# Patient Record
Sex: FEMALE | Race: WHITE | Employment: OTHER | URBAN - METROPOLITAN AREA
[De-identification: names, ages, dates, MRNs, and addresses within clinical notes are randomized per-mention and may not be internally consistent; named-entity substitution may affect disease eponyms.]

---

## 2017-01-25 ENCOUNTER — OP HISTORICAL/CONVERTED ENCOUNTER (OUTPATIENT)
Dept: OTHER | Age: 77
End: 2017-01-25

## 2017-05-27 ENCOUNTER — OP HISTORICAL/CONVERTED ENCOUNTER (OUTPATIENT)
Dept: OTHER | Age: 77
End: 2017-05-27

## 2017-06-18 ENCOUNTER — IP HISTORICAL/CONVERTED ENCOUNTER (OUTPATIENT)
Dept: OTHER | Age: 77
End: 2017-06-18

## 2017-08-09 ENCOUNTER — IP HISTORICAL/CONVERTED ENCOUNTER (OUTPATIENT)
Dept: OTHER | Age: 77
End: 2017-08-09

## 2017-10-04 ENCOUNTER — OP HISTORICAL/CONVERTED ENCOUNTER (OUTPATIENT)
Dept: OTHER | Age: 77
End: 2017-10-04

## 2017-11-28 ENCOUNTER — OP HISTORICAL/CONVERTED ENCOUNTER (OUTPATIENT)
Dept: OTHER | Age: 77
End: 2017-11-28

## 2018-03-31 ENCOUNTER — OP HISTORICAL/CONVERTED ENCOUNTER (OUTPATIENT)
Dept: OTHER | Age: 78
End: 2018-03-31

## 2018-08-15 ENCOUNTER — OP HISTORICAL/CONVERTED ENCOUNTER (OUTPATIENT)
Dept: OTHER | Age: 78
End: 2018-08-15

## 2018-09-15 ENCOUNTER — ED HISTORICAL/CONVERTED ENCOUNTER (OUTPATIENT)
Dept: OTHER | Age: 78
End: 2018-09-15

## 2018-12-04 ENCOUNTER — OP HISTORICAL/CONVERTED ENCOUNTER (OUTPATIENT)
Dept: OTHER | Age: 78
End: 2018-12-04

## 2019-01-22 ENCOUNTER — OP HISTORICAL/CONVERTED ENCOUNTER (OUTPATIENT)
Dept: OTHER | Age: 79
End: 2019-01-22

## 2019-02-27 ENCOUNTER — OP HISTORICAL/CONVERTED ENCOUNTER (OUTPATIENT)
Dept: OTHER | Age: 79
End: 2019-02-27

## 2019-07-13 ENCOUNTER — OP HISTORICAL/CONVERTED ENCOUNTER (OUTPATIENT)
Dept: OTHER | Age: 79
End: 2019-07-13

## 2019-12-04 ENCOUNTER — OP HISTORICAL/CONVERTED ENCOUNTER (OUTPATIENT)
Dept: OTHER | Age: 79
End: 2019-12-04

## 2020-10-25 ENCOUNTER — HOSPITAL ENCOUNTER (OUTPATIENT)
Dept: PREADMISSION TESTING | Age: 80
Discharge: HOME | End: 2020-10-25
Payer: MEDICARE

## 2020-10-25 PROCEDURE — 87635 SARS-COV-2 COVID-19 AMP PRB: CPT

## 2020-10-26 LAB — SARS-COV-2, COV2: NORMAL

## 2020-10-28 LAB — SARS-COV-2, COV2NT: NOT DETECTED

## 2020-10-29 ENCOUNTER — HOSPITAL ENCOUNTER (OUTPATIENT)
Age: 80
Discharge: HOME | End: 2020-10-29
Attending: SURGERY | Admitting: SURGERY
Payer: MEDICARE

## 2020-10-29 ENCOUNTER — ANESTHESIA (OUTPATIENT)
Dept: SURGERY | Age: 80
End: 2020-10-29
Payer: MEDICARE

## 2020-10-29 ENCOUNTER — ANESTHESIA EVENT (OUTPATIENT)
Dept: SURGERY | Age: 80
End: 2020-10-29
Payer: MEDICARE

## 2020-10-29 VITALS
TEMPERATURE: 97.1 F | DIASTOLIC BLOOD PRESSURE: 53 MMHG | RESPIRATION RATE: 16 BRPM | SYSTOLIC BLOOD PRESSURE: 111 MMHG | HEART RATE: 64 BPM | OXYGEN SATURATION: 98 %

## 2020-10-29 PROCEDURE — 77030041703 HC SLV COMPR DVT ARJO -B: Performed by: SURGERY

## 2020-10-29 PROCEDURE — 74011250636 HC RX REV CODE- 250/636: Performed by: NURSE ANESTHETIST, CERTIFIED REGISTERED

## 2020-10-29 PROCEDURE — 77030031139 HC SUT VCRL2 J&J -A: Performed by: SURGERY

## 2020-10-29 PROCEDURE — 74011000250 HC RX REV CODE- 250: Performed by: NURSE ANESTHETIST, CERTIFIED REGISTERED

## 2020-10-29 PROCEDURE — 76210000021 HC REC RM PH II 0.5 TO 1 HR: Performed by: SURGERY

## 2020-10-29 PROCEDURE — 76010000138 HC OR TIME 0.5 TO 1 HR: Performed by: SURGERY

## 2020-10-29 PROCEDURE — 74011250636 HC RX REV CODE- 250/636: Performed by: SURGERY

## 2020-10-29 PROCEDURE — 2709999900 HC NON-CHARGEABLE SUPPLY: Performed by: SURGERY

## 2020-10-29 PROCEDURE — 77030010509 HC AIRWY LMA MSK TELE -A: Performed by: NURSE ANESTHETIST, CERTIFIED REGISTERED

## 2020-10-29 PROCEDURE — 88300 SURGICAL PATH GROSS: CPT

## 2020-10-29 PROCEDURE — 74011000250 HC RX REV CODE- 250: Performed by: SURGERY

## 2020-10-29 PROCEDURE — 77030002933 HC SUT MCRYL J&J -A: Performed by: SURGERY

## 2020-10-29 PROCEDURE — 76060000032 HC ANESTHESIA 0.5 TO 1 HR: Performed by: SURGERY

## 2020-10-29 PROCEDURE — 76210000006 HC OR PH I REC 0.5 TO 1 HR: Performed by: SURGERY

## 2020-10-29 RX ORDER — OXYCODONE AND ACETAMINOPHEN 5; 325 MG/1; MG/1
2 TABLET ORAL AS NEEDED
Status: DISCONTINUED | OUTPATIENT
Start: 2020-10-29 | End: 2020-10-29 | Stop reason: HOSPADM

## 2020-10-29 RX ORDER — DEXAMETHASONE SODIUM PHOSPHATE 4 MG/ML
INJECTION, SOLUTION INTRA-ARTICULAR; INTRALESIONAL; INTRAMUSCULAR; INTRAVENOUS; SOFT TISSUE AS NEEDED
Status: DISCONTINUED | OUTPATIENT
Start: 2020-10-29 | End: 2020-10-29 | Stop reason: HOSPADM

## 2020-10-29 RX ORDER — ROSUVASTATIN CALCIUM 10 MG/1
10 TABLET, COATED ORAL
COMMUNITY

## 2020-10-29 RX ORDER — FENTANYL CITRATE 50 UG/ML
50 INJECTION, SOLUTION INTRAMUSCULAR; INTRAVENOUS
Status: DISCONTINUED | OUTPATIENT
Start: 2020-10-29 | End: 2020-10-29 | Stop reason: HOSPADM

## 2020-10-29 RX ORDER — DIPHENHYDRAMINE HYDROCHLORIDE 50 MG/ML
12.5 INJECTION, SOLUTION INTRAMUSCULAR; INTRAVENOUS AS NEEDED
Status: DISCONTINUED | OUTPATIENT
Start: 2020-10-29 | End: 2020-10-29 | Stop reason: HOSPADM

## 2020-10-29 RX ORDER — LIDOCAINE HYDROCHLORIDE 20 MG/ML
INJECTION, SOLUTION EPIDURAL; INFILTRATION; INTRACAUDAL; PERINEURAL AS NEEDED
Status: DISCONTINUED | OUTPATIENT
Start: 2020-10-29 | End: 2020-10-29 | Stop reason: HOSPADM

## 2020-10-29 RX ORDER — BUPIVACAINE HYDROCHLORIDE 2.5 MG/ML
INJECTION, SOLUTION EPIDURAL; INFILTRATION; INTRACAUDAL AS NEEDED
Status: DISCONTINUED | OUTPATIENT
Start: 2020-10-29 | End: 2020-10-29 | Stop reason: HOSPADM

## 2020-10-29 RX ORDER — SODIUM CHLORIDE 0.9 % (FLUSH) 0.9 %
5-40 SYRINGE (ML) INJECTION AS NEEDED
Status: DISCONTINUED | OUTPATIENT
Start: 2020-10-29 | End: 2020-10-29 | Stop reason: HOSPADM

## 2020-10-29 RX ORDER — CEFAZOLIN SODIUM 2 G/100ML
2 INJECTION, SOLUTION INTRAVENOUS ONCE
Status: COMPLETED | OUTPATIENT
Start: 2020-10-29 | End: 2020-10-29

## 2020-10-29 RX ORDER — SODIUM CHLORIDE, SODIUM LACTATE, POTASSIUM CHLORIDE, CALCIUM CHLORIDE 600; 310; 30; 20 MG/100ML; MG/100ML; MG/100ML; MG/100ML
20 INJECTION, SOLUTION INTRAVENOUS CONTINUOUS
Status: DISCONTINUED | OUTPATIENT
Start: 2020-10-29 | End: 2020-10-29 | Stop reason: HOSPADM

## 2020-10-29 RX ORDER — ONDANSETRON 2 MG/ML
4 INJECTION INTRAMUSCULAR; INTRAVENOUS AS NEEDED
Status: DISCONTINUED | OUTPATIENT
Start: 2020-10-29 | End: 2020-10-29 | Stop reason: HOSPADM

## 2020-10-29 RX ORDER — PROPOFOL 10 MG/ML
INJECTION, EMULSION INTRAVENOUS AS NEEDED
Status: DISCONTINUED | OUTPATIENT
Start: 2020-10-29 | End: 2020-10-29 | Stop reason: HOSPADM

## 2020-10-29 RX ORDER — ONDANSETRON 2 MG/ML
INJECTION INTRAMUSCULAR; INTRAVENOUS AS NEEDED
Status: DISCONTINUED | OUTPATIENT
Start: 2020-10-29 | End: 2020-10-29 | Stop reason: HOSPADM

## 2020-10-29 RX ORDER — HYDROMORPHONE HYDROCHLORIDE 1 MG/ML
0.5 INJECTION, SOLUTION INTRAMUSCULAR; INTRAVENOUS; SUBCUTANEOUS
Status: DISCONTINUED | OUTPATIENT
Start: 2020-10-29 | End: 2020-10-29 | Stop reason: HOSPADM

## 2020-10-29 RX ADMIN — CEFAZOLIN SODIUM 2 G: 2 INJECTION, SOLUTION INTRAVENOUS at 08:23

## 2020-10-29 RX ADMIN — LIDOCAINE HYDROCHLORIDE 80 MG: 20 INJECTION, SOLUTION EPIDURAL; INFILTRATION; INTRACAUDAL; PERINEURAL at 08:17

## 2020-10-29 RX ADMIN — SODIUM CHLORIDE, POTASSIUM CHLORIDE, SODIUM LACTATE AND CALCIUM CHLORIDE 20 ML/HR: 600; 310; 30; 20 INJECTION, SOLUTION INTRAVENOUS at 07:39

## 2020-10-29 RX ADMIN — DEXAMETHASONE SODIUM PHOSPHATE 4 MG: 4 INJECTION, SOLUTION INTRA-ARTICULAR; INTRALESIONAL; INTRAMUSCULAR; INTRAVENOUS; SOFT TISSUE at 08:17

## 2020-10-29 RX ADMIN — ONDANSETRON 4 MG: 2 INJECTION INTRAMUSCULAR; INTRAVENOUS at 08:17

## 2020-10-29 RX ADMIN — PROPOFOL 150 MG: 10 INJECTION, EMULSION INTRAVENOUS at 08:17

## 2020-10-29 NOTE — BRIEF OP NOTE
Brief Postoperative Note    Patient: Fred Payton  YOB: 1940  MRN: [id number]    Date of Procedure: 10/29/2020     Pre-Op Diagnosis: Malignant neoplasm of bronchus and lung ([address] [C34.90]    Post-Op Diagnosis: Same as preoperative diagnosis.       Procedure(s):  Port A Cath Removal    Surgeon(s):  Mario Montero MD    Surgical Assistant: Surg Asst-1: Muriel Carrillo    Anesthesia: General     Estimated Blood Loss (mL): Minimal    Complications: None    Specimens:   ID Type Source Tests Collected by Time Destination   1 : Foreign Body Preservative Port insertion site  Mario Montero MD 10/29/2020 9906 Pathology        Implants: * No implants in log *    Drains: * No LDAs found *    Findings: as above    Electronically Signed by David Mims MD on 10/29/2020 at 9:21 AM

## 2020-10-29 NOTE — ANESTHESIA PREPROCEDURE EVALUATION
Relevant Problems   No relevant active problems       Anesthetic History               Review of Systems / Medical History  Patient summary reviewed, nursing notes reviewed and pertinent labs reviewed    Pulmonary    COPD        Asthma        Neuro/Psych   Within defined limits           Cardiovascular  Within defined limits          Dysrhythmias : SVT and sinus tachycardia  Hyperlipidemia         GI/Hepatic/Renal     GERD           Endo/Other        Arthritis and cancer (H/O Breast and Lung CA)     Other Findings   Comments: Allergies  Contrast Agent (Iodine)  Adhesive Tape-silicones  Ht: -  Weight: -  BMI: -    Procedure  Port A Cath Removal  In Pre-Op, [facility] MAIN OR 01  Anes:   No responsible anesthesiologist  Provider:   Apurva Armendariz MD      Medical History  COPD  Asthma  Hyperlipidemia  GERD (gastroesophageal reflux disease)  Lung cancer, lower lobe (HCC)  Irregular heart beat  Arthritis  Lung cancer ([address]  Dyslipidemia  Arrhythmia  Other ill-defined conditions(799.89)  Cancer ([address]  Cancer ([address]  Cancer ([address]  Unspecified adverse effect of anesthesia           Physical Exam    Airway  Mallampati: II  TM Distance: 4 - 6 cm  Neck ROM: normal range of motion   Mouth opening: Normal     Cardiovascular    Rhythm: regular  Rate: normal         Dental  No notable dental hx       Pulmonary  Breath sounds clear to auscultation               Abdominal  GI exam deferred       Other Findings            Anesthetic Plan    ASA: 3  Anesthesia type: general, total IV anesthesia and MAC          Induction: Intravenous  Anesthetic plan and risks discussed with: Patient

## 2020-10-29 NOTE — ANESTHESIA POSTPROCEDURE EVALUATION
Procedure(s):  Port A Cath Removal.    general    Anesthesia Post Evaluation      Multimodal analgesia: multimodal analgesia used between 6 hours prior to anesthesia start to PACU discharge  Patient location during evaluation: PACU  Patient participation: complete - patient participated  Level of consciousness: awake  Pain score: 0  Pain management: adequate  Airway patency: patent  Anesthetic complications: no  Cardiovascular status: acceptable  Respiratory status: acceptable  Hydration status: acceptable  Post anesthesia nausea and vomiting:  controlled  Final Post Anesthesia Temperature Assessment:  Normothermia (36.0-37.5 degrees C)      INITIAL Post-op Vital signs:   Vitals Value Taken Time   /70 10/29/2020  9:15 AM   Temp 36.2 °C (97.1 °F) 10/29/2020  9:05 AM   Pulse 84 10/29/2020  9:15 AM   Resp 18 10/29/2020  9:15 AM   SpO2 98 % 10/29/2020  9:15 AM

## 2020-10-29 NOTE — DISCHARGE INSTRUCTIONS
Patient Education   Patient Education        Infection After Surgery: Care Instructions  Your Care Instructions  After surgery, an infection is always possible. It doesn't mean that the surgery didn't go well. Because an infection can be serious, your doctor has taken steps to manage it. Your doctor checked the infection and cleaned it if necessary. He or she may have made an opening in the area so that the pus can drain out. You may have gauze in the cut so that the area will stay open and keep draining. You may need antibiotics. You will need to follow up with your doctor to make sure the infection has gone away. Follow-up care is a key part of your treatment and safety. Be sure to make and go to all appointments, and call your doctor if you are having problems. It's also a good idea to know your test results and keep a list of the medicines you take. How can you care for yourself at home? · Make sure your surgeon knows that you saw a doctor about the infection. · If your doctor prescribed antibiotics, take them as directed. Do not stop taking them just because you feel better. You need to take the full course of antibiotics. · Ask your doctor if you can take an over-the-counter pain medicine, such as acetaminophen (Tylenol), ibuprofen (Advil, Motrin), or naproxen (Aleve). Be safe with medicines. Read and follow all instructions on the label. · Do not take two or more pain medicines at the same time unless the doctor told you to. Many pain medicines have acetaminophen, which is Tylenol. Too much acetaminophen (Tylenol) can be harmful. · Prop up the area on a pillow anytime you sit or lie down during the next 3 days. Try to keep it above the level of your heart. This will help reduce swelling. · Keep the skin clean and dry. · If you have a bandage, keep it clean and dry. · You may have a dressing over the cut (incision). A dressing helps the incision heal and protects it.  Your doctor will tell you how to take care of this. You can expect drainage from the wound. · If your doctor told you how to care for your incision, follow your doctor's instructions. If you did not get instructions, follow this general advice:  ? Wash around the incision with clean water 2 times a day. Don't use hydrogen peroxide or alcohol, which can slow healing. When should you call for help? Call your doctor now or seek immediate medical care if:    · You have signs that your infection is getting worse, such as:  ? Increased pain, swelling, warmth, or redness in the area. ? Red streaks leading from the area. ? Pus draining from the wound. ? A new or higher fever. Watch closely for changes in your health, and be sure to contact your doctor if you have any problems. Where can you learn more? Go to http://www.gray.com/  Enter C340 in the search box to learn more about \"Infection After Surgery: Care Instructions. \"  Current as of: June 26, 2019               Content Version: 12.6  © 3299-5926 [employer]. Care instructions adapted under license by [employer] (which disclaims liability or warranty for this information). If you have questions about a medical condition or this instruction, always ask your healthcare professional. [address] any warranty or liability for your use of this information. Learning About Anesthesia  What is anesthesia? Anesthesia controls pain. And it keeps all your organs working normally during surgery or another kind of procedure. Anesthesia can relax you. It can also make you sleepy or forgetful. Or it may make you unconscious. It depends on what kind you get. Your anesthesia provider (anesthesiologist or nurse anesthetist) will make sure you are comfortable and safe during the procedure or surgery. There are different types of anesthesia. · Local anesthesia. This type numbs a small part of the body.  Doctors use it for simple procedures. ? You get a shot in the area the doctor will work on.  ? You will feel some pressure during the procedure. ? You may stay awake. Or you may get medicine to help you relax or sleep. · Regional anesthesia. This type blocks pain to a larger area of the body. It can also help relieve pain right after surgery. And it may reduce your need for other pain medicine after surgery. There are different types. They include:  ? Peripheral nerve block. This is a shot near a specific nerve or group of nerves. It blocks pain in the part of the body supplied by the nerve. This is often used for procedures on the hands, arms, feet, legs, or face. ? Epidural and spinal anesthesia. This is a shot near the spinal cord and the nerves around it. It blocks pain from an entire area of the body, such as the belly, hips, or legs. · General anesthesia. This type affects the brain and the whole body. You may get it through a small tube placed in a vein (IV). Or you may breathe it in. You are unconscious and will not feel pain. During the surgery, you will be comfortable. Later, you will not remember much about the surgery. What type will you have? The type of anesthesia you have depends on many things, such as:  · The type of surgery or procedure and the reason you are having it. · Test results, such as blood tests. · How worried you feel about the surgery. · Your health. Your doctor and nurses will ask you about any past surgeries. They will ask about any health problems you may have, such as diabetes, lung or heart disease, or a history of stroke. They will want to know if you take medicine, such as blood thinners. Your doctor may also ask if any family members have had any problems with anesthesia. You will talk with your anesthesia provider about your options. In many cases, you may be able to choose the type of anesthesia you have. What are the risks of anesthesia? Major side effects are not common. But all types of anesthesia have some risk. Your risk depends on your overall health. It also depends on the type of anesthesia you have and how you respond to it. Serious but rare risks include breathing problems, heart attack, stroke, and reaction to the medicine. Some health conditions increase the risk of problems. Your anesthesia provider will find out about any health problems you have that may affect your care. Your anesthesia provider will closely watch your vital signs during anesthesia and surgery. This includes checking your blood pressure and heart rate. This may help you avoid problems from anesthesia. What can you do to prepare? You will get a list of instructions to help you prepare. Your doctor will let you know what to expect when you get to the hospital, during the surgery, and after. You will get instructions about when to stop eating and drinking. If you take medicine, you will get instructions about what you can and can't take before surgery. You will be asked to sign a consent form that says you understand the risks of anesthesia. Before you do, your anesthesia provider will talk with you about the best type for you and the risks and benefits of that type. Many people are nervous before they have anesthesia and surgery. Ask your doctor about ways to relax before surgery. These may include relaxation exercises or medicine. What can you expect after having anesthesia? Right after the surgery, you will be in the recovery room. Nurses will make sure you are comfortable. As the anesthesia wears off, you may feel some pain and discomfort from your surgery. Tell someone if you have pain. Pain medicine works better if you take it before the pain gets bad. You may feel some of the effects of anesthesia for a while. It takes time for the effects of the medicine to completely wear off. · If you had local or regional anesthesia you may feel numb and have less feeling in part of your body.  It may also take a few hours for you to be able to move and control your muscles as usual.  · When you first wake up from general anesthesia, you may be confused. Or it may be hard to think clearly. This is normal.  · Don't do anything for 24 hours that requires attention to detail. This includes going to work, making important decisions, or signing any legal documents. Other common side effects of anesthesia include:  · Nausea and vomiting. This does not usually last long. It can be treated with medicine. · A slight drop in body temperature. You may feel cold and shiver when you first wake up. · A sore throat, if you had general anesthesia. · Muscle aches or weakness. · Feeling tired. For minor surgeries, you may go home the same day. For other surgeries you may stay in the hospital. Your doctor will check on your recovery from the anesthesia. He or she will answer any questions you may have. Follow-up care is a key part of your treatment and safety. Be sure to make and go to all appointments, and call your doctor if you are having problems. It's also a good idea to know your test results and keep a list of the medicines you take. Where can you learn more? Go to http://www.gray.com/  Enter V817 in the search box to learn more about \"Learning About Anesthesia. \"  Current as of: August 22, 2019               Content Version: 12.6  © 3130-6326 [employer]. Care instructions adapted under license by [employer] (which disclaims liability or warranty for this information). If you have questions about a medical condition or this instruction, always ask your healthcare professional. [place] [address] any warranty or liability for your use of this information.

## 2020-10-29 NOTE — PROGRESS NOTES
DC instructions reviewed with pt and her daughter. Both verb understanding. Pt escorted out via wheelchair for dc home with daughter.

## 2020-10-30 NOTE — OP NOTES
[facility]  OPERATIVE REPORT    Name:  Chu Thompson  MR#:  [id number]  :  1940  ACCOUNT #:  [de-identified]  DATE OF SERVICE:  10/29/2020    PREOPERATIVE DIAGNOSIS:  Right lung cancer, status post chemoradiation treatment. POSTOPERATIVE DIAGNOSIS:  Right lung cancer, status post chemoradiation treatment. PROCEDURE PERFORMED:  Removal of right Port-A-Cath. SURGEON:  Raegan Osborne MD    ASSISTANT:  Ms. Sandoval Hemanth:  LMA general anesthesia/local anesthesia. ANESTHESIOLOGIST:  Dr. Marisel Carter. NURSE ANESTHETIST:  Ms. Deidra Pepe. COMPLICATIONS:  None. SPECIMENS REMOVED:  none    IMPLANTS:  As above    ESTIMATED BLOOD LOSS:  Minimal.    INDICATION:  The patient is an 18-year-old female who has been diagnosed with right lung cancer, underwent chemoradiation treatment. She has completed the treatment. She has been disease free for the past few years, wanted the Port-A-Cath to be removed. Risks, benefits, and complications were discussed and consent obtained. DESCRIPTION OF PROCEDURE:  The patient was taken to the operating room. The patient was laid supine. The patient received prophylactic dose of antibiotic. The patient had TEDs and SCDs applied on both lower extremities. After LMA intubation, the right chest wall site was prepped and draped in the usual sterile fashion. Time-out was completed. Local anesthesia was infiltrated. A small transverse incision was placed right on top of the site of the port. The incision was deepened to subcutaneous tissue. The catheter was opened up. The port was delivered and the catheter was also removed along with the port. The tract was suture ligated with 3-0 Vicryl stitch. The subcutaneous tissue and the capsule were closed using 3-0 Vicryl simple interrupted stitches. The skin was closed with 4-0 Monocryl continuous subcuticular stitch. Dermabond was applied. Additional local anesthesia was infiltrated.     The patient tolerated the procedure very well. There were no complications. Estimated blood loss was minimal.  The patient was extubated and transferred to the recovery room in stable condition. All counts were correct. MD SCARLETT [id number]  D:  10/29/2020 9:29  T:  10/29/2020 20:37  JOB #:  [id number]  CC:   Lin Pereira MD

## 2020-12-10 ENCOUNTER — TRANSCRIBE ORDER (OUTPATIENT)
Dept: REGISTRATION | Age: 80
End: 2020-12-10

## 2020-12-10 ENCOUNTER — HOSPITAL ENCOUNTER (OUTPATIENT)
Dept: GENERAL RADIOLOGY | Age: 80
Discharge: HOME | End: 2020-12-10
Payer: MEDICARE

## 2020-12-10 DIAGNOSIS — M47.16 LUMBAR SPONDYLOSIS WITH MYELOPATHY: Primary | ICD-10-CM

## 2020-12-10 DIAGNOSIS — M47.16 LUMBAR SPONDYLOSIS WITH MYELOPATHY: ICD-10-CM

## 2020-12-10 DIAGNOSIS — M65.20 CALCIFIC TENDINITIS: ICD-10-CM

## 2020-12-10 PROCEDURE — 72070 X-RAY EXAM THORAC SPINE 2VWS: CPT

## 2021-03-05 ENCOUNTER — TRANSCRIBE ORDER (OUTPATIENT)
Dept: SCHEDULING | Age: 81
End: 2021-03-05

## 2021-03-05 DIAGNOSIS — C34.32 PRIMARY MALIGNANT NEOPLASM OF BRONCHUS OF LEFT LOWER LOBE (HCC): Primary | ICD-10-CM

## 2021-03-13 ENCOUNTER — HOSPITAL ENCOUNTER (OUTPATIENT)
Dept: PET IMAGING | Age: 81
Discharge: HOME | End: 2021-03-13
Attending: INTERNAL MEDICINE
Payer: MEDICARE

## 2021-03-13 DIAGNOSIS — C34.32 PRIMARY MALIGNANT NEOPLASM OF BRONCHUS OF LEFT LOWER LOBE (HCC): ICD-10-CM

## 2021-03-13 PROCEDURE — A9552 F18 FDG: HCPCS

## 2021-11-13 ENCOUNTER — APPOINTMENT (OUTPATIENT)
Dept: GENERAL RADIOLOGY | Age: 81
DRG: 871 | End: 2021-11-13
Attending: EMERGENCY MEDICINE
Payer: MEDICARE

## 2021-11-13 ENCOUNTER — HOSPITAL ENCOUNTER (INPATIENT)
Age: 81
LOS: 3 days | Discharge: HOME | DRG: 871 | End: 2021-11-16
Attending: EMERGENCY MEDICINE | Admitting: INTERNAL MEDICINE
Payer: MEDICARE

## 2021-11-13 DIAGNOSIS — R06.02 SOB (SHORTNESS OF BREATH): Primary | ICD-10-CM

## 2021-11-13 PROBLEM — J96.21 ACUTE AND CHRONIC RESPIRATORY FAILURE WITH HYPOXIA (HCC): Status: ACTIVE | Noted: 2021-11-13

## 2021-11-13 PROBLEM — A41.9 SEPSIS (HCC): Status: ACTIVE | Noted: 2021-11-13

## 2021-11-13 LAB
ALBUMIN SERPL-MCNC: 3.3 G/DL (ref 3.5–5)
ALBUMIN/GLOB SERPL: 0.8 {RATIO} (ref 1.1–2.2)
ALP SERPL-CCNC: 88 U/L (ref 45–117)
ALT SERPL-CCNC: 16 U/L (ref 12–78)
ANION GAP SERPL CALC-SCNC: 6 MMOL/L (ref 5–15)
APPEARANCE UR: CLEAR
APTT PPP: 32.3 SEC (ref 21.2–34.1)
AST SERPL W P-5'-P-CCNC: 10 U/L (ref 15–37)
ATRIAL RATE: 114 BPM
BACTERIA URNS QL MICRO: NEGATIVE /HPF
BASOPHILS # BLD: 0 K/UL (ref 0–0.1)
BASOPHILS NFR BLD: 0 % (ref 0–1)
BILIRUB SERPL-MCNC: 0.6 MG/DL (ref 0.2–1)
BILIRUB UR QL: NEGATIVE
BNP SERPL-MCNC: 1126 PG/ML
BUN SERPL-MCNC: 11 MG/DL (ref 6–20)
BUN/CREAT SERPL: 17 (ref 12–20)
CA-I BLD-MCNC: 9.2 MG/DL (ref 8.5–10.1)
CALCULATED P AXIS, ECG09: 80 DEGREES
CALCULATED R AXIS, ECG10: 71 DEGREES
CALCULATED T AXIS, ECG11: 64 DEGREES
CHLORIDE SERPL-SCNC: 101 MMOL/L (ref 97–108)
CO2 SERPL-SCNC: 31 MMOL/L (ref 21–32)
COLOR UR: ABNORMAL
COVID-19 RAPID TEST, COVR: NOT DETECTED
CREAT SERPL-MCNC: 0.64 MG/DL (ref 0.55–1.02)
DIAGNOSIS, 93000: NORMAL
DIFFERENTIAL METHOD BLD: ABNORMAL
EOSINOPHIL # BLD: 0 K/UL (ref 0–0.4)
EOSINOPHIL NFR BLD: 0 % (ref 0–7)
ERYTHROCYTE [DISTWIDTH] IN BLOOD BY AUTOMATED COUNT: 13.4 % (ref 11.5–14.5)
FLUAV AG NPH QL IA: NEGATIVE
FLUBV AG NOSE QL IA: NEGATIVE
GLOBULIN SER CALC-MCNC: 3.9 G/DL (ref 2–4)
GLUCOSE SERPL-MCNC: 124 MG/DL (ref 65–100)
GLUCOSE UR STRIP.AUTO-MCNC: NEGATIVE MG/DL
HCT VFR BLD AUTO: 38.9 % (ref 35–47)
HGB BLD-MCNC: 11.9 G/DL (ref 11.5–16)
HGB UR QL STRIP: ABNORMAL
HYALINE CASTS URNS QL MICRO: ABNORMAL /LPF (ref 0–5)
IMM GRANULOCYTES # BLD AUTO: 0.1 K/UL (ref 0–0.04)
IMM GRANULOCYTES NFR BLD AUTO: 1 % (ref 0–0.5)
INR PPP: 1 (ref 0.9–1.1)
KETONES UR QL STRIP.AUTO: 5 MG/DL
LACTATE SERPL-SCNC: 1.1 MMOL/L (ref 0.4–2)
LEUKOCYTE ESTERASE UR QL STRIP.AUTO: NEGATIVE
LYMPHOCYTES # BLD: 1.1 K/UL (ref 0.8–3.5)
LYMPHOCYTES NFR BLD: 6 % (ref 12–49)
MCH RBC QN AUTO: 29.4 PG (ref 26–34)
MCHC RBC AUTO-ENTMCNC: 30.6 G/DL (ref 30–36.5)
MCV RBC AUTO: 96 FL (ref 80–99)
MONOCYTES # BLD: 1.2 K/UL (ref 0–1)
MONOCYTES NFR BLD: 7 % (ref 5–13)
MUCOUS THREADS URNS QL MICRO: ABNORMAL /LPF
NEUTS SEG # BLD: 14.9 K/UL (ref 1.8–8)
NEUTS SEG NFR BLD: 86 % (ref 32–75)
NITRITE UR QL STRIP.AUTO: NEGATIVE
NRBC # BLD: 0 K/UL (ref 0–0.01)
NRBC BLD-RTO: 0 PER 100 WBC
P-R INTERVAL, ECG05: 150 MS
PH UR STRIP: 5 [PH] (ref 5–8)
PLATELET # BLD AUTO: 247 K/UL (ref 150–400)
PMV BLD AUTO: 9.5 FL (ref 8.9–12.9)
POTASSIUM SERPL-SCNC: 3.7 MMOL/L (ref 3.5–5.1)
PROCALCITONIN SERPL-MCNC: 0.65 NG/ML
PROT SERPL-MCNC: 7.2 G/DL (ref 6.4–8.2)
PROT UR STRIP-MCNC: NEGATIVE MG/DL
PROTHROMBIN TIME: 12.9 SEC (ref 11.9–14.7)
Q-T INTERVAL, ECG07: 300 MS
QRS DURATION, ECG06: 76 MS
QTC CALCULATION (BEZET), ECG08: 413 MS
RBC # BLD AUTO: 4.05 M/UL (ref 3.8–5.2)
RBC #/AREA URNS HPF: ABNORMAL /HPF (ref 0–5)
SODIUM SERPL-SCNC: 138 MMOL/L (ref 136–145)
SP GR UR REFRACTOMETRY: 1.01 (ref 1–1.03)
SPECIMEN SOURCE: NORMAL
THERAPEUTIC RANGE,PTTT: NORMAL SEC (ref 82–109)
TROPONIN-HIGH SENSITIVITY: 53 NG/L (ref 0–51)
TROPONIN-HIGH SENSITIVITY: 56 NG/L (ref 0–51)
UA: UC IF INDICATED,UAUC: ABNORMAL
UROBILINOGEN UR QL STRIP.AUTO: 0.1 EU/DL (ref 0.1–1)
VENTRICULAR RATE, ECG03: 114 BPM
WBC # BLD AUTO: 17.3 K/UL (ref 3.6–11)
WBC URNS QL MICRO: ABNORMAL /HPF (ref 0–4)

## 2021-11-13 PROCEDURE — 85610 PROTHROMBIN TIME: CPT

## 2021-11-13 PROCEDURE — 81001 URINALYSIS AUTO W/SCOPE: CPT

## 2021-11-13 PROCEDURE — 80053 COMPREHEN METABOLIC PANEL: CPT

## 2021-11-13 PROCEDURE — 83605 ASSAY OF LACTIC ACID: CPT

## 2021-11-13 PROCEDURE — 83880 ASSAY OF NATRIURETIC PEPTIDE: CPT

## 2021-11-13 PROCEDURE — 71045 X-RAY EXAM CHEST 1 VIEW: CPT

## 2021-11-13 PROCEDURE — 84145 PROCALCITONIN (PCT): CPT

## 2021-11-13 PROCEDURE — 74011250636 HC RX REV CODE- 250/636: Performed by: EMERGENCY MEDICINE

## 2021-11-13 PROCEDURE — 74011250636 HC RX REV CODE- 250/636: Performed by: NURSE PRACTITIONER

## 2021-11-13 PROCEDURE — 87804 INFLUENZA ASSAY W/OPTIC: CPT

## 2021-11-13 PROCEDURE — 85730 THROMBOPLASTIN TIME PARTIAL: CPT

## 2021-11-13 PROCEDURE — 99285 EMERGENCY DEPT VISIT HI MDM: CPT

## 2021-11-13 PROCEDURE — 74011250637 HC RX REV CODE- 250/637: Performed by: NURSE PRACTITIONER

## 2021-11-13 PROCEDURE — 74011250637 HC RX REV CODE- 250/637: Performed by: EMERGENCY MEDICINE

## 2021-11-13 PROCEDURE — 93005 ELECTROCARDIOGRAM TRACING: CPT

## 2021-11-13 PROCEDURE — 94640 AIRWAY INHALATION TREATMENT: CPT

## 2021-11-13 PROCEDURE — 85025 COMPLETE CBC W/AUTO DIFF WBC: CPT

## 2021-11-13 PROCEDURE — 36415 COLL VENOUS BLD VENIPUNCTURE: CPT

## 2021-11-13 PROCEDURE — 87635 SARS-COV-2 COVID-19 AMP PRB: CPT

## 2021-11-13 PROCEDURE — 84484 ASSAY OF TROPONIN QUANT: CPT

## 2021-11-13 PROCEDURE — 65270000029 HC RM PRIVATE

## 2021-11-13 PROCEDURE — 87040 BLOOD CULTURE FOR BACTERIA: CPT

## 2021-11-13 RX ORDER — PANTOPRAZOLE SODIUM 40 MG/1
40 TABLET, DELAYED RELEASE ORAL DAILY
Status: DISCONTINUED | OUTPATIENT
Start: 2021-11-14 | End: 2021-11-16 | Stop reason: HOSPADM

## 2021-11-13 RX ORDER — HYDROXYZINE PAMOATE 25 MG/1
25 CAPSULE ORAL
Status: DISCONTINUED | OUTPATIENT
Start: 2021-11-13 | End: 2021-11-16 | Stop reason: HOSPADM

## 2021-11-13 RX ORDER — METOPROLOL SUCCINATE 50 MG/1
50 TABLET, EXTENDED RELEASE ORAL DAILY
Status: DISCONTINUED | OUTPATIENT
Start: 2021-11-14 | End: 2021-11-16 | Stop reason: HOSPADM

## 2021-11-13 RX ORDER — ACETAMINOPHEN 500 MG
1000 TABLET ORAL
Status: COMPLETED | OUTPATIENT
Start: 2021-11-13 | End: 2021-11-13

## 2021-11-13 RX ORDER — DOXYCYCLINE 100 MG/1
100 CAPSULE ORAL EVERY 12 HOURS
Status: DISCONTINUED | OUTPATIENT
Start: 2021-11-13 | End: 2021-11-15

## 2021-11-13 RX ORDER — ALBUTEROL SULFATE 90 UG/1
2 AEROSOL, METERED RESPIRATORY (INHALATION)
Status: DISCONTINUED | OUTPATIENT
Start: 2021-11-13 | End: 2021-11-16 | Stop reason: HOSPADM

## 2021-11-13 RX ORDER — FOLIC ACID 1 MG/1
1 TABLET ORAL DAILY
Status: DISCONTINUED | OUTPATIENT
Start: 2021-11-14 | End: 2021-11-16 | Stop reason: HOSPADM

## 2021-11-13 RX ORDER — ACETAMINOPHEN 500 MG
500 TABLET ORAL
Status: DISCONTINUED | OUTPATIENT
Start: 2021-11-13 | End: 2021-11-16 | Stop reason: HOSPADM

## 2021-11-13 RX ORDER — ASPIRIN 325 MG
325 TABLET ORAL
Status: COMPLETED | OUTPATIENT
Start: 2021-11-13 | End: 2021-11-13

## 2021-11-13 RX ORDER — GUAIFENESIN DEXTROMETHORPHAN HYDROBROMIDE ORAL SOLUTION 10; 100 MG/5ML; MG/5ML
10 SOLUTION ORAL
Status: DISCONTINUED | OUTPATIENT
Start: 2021-11-13 | End: 2021-11-16 | Stop reason: HOSPADM

## 2021-11-13 RX ORDER — LANOLIN ALCOHOL/MO/W.PET/CERES
3 CREAM (GRAM) TOPICAL
Status: DISCONTINUED | OUTPATIENT
Start: 2021-11-13 | End: 2021-11-16 | Stop reason: HOSPADM

## 2021-11-13 RX ORDER — DIPHENHYDRAMINE HCL 25 MG
25 TABLET ORAL
Status: DISCONTINUED | OUTPATIENT
Start: 2021-11-13 | End: 2021-11-13

## 2021-11-13 RX ORDER — NYSTATIN 100000 [USP'U]/ML
500000 SUSPENSION ORAL 4 TIMES DAILY
Status: DISCONTINUED | OUTPATIENT
Start: 2021-11-13 | End: 2021-11-16 | Stop reason: HOSPADM

## 2021-11-13 RX ORDER — BUDESONIDE AND FORMOTEROL FUMARATE DIHYDRATE 160; 4.5 UG/1; UG/1
2 AEROSOL RESPIRATORY (INHALATION) 2 TIMES DAILY
Status: DISCONTINUED | OUTPATIENT
Start: 2021-11-13 | End: 2021-11-16 | Stop reason: HOSPADM

## 2021-11-13 RX ORDER — IPRATROPIUM BROMIDE AND ALBUTEROL SULFATE 2.5; .5 MG/3ML; MG/3ML
3 SOLUTION RESPIRATORY (INHALATION)
Status: DISCONTINUED | OUTPATIENT
Start: 2021-11-13 | End: 2021-11-14

## 2021-11-13 RX ORDER — ENOXAPARIN SODIUM 100 MG/ML
40 INJECTION SUBCUTANEOUS EVERY 24 HOURS
Status: DISCONTINUED | OUTPATIENT
Start: 2021-11-13 | End: 2021-11-16 | Stop reason: HOSPADM

## 2021-11-13 RX ORDER — ATORVASTATIN CALCIUM 20 MG/1
20 TABLET, FILM COATED ORAL
Status: DISCONTINUED | OUTPATIENT
Start: 2021-11-13 | End: 2021-11-16 | Stop reason: HOSPADM

## 2021-11-13 RX ORDER — GUAIFENESIN 600 MG/1
1200 TABLET, EXTENDED RELEASE ORAL EVERY 12 HOURS
Status: DISCONTINUED | OUTPATIENT
Start: 2021-11-13 | End: 2021-11-16 | Stop reason: HOSPADM

## 2021-11-13 RX ORDER — FAMOTIDINE 20 MG/1
20 TABLET, FILM COATED ORAL DAILY
Status: DISCONTINUED | OUTPATIENT
Start: 2021-11-14 | End: 2021-11-13

## 2021-11-13 RX ORDER — NYSTATIN 100000 [USP'U]/ML
500000 SUSPENSION ORAL 4 TIMES DAILY
Status: DISCONTINUED | OUTPATIENT
Start: 2021-11-13 | End: 2021-11-13

## 2021-11-13 RX ORDER — ONDANSETRON 2 MG/ML
4 INJECTION INTRAMUSCULAR; INTRAVENOUS
Status: DISCONTINUED | OUTPATIENT
Start: 2021-11-13 | End: 2021-11-16 | Stop reason: HOSPADM

## 2021-11-13 RX ADMIN — SODIUM CHLORIDE 1000 ML: 9 INJECTION, SOLUTION INTRAVENOUS at 10:05

## 2021-11-13 RX ADMIN — ASPIRIN 325 MG ORAL TABLET 325 MG: 325 PILL ORAL at 11:02

## 2021-11-13 RX ADMIN — ENOXAPARIN SODIUM 40 MG: 40 INJECTION SUBCUTANEOUS at 14:04

## 2021-11-13 RX ADMIN — ATORVASTATIN CALCIUM 20 MG: 20 TABLET, FILM COATED ORAL at 21:04

## 2021-11-13 RX ADMIN — ACETAMINOPHEN 1000 MG: 500 TABLET ORAL at 11:02

## 2021-11-13 RX ADMIN — ACETAMINOPHEN 500 MG: 500 TABLET ORAL at 23:32

## 2021-11-13 RX ADMIN — GUAIFENESIN DEXTROMETHORPHAN HYDROBROMIDE ORAL SOLUTION 10 ML: 10; 100 SOLUTION ORAL at 16:04

## 2021-11-13 RX ADMIN — DOXYCYCLINE HYCLATE 100 MG: 100 CAPSULE ORAL at 14:04

## 2021-11-13 RX ADMIN — ONDANSETRON 4 MG: 2 INJECTION INTRAMUSCULAR; INTRAVENOUS at 17:37

## 2021-11-13 RX ADMIN — DOXYCYCLINE HYCLATE 100 MG: 100 CAPSULE ORAL at 21:04

## 2021-11-13 RX ADMIN — BUDESONIDE AND FORMOTEROL FUMARATE DIHYDRATE 2 PUFF: 160; 4.5 AEROSOL RESPIRATORY (INHALATION) at 19:50

## 2021-11-13 RX ADMIN — GUAIFENESIN 1200 MG: 600 TABLET ORAL at 21:04

## 2021-11-13 NOTE — CONSULTS
Consult    Patient: Sonia Juárez MRN: [id number]  SSN: [id number]    YOB: 1940  Age: 78 y.o. Sex: female      Subjective:      Sonia Juárez is a 78 y.o. female who is being seen for abnormal troponin. Patient with history of tachycardia on metoprolol, right upper lung cancer, asthma/COPD on chronic home oxygen, hyperlipidemia, GERD. She quit smoking 20 years ago. She was recently seen by primary care physician when she was doing just fine then starting Thursday she started having worsening shortness of breath and wheezing and some back pain and chest tightness. Denied any nausea, vomiting or fever or chills. The cough is dry. She denied recent change in her weight or lower extremity swelling. She has varicose veins. She denied recent falls.     Past Medical History:   Diagnosis Date    Arrhythmia     \"fast heart rate\" at times, takes Toprol to control    Arthritis     Asthma     Cancer ([address] 1996    RUL lung    Cancer ([address] 2001    left breast    Cancer ([address] 2013, 2014    right lung    COPD     Dyslipidemia 4/29/2014    GERD (gastroesophageal reflux disease)     Hyperlipidemia     Irregular heart beat 1st-1983,early 2000's    SVT per patient-none since starting metoprolol early 2000's    Lung cancer ([address] 4/29/2014    Lung cancer, lower lobe ([address] 3/2013    T3Nx adenocarcinoma RLL    Other ill-defined conditions(799.89)     urinary urgency    Unspecified adverse effect of anesthesia     severe itching after lung surgery     Past Surgical History:   Procedure Laterality Date    HX APPENDECTOMY      HX BLADDER SUSPENSION  around 2004    HX BREAST LUMPECTOMY  2001    left w/nodes removed    HX CATARACT REMOVAL Bilateral 2013    HX HYSTERECTOMY  1985    ANTON    HX ORTHOPAEDIC Right 1980's    bunion removal    HX OTHER SURGICAL  3/5/2013    RLL superior segmentectomy    HX OTHER SURGICAL  3/11/2013    Resection of RLL margins    HX UROLOGICAL      Bladder tuck [facility], 2014    right lung (see \"other\")    NV THORACOSCOPY SURG LOBECTOMY      RUL lung cancer      Family History   Problem Relation Age of Onset    Cancer Mother         colon    Cancer Father         prostate    Cancer Sister         breast    Cancer Other         breast    Ovarian Cancer Other     No Known Problems Brother     No Known Problems Maternal Grandmother     No Known Problems Maternal Grandfather     No Known Problems Paternal Grandmother     No Known Problems Paternal Grandfather      Social History     Tobacco Use    Smoking status: Former Smoker     Packs/day: 1.00     Years: 60.00     Pack years: 60.00     Types: Cigarettes     Quit date: 3/4/2013     Years since quittin.7    Smokeless tobacco: Never Used   Substance Use Topics    Alcohol use: No      Current Facility-Administered Medications   Medication Dose Route Frequency Provider Last Rate Last Admin    acetaminophen (TYLENOL) tablet 500 mg  500 mg Oral Q6H PRN Chay Gonzalez NP        albuterol (PROVENTIL HFA, VENTOLIN HFA, PROAIR HFA) inhaler 2 Puff  2 Puff Inhalation Q6H PRN Chay Gonzalez NP        [START ON 2021] pantoprazole (PROTONIX) tablet 40 mg  40 mg Oral DAILY Chay Gonzalez NP        budesonide-formoteroL (SYMBICORT) 160-4.5 mcg/actuation HFA inhaler 2 Puff  2 Puff Inhalation BID Chay Gonzalez NP        [START ON ] folic acid (FOLVITE) tablet 1 mg  1 mg Oral DAILY Chay Gonzalez NP        [START ON 2021] metoprolol succinate (TOPROL-XL) XL tablet 50 mg  50 mg Oral DAILY Chay Gonzalez NP        atorvastatin (LIPITOR) tablet 20 mg  20 mg Oral QHS Chay Gonzalez NP        [facility]dansetron [facility]) injection 4 mg  4 mg IntraVENous Q6H PRN Chay Gonzalez NP        hydrOXYzine pamoate (VISTARIL) capsule 25 mg  25 mg Oral TID PRN Chay Gonzalez NP        melatonin tablet 3 mg  3 mg Oral QHS PRN Suman Gonzalez, ABEL Arguello enoxaparin (LOVENOX) injection 40 mg  40 mg SubCUTAneous Q24H Zakeshav Jan A, NP   40 mg at 11/13/21 1404    albuterol-ipratropium (DUO-NEB) 2.5 MG-0.5 MG/3 ML  3 mL Nebulization Q4H PRN Lisa Chay MEGAN, NP        doxycycline (VIBRAMYCIN) capsule 100 mg  100 mg Oral Q12H Zarefoss Jan A, NP   100 mg at 11/13/21 1404    guaiFENesin ER (MUCINEX) tablet 1,200 mg  1,200 mg Oral Q12H [facility] A, NP        guaiFENesin-dextromethorphan (TUSSI-ORGANIDIN DM)  mg/5 mL oral solution 10 mL  10 mL Oral Q4H PRN [facility] A, NP   10 mL at 11/13/21 1604    prochlorperazine (COMPAZINE) with saline injection 10 mg  10 mg IntraVENous Q6H PRN Sigrid Hilario MD        nystatin (MYCOSTATIN) 100,000 unit/mL oral suspension 500,000 Units  500,000 Units Oral QID Zakeshav Jan A, NP            Allergies   Allergen Reactions    Contrast Agent [Iodine] Rash    Adhesive Tape-Silicones Itching and Contact Dermatitis       Review of Systems:  A comprehensive review of systems was negative except for that written in the History of Present Illness. Objective:     Vitals:    11/13/21 1102 11/13/21 1200 11/13/21 1304 11/13/21 1553   BP: (!) 138/95 (!) 114/58 (!) 108/55    Pulse: (!) 111 (!) 106 94 98   Resp: 20 20 20    Temp:  99.4 °F (37.4 °C) 99.1 °F (37.3 °C)    SpO2: 100% 98% 96%    Weight:       Height:            Physical Exam:  General:  Alert, cooperative, no distress, appears stated age. Eyes:  Conjunctivae/corneas clear. PERRL, EOMs intact. Fundi benign   Ears:  Normal TMs and external ear canals both ears. Nose: Nares normal. Septum midline. Mucosa normal. No drainage or sinus tenderness. Mouth/Throat: Lips, mucosa, and tongue normal. Teeth and gums normal.   Neck: Supple, symmetrical, trachea midline, no adenopathy, thyroid: no enlargment/tenderness/nodules, no carotid bruit and no JVD. Back:   Symmetric, no curvature. ROM normal. No CVA tenderness. Lungs:    End-expiratory wheeze bilaterally, diffuse. Heart:  Regular rate and rhythm, S1, S2 normal, no murmur, click, rub or gallop. Abdomen:   Soft, non-tender. Bowel sounds normal. No masses,  No organomegaly. Extremities: Extremities normal, atraumatic, no cyanosis or edema. Pulses: 2+ and symmetric all extremities. Skin: Skin color, texture, turgor normal. No rashes or lesions   Lymph nodes: Cervical, supraclavicular, and axillary nodes normal.   Neurologic: CNII-XII intact. Normal strength, sensation and reflexes throughout. Assessment:     Hospital Problems  Date Reviewed: 11/13/2021          Codes Class Noted POA    Sepsis ([facility] [address] ICD-10-CM: A41.9  ICD-9-CM: 038.9, 995.91  11/13/2021 Unknown        Acute and chronic respiratory failure with hypoxia [facility]) ICD-10-CM: B84.61  ICD-9-CM: 518.84, 799.02  11/13/2021 Unknown          Patient is an 22-year-old white female with:  1. Acute on chronic hypoxemic respiratory failure  2. Hypertension  3. SVT  4. Mild rise of troponin  5. GERD  6. Ex-smoker  7. History of lung adenocarcinoma status post right thoracotomy. Plan:     She denies any current chest pains to be euvolemic. White count is elevated at 17.3. Hemoglobin 11.9 and platelet 350. Potassium 3.7, at 0.6 and BUN of 11. Troponin were mildly elevated. She denied any current chest pain. Procalcitonin is 0.65. BNP is elevated but she appears to be euvolemic. Covid test was negative and influenza was negative. She received her Covid vaccination. EKG shows sinus tachycardia, septal infarct age undetermined. We will check another set of troponin. Check an echocardiogram.  Currently on atorvastatin and metoprolol. Further recommendation depending on progression, echocardiogram    Thank you  For involving me in Mrs. Bustos's care.     Signed By: Azalea Evangelitsa MD     November 13, 2021

## 2021-11-13 NOTE — PROGRESS NOTES
Advance Care Planning     Advance Care Planning (ACP) Physician/NP/PA Conversation      Date of Conversation: 11/13/2021  Conducted with: Patient with Decision Making Capacity    Healthcare Decision Maker:   No healthcare decision makers have been documented. Click here to complete [employer] including selection of the Healthcare Decision Maker Relationship (ie \"Primary\")    Today we documented Decision Maker(s) consistent with Legal Next of Kin hierarchy. Care Preferences:    Hospitalization: \"If your health worsens and it becomes clear that your chance of recovery is unlikely, what would be your preference regarding hospitalization? \"  The patient would prefer hospitalization. Ventilation: \"If you were unable to breathe on your own and your chance of recovery was unlikely, what would be your preference about the use of a ventilator (breathing machine) if it was available to you? \"   The patient would desire the use of a ventilator. Resuscitation: \"In the event your heart stopped as a result of an underlying serious health condition, would you want attempts to be made to restart your heart, or would you prefer a natural death? \"   Yes, attempt to resuscitate.     Additional topics discussed: treatment goals    Conversation Outcomes / Follow-Up Plan:   ACP complete - no further action today  Reviewed DNR/DNI and patient elects Full Code (Attempt Resuscitation)     Length of Voluntary ACP Conversation in minutes:  <16 minutes (Non-Billable)    Carlos Paulson NP

## 2021-11-13 NOTE — ED PROVIDER NOTES
EMERGENCY DEPARTMENT HISTORY AND PHYSICAL EXAM      Date: 11/13/2021  Patient Name: Dayo Martinez      History of Presenting Illness     Chief Complaint   Patient presents with    Chest Pain       History Provided By: Patient    HPI: Dayo Martinez, 78 y.o. female with a past medical history significant for COPD on home 2LNC, HLD, Remote Lung CA s/p resection presents to the ED with cc of CP, SOB, vomiting. Pt endorses 4-5d of having cold-like sx, general malaise, rhinorrhea. Assoc. W/substernal CP and SOB. Further endorsing N/V that began yesterday. Denies known sick contacts. Received COVID vaccine 2mo ago. Has not yet received flu vaccine. Denies known sick contacts. There are no other complaints, changes, or physical findings at this time. PCP: Graham Wilkins MD    Current Facility-Administered Medications   Medication Dose Route Frequency Provider Last Rate Last Admin    sodium chloride 0.9 % bolus infusion 1,000 mL  1,000 mL IntraVENous ONCE Sherrie Norris MD 1,000 mL/hr at 11/13/21 1005 1,000 mL at 11/13/21 1005     Current Outpatient Medications   Medication Sig Dispense Refill    rosuvastatin (Crestor) 10 mg tablet Take 10 mg by mouth nightly. iron polysaccharides (FERREX 150) 150 mg iron capsule Take 150 mg by mouth Daily (before breakfast). folic acid (FOLVITE) 1 mg tablet Take  by mouth Daily (before breakfast). oxyCODONE-acetaminophen (PERCOCET 10)  mg per tablet Take 1 tablet by mouth.      metoprolol succinate (TOPROL-XL) 50 mg XL tablet Take 1 Tab by mouth daily. 60 Tab 1    diphenhydrAMINE (BENADRYL) 25 mg tablet Take 25 mg by mouth every six (6) hours as needed (taken during allergy season). acetaminophen (TYLENOL) 500 mg tablet Take 500 mg by mouth every six (6) hours as needed. fluticasone-salmeterol (ADVAIR DISKUS) 250-50 mcg/dose diskus inhaler Take 1 Puff by inhalation every twelve (12) hours.       albuterol (VENTOLIN HFA) 90 mcg/Actuation inhaler Take 2 Puffs by inhalation every six (6) hours as needed. simvastatin (ZOCOR) 20 mg tablet Take 20 mg by mouth nightly. esomeprazole (NEXIUM) 40 mg capsule Take 40 mg by mouth Daily (before breakfast).          Past History     Past Medical History:  Past Medical History:   Diagnosis Date    Arrhythmia     \"fast heart rate\" at times, takes Toprol to control    Arthritis     Asthma     Cancer ([facility] [address]     RUL lung    Cancer ([facility] [address]     left breast    Cancer ([facility] [address] 2014    right lung    COPD     Dyslipidemia 2014    GERD (gastroesophageal reflux disease)     Hyperlipidemia     Irregular heart beat -,early     SVT per patient-none since starting metoprolol early     Lung cancer ([facility] [address] 2014    Lung cancer, lower lobe ([facility] [address] 3/2013    T3Nx adenocarcinoma RLL    Other ill-defined conditions(799.89)     urinary urgency    Unspecified adverse effect of anesthesia     severe itching after lung surgery       Past Surgical History:  Past Surgical History:   Procedure Laterality Date    [address], 2014    right lung (see \"other\")    HX APPENDECTOMY      HX BLADDER SUSPENSION  around     HX BREAST LUMPECTOMY      left w/nodes removed    HX CATARACT REMOVAL Bilateral     HX HYSTERECTOMY  1985    [facility]    HX ORTHOPAEDIC Right     bunion removal    HX OTHER SURGICAL  3/5/2013    RLL superior segmentectomy    HX OTHER SURGICAL  3/11/2013    Resection of RLL margins    HX UROLOGICAL      Bladder tuck    [place] THORACOSCOPY SURG LOBECTOMY  1996    RUL lung cancer       Family History:  Family History   Problem Relation Age of Onset    Cancer Mother         colon    Cancer Father         prostate    Cancer Sister         breast    Cancer Other         breast       Social History:  Social History     Tobacco Use    Smoking status: Former Smoker     Years: 60.00     Quit date: 3/4/2013     Years since quittin.7    Smokeless tobacco: Never Used Substance Use Topics    Alcohol use: No    Drug use: No       Allergies: Allergies   Allergen Reactions    Contrast Agent [Iodine] Rash    Adhesive Tape-Silicones Itching and Contact Dermatitis         Review of Systems   Constitutional: Negative except as in HPI. Eyes: Negative except as in HPI.  ENT: Negative except as in HPI. Cardiovascular: Negative except as in HPI. Respiratory: Negative except as in HPI. Gastrointestinal: Negative except as in HPI. Genitourinary: Negative except as in HPI. Musculoskeletal: Negative except as in HPI. Integumentary: Negative except as in HPI. Neurological: Negative except as in HPI. Psychiatric: Negative except as in HPI. Endocrine: Negative except as in HPI. Hematologic/Lymphatic: Negative except as in HPI. Allergic/Immunologic: Negative except as in HPI. Physical Exam   Constitutional: Awake and alert, interactive, NAD  Eyes: PERRL, no injection or scleral icterus, no discharge  HEENT: NCAT, neck supple, MMM, no oropharyngeal exudates  CV: RRR, no m/r/g  Respiratory: CTAB, no r/r/w on home 2LNC  GI: Abd soft, nondistended, nontender  : Deferred  MSK: FROM, no joint effusions or edema  Skin: No rashes  Neuro: CN2-12 intact, symmetric facies, fluent speech. Psych: Well-groomed, normal speech, behavior, appropriate mood    Lab and Diagnostic Study Results     Labs -   No results found for this or any previous visit (from the past 12 hour(s)). Radiologic Studies -   [unfilled]  CT Results  (Last 48 hours)      None          CXR Results  (Last 48 hours)                 11/13/21 1014  XR CHEST SNGL V Final result    Impression:  Postsurgical findings as above. Narrative:  Chest one view. No comparison. Portable AP upright view at 1008 dated 11/13/2021. There has been a remote right thoracotomy. There is right hemithorax volume loss   and right apical pleural thickening/loculated pleural fluid. The heart is not enlarged.  There is calcified plaque in the aorta. The left lung   is clear. There is no pneumothorax. There is a 6 mm linear radiodense structure overlying the left chest wall soft   tissues of uncertain significance. Medical Decision Making and ED Course   - I am the first and primary provider for this patient AND AM THE PRIMARY PROVIDER OF RECORD. - I reviewed the vital signs, available nursing notes, past medical history, past surgical history, family history and social history. - Initial assessment performed. The patients presenting problems have been discussed, and the staff are in agreement with the care plan formulated and outlined with them. I have encouraged them to ask questions as they arise throughout their visit. Vital Signs-Reviewed the patient's vital signs. Patient Vitals for the past 12 hrs:   Temp Pulse Resp BP SpO2   11/13/21 1004 -- -- -- -- 100 %   11/13/21 1002 -- (!) 115 20 135/63 100 %   11/13/21 0959 (!) 101.1 °F (38.4 °C) (!) 117 20 -- 98 %       EKG interpretation: [email or web address], nl QRS/QTc/axis, no DEE/STD, TWI inferior lead aVF    BSUS: B/l scattered B-lines, no pericardial effusion, confluent B-lines or PTX. Provider Notes (Medical Decision Making): 81F w/CP, SOB. EKG nonischemic will r/o ACS. Suspect Viral PNA, possibly superimposed bacterial, will get CXR to eval further. No hx of CHF to suggest fluid overload. Back on home [facility], Dispo pending workup. ED Course:       ED Course as of 11/13/21 1537   Sat Nov 13, 2021   1120 Admitted to Dr. Carla Cornelius for observation for Viral PNA. [YA]      ED Course User Index  [YA] Сергей Box MD         Consultations:     Hospitalist Dr. Carla Cornelius      Disposition     Disposition: Admitted to Floor Medical Floor the case was discussed with the admitting physician Dr. Carla Cornelius    Admitted    Diagnosis     Clinical Impression:   1. SOB (shortness of breath)        Attestations:     Narciso Kelly MD

## 2021-11-13 NOTE — ROUTINE PROCESS
TRANSFER - OUT REPORT:    Verbal report given to [facility] on Gely Otero  being transferred to [facility] nurse for routine progression of care       Report consisted of patients Situation, Background, Assessment and   Recommendations(SBAR). Information from the following report(s) SBAR was reviewed with the receiving nurse. Lines:   Peripheral IV 11/13/21 Right Antecubital (Active)       Peripheral IV 11/13/21 Anterior; Left Wrist (Active)        Opportunity for questions and clarification was provided.       Patient transported with:   [employer]

## 2021-11-13 NOTE — H&P
History and Physical    Patient: Tamela Christie MRN: [id number]  SSN: [id number]    YOB: 1940  Age: 78 y.o. Sex: female      Subjective:      Tamela Christie is a 78 y.o. female who comes to the hospital with one day complaints of dyspnea, associated cough and reported had nausea/vomiting and diarrhea yesterday at home. She has history of right lung lower lobe adenocarcinoma,s/p right thoracotomy, and is on continuous oxygen at home, however presented with hypoxia requiring NC O2 at 4 L . Presenting CXR showing left lung clear , no pneumothorax, and right apical pleural thickening/loculated pleural fluid. PMH includes hypertension, COPD, CAD, asthma, GERD, and arthritis. She will be admitted for further evaluation and treatment.      Past Medical History:   Diagnosis Date    Arrhythmia     \"fast heart rate\" at times, takes Toprol to control    Arthritis     Asthma     Cancer ([address] 1996    RUL lung    Cancer ([address] 2001    left breast    Cancer ([address] 2013, 2014    right lung    COPD     Dyslipidemia 4/29/2014    GERD (gastroesophageal reflux disease)     Hyperlipidemia     Irregular heart beat 1st-1983,early 2000's    SVT per patient-none since starting metoprolol early 2000's    Lung cancer ([address] 4/29/2014    Lung cancer, lower lobe ([address] 3/2013    T3Nx adenocarcinoma RLL    Other ill-defined conditions(799.89)     urinary urgency    Unspecified adverse effect of anesthesia     severe itching after lung surgery     Past Surgical History:   Procedure Laterality Date    HX APPENDECTOMY      HX BLADDER SUSPENSION  around 2004    HX BREAST LUMPECTOMY  2001    left w/nodes removed    HX CATARACT REMOVAL Bilateral 2013    HX HYSTERECTOMY  1985    ANTON    HX ORTHOPAEDIC Right 1980's    bunion removal    HX OTHER SURGICAL  3/5/2013    RLL superior segmentectomy    HX OTHER SURGICAL  3/11/2013    Resection of RLL margins    HX UROLOGICAL      Bladder tuck    SC CHEST [address] [place] [address], ,     right lung (see \"other\")    TX THORACOSCOPY SURG LOBECTOMY  1996    RUL lung cancer      Family History   Problem Relation Age of Onset    Cancer Mother         colon    Cancer Father         prostate    Cancer Sister         breast    Cancer Other         breast    Ovarian Cancer Other     No Known Problems Brother     No Known Problems Maternal Grandmother     No Known Problems Maternal Grandfather     No Known Problems Paternal Grandmother     No Known Problems Paternal Grandfather      Social History     Tobacco Use    Smoking status: Former Smoker     Packs/day: 1.00     Years: 60.00     Pack years: 60.00     Types: Cigarettes     Quit date: 3/4/2013     Years since quittin.7    Smokeless tobacco: Never Used   Substance Use Topics    Alcohol use: No      Prior to Admission medications    Medication Sig Start Date End Date Taking? Authorizing Provider   rosuvastatin (Crestor) 10 mg tablet Take 10 mg by mouth nightly. Yes Provider, Historical   iron polysaccharides (FERREX 150) 150 mg iron capsule Take 150 mg by mouth Daily (before breakfast). Yes Provider, Historical   folic acid (FOLVITE) 1 mg tablet Take  by mouth Daily (before breakfast). Yes Provider, Historical   metoprolol succinate (TOPROL-XL) 50 mg XL tablet Take 1 Tab by mouth daily. 14  Yes Rosie , MD   fluticasone-salmeterol (ADVAIR DISKUS) 250-50 mcg/dose diskus inhaler Take 1 Puff by inhalation every twelve (12) hours. Yes Provider, Historical   albuterol (VENTOLIN HFA) 90 mcg/Actuation inhaler Take 2 Puffs by inhalation every six (6) hours as needed. Yes Provider, Historical   esomeprazole (NEXIUM) 40 mg capsule Take 40 mg by mouth Daily (before breakfast). Yes Provider, Historical   oxyCODONE-acetaminophen (PERCOCET 10)  mg per tablet Take 1 tablet by mouth.     Provider, Historical   diphenhydrAMINE (BENADRYL) 25 mg tablet Take 25 mg by mouth every six (6) hours as needed (taken during allergy season). Provider, Historical   acetaminophen (TYLENOL) 500 mg tablet Take 500 mg by mouth every six (6) hours as needed. Provider, Historical   simvastatin (ZOCOR) 20 mg tablet Take 20 mg by mouth nightly. Provider, Historical        Allergies   Allergen Reactions    Contrast Agent [Iodine] Rash    Adhesive Tape-Silicones Itching and Contact Dermatitis       Review of Systems:  Review of Systems   Constitutional: Negative for fever. HENT: Positive for sore throat. Respiratory: Positive for cough and shortness of breath. Cardiovascular: Negative for chest pain and palpitations. Gastrointestinal: Negative for abdominal pain, diarrhea, heartburn, nausea and vomiting. Genitourinary: Negative for dysuria and urgency. Neurological: Negative for dizziness and headaches. Objective:     Vitals:    11/13/21 1002 11/13/21 1004 11/13/21 1102 11/13/21 1200   BP: 135/63  (!) 138/95 (!) 114/58   Pulse: (!) 115  (!) 111 (!) 106   Resp: 20  20 20   Temp:    99.4 °F (37.4 °C)   SpO2: 100% 100% 100% 98%   Weight:       Height:            Physical Exam:  Physical Exam  Constitutional:       General: She is not in acute distress. HENT:      Mouth/Throat:      Comments: Oral thrush on tongue  Cardiovascular:      Rate and Rhythm: Normal rate and regular rhythm. Heart sounds: Murmur heard. Pulmonary:      Comments: Scattered coarse sounds, diminished in right base  Abdominal:      General: Bowel sounds are normal.      Palpations: Abdomen is soft. Musculoskeletal:         General: Normal range of motion. Cervical back: Normal range of motion and neck supple. No tenderness. Lymphadenopathy:      Cervical: No cervical adenopathy. Skin:     General: Skin is warm and dry. Neurological:      Mental Status: She is oriented to person, place, and time.    Psychiatric:         Mood and Affect: Mood normal.         Behavior: Behavior normal. Recent Results (from the past 24 hour(s))   COVID-19 RAPID TEST    Collection Time: 11/13/21 10:01 AM   Result Value Ref Range    Specimen source Please find results under separate order      COVID-19 rapid test Not Detected Not Detected     CBC WITH AUTOMATED DIFF    Collection Time: 11/13/21 10:05 AM   Result Value Ref Range    WBC 17.3 (H) 3.6 - 11.0 K/uL    RBC 4.05 3.80 - 5.20 M/uL    HGB 11.9 11.5 - 16.0 g/dL    HCT 38.9 35.0 - 47.0 %    MCV 96.0 80.0 - 99.0 FL    MCH 29.4 26.0 - 34.0 PG    MCHC 30.6 30.0 - 36.5 g/dL    RDW 13.4 11.5 - 14.5 %    PLATELET 642 256 - 490 K/uL    MPV 9.5 8.9 - 12.9 FL    NRBC 0.0 0.0  WBC    ABSOLUTE NRBC 0.00 0.00 - 0.01 K/uL    NEUTROPHILS 86 (H) 32 - 75 %    LYMPHOCYTES 6 (L) 12 - 49 %    MONOCYTES 7 5 - 13 %    EOSINOPHILS 0 0 - 7 %    BASOPHILS 0 0 - 1 %    IMMATURE GRANULOCYTES 1 (H) 0 - 0.5 %    ABS. NEUTROPHILS 14.9 (H) 1.8 - 8.0 K/UL    ABS. LYMPHOCYTES 1.1 0.8 - 3.5 K/UL    ABS. MONOCYTES 1.2 (H) 0.0 - 1.0 K/UL    ABS. EOSINOPHILS 0.0 0.0 - 0.4 K/UL    ABS. BASOPHILS 0.0 0.0 - 0.1 K/UL    ABS. IMM. GRANS. 0.1 (H) 0.00 - 0.04 K/UL    DF AUTOMATED     METABOLIC PANEL, COMPREHENSIVE    Collection Time: 11/13/21 10:05 AM   Result Value Ref Range    Sodium 138 136 - 145 mmol/L    Potassium 3.7 3.5 - 5.1 mmol/L    Chloride 101 97 - 108 mmol/L    CO2 31 21 - 32 mmol/L    Anion gap 6 5 - 15 mmol/L    Glucose 124 (H) 65 - 100 mg/dL    BUN 11 6 - 20 mg/dL    Creatinine 0.64 0.55 - 1.02 mg/dL    BUN/Creatinine ratio 17 12 - 20      GFR est AA >60 >60 ml/min/1.73m2    GFR est non-AA >60 >60 ml/min/1.73m2    Calcium 9.2 8.5 - 10.1 mg/dL    Bilirubin, total 0.6 0.2 - 1.0 mg/dL    AST (SGOT) 10 (L) 15 - 37 U/L    ALT (SGPT) 16 12 - 78 U/L    Alk.  phosphatase 88 45 - 117 U/L    Protein, total 7.2 6.4 - 8.2 g/dL    Albumin 3.3 (L) 3.5 - 5.0 g/dL    Globulin 3.9 2.0 - 4.0 g/dL    A-G Ratio 0.8 (L) 1.1 - 2.2     TROPONIN-HIGH SENSITIVITY    Collection Time: 11/13/21 10:05 AM   Result Value Ref Range    Troponin-High Sensitivity 53 (HH) 0 - 51 ng/L   NT-PRO BNP    Collection Time: 11/13/21 10:05 AM   Result Value Ref Range    NT pro-BNP 1,126 (H) <450 pg/mL   PROTHROMBIN TIME + INR    Collection Time: 11/13/21 10:05 AM   Result Value Ref Range    Prothrombin time 12.9 11.9 - 14.7 sec    INR 1.0 0.9 - 1.1     PTT    Collection Time: 11/13/21 10:05 AM   Result Value Ref Range    aPTT 32.3 21.2 - 34.1 sec    aPTT, therapeutic range   82 - 109 sec   LACTIC ACID    Collection Time: 11/13/21 10:05 AM   Result Value Ref Range    Lactic acid 1.1 0.4 - 2.0 mmol/L   PROCALCITONIN    Collection Time: 11/13/21 10:05 AM   Result Value Ref Range    Procalcitonin 0.65 (H) 0 ng/mL   INFLUENZA A & B AG (RAPID TEST)    Collection Time: 11/13/21 10:05 AM   Result Value Ref Range    Influenza A Antigen Negative Negative      Influenza B Antigen Negative Negative         XR Results (maximum last 3): Results from [place] encounter on 11/13/21    XR CHEST SNGL V    Narrative  Chest one view. No comparison. Portable AP upright view at 1008 dated 11/13/2021. There has been a remote right thoracotomy. There is right hemithorax volume loss  and right apical pleural thickening/loculated pleural fluid. The heart is not enlarged. There is calcified plaque in the aorta. The left lung  is clear. There is no pneumothorax. There is a 6 mm linear radiodense structure overlying the left chest wall soft  tissues of uncertain significance. Impression  Postsurgical findings as above. Results from [place] encounter on 12/10/20    XR SPINE THORAC 2 V    Narrative  2 view    Mild diffuse DDD. No fracture or bone destruction      Results from Hospital Encounter encounter on 12/19/14    XR CHEST PORT    Narrative  **Final Report**      ICD Codes / Adm. Diagnosis: 162.5   / LUNG CANCER  Examination:  CR CHEST PORT  - [id number] - Dec 19 2014 10:56AM  Accession No:  [id number]  Reason:  charlene cath placement      REPORT:  EXAM:  CR CHEST PORT    INDICATION:  charlene cath placement    COMPARISON:  6/4/14    FINDINGS: A portable AP radiograph of the chest was obtained at 1045 hours. The patient is on a cardiac monitor. The right subclavian Port-A-Cath has  its tip overlying the superior vena cava. Postoperative pleural thickening  at the right apex is noted. The heart size is normal. The lungs demonstrate a very shallow depth  inspiration. There is a suggestion of mild interstitial edema correlation  with fluid balance is suggested. Impression  :  1. Port-A-Cath in satisfactory position  2. No pneumothorax  3. Low lung volumes with possible mild interstitial edema. The interstitial  edema like pattern may be more apparent than real due to the portable  technique and the shallow depth of inspiration. Signing/Reading Doctor: Mike Duong ([id number])  Approved: Mike Duong ([id number])  Dec 19 2014 11:07AM      CT Results (maximum last 3): Results from [facility] encounter on 04/01/14    CT CHEST W CONT      Results from [place] encounter on 02/25/13    CT CHEST W CONT    Narrative  **Final Report**      ICD Codes / Adm. Diagnosis: 786.6   / Swelling, mass, or lump in rachel  Examination:  CT Marie Arnold CON  - [id number] - Feb 25 2013 10:51AM  Accession No:  [id number]  Reason:  chest mass      REPORT:  EXAM:  CT THORAX W CON    INDICATION:  Lung cancer and breast cancer in the past. Right lower lobe  pleural-based opacity with mild metabolic activity on PET. COMPARISON: CT chest on August 13, 2012. PET/CT on March 14, 2012. TECHNIQUE: Helical post IV contrast 94 cc Optiray-320 CT of the chest with  coronal and sagittal reformats. FINDINGS: The cardiac size is within normal limits. No pericardial effusion. No lymphadenopathy by CT size criteria.     Pleural-based opacity in the posterior aspect of the right lower lobe  superior segment has slightly decreased in size and density, measuring 2.4 x  1.3 x 3.6 cm. Unchanged nodular scar in the posterior basilar segment of the  right lower lobe. No new lung mass or nodule. No evidence of pneumonia. No  pneumothorax or pleural effusion. The central airways are unremarkable. In the limited images of the upper abdomen, the partially imaged solid  organs are unremarkable. Bones are within normal limits. IMPRESSION:  1. Slight increase in size of the nonspecific right lower lobe pleural-based  opacity since last year. PET may provide more specificity. 2. No new finding. Signing/Reading Doctor: Syed Hogan ([id number])  Approved: Syed Hogan ([id number])  Feb 25 2013 12:35PM      Results from Hospital Encounter encounter on 08/13/12    CT CHEST WO CONT    Narrative  **Final Report**      ICD Codes / Adm. Diagnosis: 786.6   / Swelling, mass, or lump in rachel  Examination:  CT THORAX WO CON  - [id number] - Aug 13 2012  9:56AM  Accession No:  [id number]  Reason:  rt lung mass      REPORT:  CLINICAL HISTORY: Right lung mass  INDICATION: Right lung mass      COMPARISON: Correlation with PET-CT 03/14/1912      TECHNIQUE: CT of the chest is performed. Axial images from the thoracic  inlet to the level of the upper abdomen are obtained. Manual post-processing  of the images and coronal reformatting is also performed. Standard dose  modulation was utilized to reduce the overall radiation dose administered to  the patient. FINDINGS:  Pleural based scarring and consolidation in the superior segment of the  right lower lobe. Mild underlying emphysematous changes. This lesion was  nonhypermetabolic on PET examination of 3/14 and the appearance is largely  unchanged. Right apical scarring. Postprocedural changes in the right upper  lobe. Basilar atelectasis. There is no mediastinal, axillary or hilar  lymphadenopathy. There is no pleural or pericardial effusion. The aorta is  normal in course and caliber.   No lytic or blastic lesions are identified. IMPRESSION:  Stable pleural based lesion in the superior segment of the right lower lobe. Mild underlying emphysematous changes. Continued imaging followup is suggested. Signing/Reading Doctor: Leonard Fisher ([id number])  Approved: Leonard Fisher ([id number])  08/13/2012      MRI Results (maximum last 3): No results found for this or any previous visit. Nuclear Medicine Results (maximum last 3): No results found for this or any previous visit. US Results (maximum last 3): No results found for this or any previous visit. Active Problems:    Sepsis ([facility] (11/13/2021)      Acute and chronic respiratory failure with hypoxia ([facility] [facility] (11/13/2021)        Assessment/Plan:   1. Acute on chronic respiratory failure with hypoxia-  wean O2 back to baseline,  Rapid covid test negative, duo nebs prn,  doxycycline x 5 days  Restart maintenance inhaler, consult to pulmonary. 2. Hypertension- on metoprolol. 3. Hx of RLL adenocarcinoma- had right thoracotomy in past, not on chemotherapy or radiation at present time, follow with oncology as outpatient.     4. Elevated troponin- mild rise, repeat x 1 ,EKG without ischemia,  No chest pain    5. GI prophylaxis- PPI      DVT Prophylaxis lovenox  Code Status  Full  POA daughter Sirisha Chawla   Total Time 35 minutes      Signed By: Henrine Meigs, NP     November 13, 2021

## 2021-11-13 NOTE — PROGRESS NOTES
Dual skin assessment performed with Xena Bishop RN and Jhonny Mckeon RN. Skin is dry and intact, no breakdown present.

## 2021-11-14 LAB — TROPONIN-HIGH SENSITIVITY: 30 NG/L (ref 0–51)

## 2021-11-14 PROCEDURE — 94640 AIRWAY INHALATION TREATMENT: CPT | Performed by: INTERNAL MEDICINE

## 2021-11-14 PROCEDURE — 74011000250 HC RX REV CODE- 250: Performed by: INTERNAL MEDICINE

## 2021-11-14 PROCEDURE — 94760 N-INVAS EAR/PLS OXIMETRY 1: CPT

## 2021-11-14 PROCEDURE — 74011250636 HC RX REV CODE- 250/636: Performed by: NURSE PRACTITIONER

## 2021-11-14 PROCEDURE — 94640 AIRWAY INHALATION TREATMENT: CPT

## 2021-11-14 PROCEDURE — 65270000029 HC RM PRIVATE

## 2021-11-14 PROCEDURE — 74011250636 HC RX REV CODE- 250/636: Performed by: INTERNAL MEDICINE

## 2021-11-14 PROCEDURE — 74011250637 HC RX REV CODE- 250/637: Performed by: NURSE PRACTITIONER

## 2021-11-14 PROCEDURE — 77010033678 HC OXYGEN DAILY

## 2021-11-14 PROCEDURE — 36415 COLL VENOUS BLD VENIPUNCTURE: CPT

## 2021-11-14 PROCEDURE — 84484 ASSAY OF TROPONIN QUANT: CPT

## 2021-11-14 RX ORDER — IPRATROPIUM BROMIDE AND ALBUTEROL SULFATE 2.5; .5 MG/3ML; MG/3ML
3 SOLUTION RESPIRATORY (INHALATION)
Status: DISCONTINUED | OUTPATIENT
Start: 2021-11-14 | End: 2021-11-16 | Stop reason: HOSPADM

## 2021-11-14 RX ADMIN — IPRATROPIUM BROMIDE AND ALBUTEROL SULFATE 3 ML: .5; 2.5 SOLUTION RESPIRATORY (INHALATION) at 14:45

## 2021-11-14 RX ADMIN — METHYLPREDNISOLONE SODIUM SUCCINATE 40 MG: 125 INJECTION, POWDER, FOR SOLUTION INTRAMUSCULAR; INTRAVENOUS at 14:03

## 2021-11-14 RX ADMIN — DOXYCYCLINE HYCLATE 100 MG: 100 CAPSULE ORAL at 22:12

## 2021-11-14 RX ADMIN — METHYLPREDNISOLONE SODIUM SUCCINATE 40 MG: 125 INJECTION, POWDER, FOR SOLUTION INTRAMUSCULAR; INTRAVENOUS at 18:15

## 2021-11-14 RX ADMIN — BUDESONIDE AND FORMOTEROL FUMARATE DIHYDRATE 2 PUFF: 160; 4.5 AEROSOL RESPIRATORY (INHALATION) at 19:58

## 2021-11-14 RX ADMIN — ONDANSETRON 4 MG: 2 INJECTION INTRAMUSCULAR; INTRAVENOUS at 06:19

## 2021-11-14 RX ADMIN — PANTOPRAZOLE SODIUM 40 MG: 40 TABLET, DELAYED RELEASE ORAL at 08:58

## 2021-11-14 RX ADMIN — GUAIFENESIN 1200 MG: 600 TABLET ORAL at 22:10

## 2021-11-14 RX ADMIN — GUAIFENESIN DEXTROMETHORPHAN HYDROBROMIDE ORAL SOLUTION 10 ML: 10; 100 SOLUTION ORAL at 06:19

## 2021-11-14 RX ADMIN — ACETAMINOPHEN 500 MG: 500 TABLET ORAL at 06:19

## 2021-11-14 RX ADMIN — METOPROLOL SUCCINATE 50 MG: 50 TABLET, EXTENDED RELEASE ORAL at 08:57

## 2021-11-14 RX ADMIN — ATORVASTATIN CALCIUM 20 MG: 20 TABLET, FILM COATED ORAL at 22:11

## 2021-11-14 RX ADMIN — NYSTATIN 500000 UNITS: 100000 SUSPENSION ORAL at 14:07

## 2021-11-14 RX ADMIN — ONDANSETRON 4 MG: 2 INJECTION INTRAMUSCULAR; INTRAVENOUS at 12:08

## 2021-11-14 RX ADMIN — NYSTATIN 500000 UNITS: 100000 SUSPENSION ORAL at 18:14

## 2021-11-14 RX ADMIN — NYSTATIN 500000 UNITS: 100000 SUSPENSION ORAL at 22:11

## 2021-11-14 RX ADMIN — NYSTATIN 500000 UNITS: 100000 SUSPENSION ORAL at 08:58

## 2021-11-14 RX ADMIN — ACETAMINOPHEN 500 MG: 500 TABLET ORAL at 18:21

## 2021-11-14 RX ADMIN — DOXYCYCLINE HYCLATE 100 MG: 100 CAPSULE ORAL at 08:58

## 2021-11-14 RX ADMIN — BUDESONIDE AND FORMOTEROL FUMARATE DIHYDRATE 2 PUFF: 160; 4.5 AEROSOL RESPIRATORY (INHALATION) at 09:36

## 2021-11-14 RX ADMIN — IPRATROPIUM BROMIDE AND ALBUTEROL SULFATE 3 ML: .5; 2.5 SOLUTION RESPIRATORY (INHALATION) at 19:57

## 2021-11-14 RX ADMIN — ENOXAPARIN SODIUM 40 MG: 40 INJECTION SUBCUTANEOUS at 14:03

## 2021-11-14 RX ADMIN — GUAIFENESIN 1200 MG: 600 TABLET ORAL at 08:57

## 2021-11-14 NOTE — PROGRESS NOTES
Hospitalist Progress Note    Subjective:   Daily Progress Note: 11/14/2021 3:21 PM    Hospital Course:     Dayo Martinez is a 78 y.o. female who comes to the hospital with one day complaints of dyspnea, associated cough and reported had nausea/vomiting and diarrhea yesterday at home. She has history of right lung lower lobe adenocarcinoma,s/p right thoracotomy, and is on continuous oxygen at home, however presented with hypoxia requiring NC O2 at 4 L . Presenting CXR showing left lung clear , no pneumothorax, and right apical pleural thickening/loculated pleural fluid. PMH includes hypertension, COPD, CAD, asthma, GERD, and arthritis. She will be admitted for further evaluation and treatment. Subjective:  Examined patient at the bedside. She continues to be very short of breath with a congested cough.     Current Facility-Administered Medications   Medication Dose Route Frequency    albuterol-ipratropium (DUO-NEB) 2.5 MG-0.5 MG/3 ML  3 mL Nebulization Q6HWA RT    methylPREDNISolone (PF) (Solu-MEDROL) injection 40 mg  40 mg IntraVENous Q6H    acetaminophen (TYLENOL) tablet 500 mg  500 mg Oral Q6H PRN    albuterol (PROVENTIL HFA, VENTOLIN HFA, PROAIR HFA) inhaler 2 Puff  2 Puff Inhalation Q6H PRN    pantoprazole (PROTONIX) tablet 40 mg  40 mg Oral DAILY    budesonide-formoteroL (SYMBICORT) 160-4.5 mcg/actuation HFA inhaler 2 Puff  2 Puff Inhalation BID    folic acid (FOLVITE) tablet 1 mg  1 mg Oral DAILY    metoprolol succinate (TOPROL-XL) XL tablet 50 mg  50 mg Oral DAILY    atorvastatin (LIPITOR) tablet 20 mg  20 mg Oral QHS    ondansetron (ZOFRAN) injection 4 mg  4 mg IntraVENous Q6H PRN    hydrOXYzine pamoate (VISTARIL) capsule 25 mg  25 mg Oral TID PRN    melatonin tablet 3 mg  3 mg Oral QHS PRN    enoxaparin (LOVENOX) injection 40 mg  40 mg SubCUTAneous Q24H    doxycycline (VIBRAMYCIN) capsule 100 mg  100 mg Oral Q12H    guaiFENesin ER (MUCINEX) tablet 1,200 mg  1,200 mg Oral Q12H    guaiFENesin-dextromethorphan (TUSSI-ORGANIDIN DM)  mg/5 mL oral solution 10 mL  10 mL Oral Q4H PRN    prochlorperazine (COMPAZINE) with saline injection 10 mg  10 mg IntraVENous Q6H PRN    nystatin (MYCOSTATIN) 100,000 unit/mL oral suspension 500,000 Units  500,000 Units Oral QID        REVIEW OF SYSTEMS    Review of Systems   Constitutional: Positive for malaise/fatigue. HENT: Positive for congestion. Respiratory: Positive for cough, sputum production, shortness of breath and wheezing. Musculoskeletal: Positive for back pain. Neurological: Positive for speech change and weakness. Objective:     Visit Vitals  /73 (BP 1 Location: Left upper arm, BP Patient Position: At rest)   Pulse 96   Temp 98.8 °F (37.1 °C)   Resp 18   Ht 5' 6\" (1.676 m)   Wt 79.4 kg (175 lb)   SpO2 95%   Breastfeeding No   BMI 28.25 kg/m²    O2 Flow Rate (L/min): 4 l/min O2 Device: Nasal cannula    Temp (24hrs), Av.2 °F (36.8 °C), Min:97.3 °F (36.3 °C), Max:98.8 °F (37.1 °C)      No intake/output data recorded. No intake/output data recorded. PHYSICAL EXAM:    Physical Exam  Constitutional:       Appearance: She is ill-appearing. HENT:      Nose: Congestion present. Cardiovascular:      Rate and Rhythm: Regular rhythm. Tachycardia present. Pulmonary:      Effort: Respiratory distress present. Breath sounds: Wheezing present. Abdominal:      General: There is no distension. Neurological:      Motor: Weakness present.           Data Review    Recent Results (from the past 24 hour(s))   URINALYSIS W/ REFLEX CULTURE    Collection Time: 21  5:30 PM    Specimen: Urine   Result Value Ref Range    Color Yellow/Straw      Appearance Clear Clear      Specific gravity 1.010 1.003 - 1.030      pH (UA) 5.0 5.0 - 8.0      Protein Negative Negative mg/dL    Glucose Negative Negative mg/dL    Ketone 5 (A) Negative mg/dL    Bilirubin Negative Negative      Blood Small (A) Negative      Urobilinogen 0.1 0.1 - 1.0 EU/dL    Nitrites Negative Negative      Leukocyte Esterase Negative Negative      WBC 5-10 0 - 4 /hpf    RBC 0-5 0 - 5 /hpf    Bacteria Negative Negative /hpf    UA:UC IF INDICATED Culture not indicated by UA result Culture not indicated by UA result      Mucus Trace (A) Negative /lpf    Hyaline cast 2-5 0 - 5 /lpf   TROPONIN-HIGH SENSITIVITY    Collection Time: 11/14/21  9:40 AM   Result Value Ref Range    Troponin-High Sensitivity 30 0 - 51 ng/L       XR CHEST SNGL V   Final Result   Postsurgical findings as above.       CT CHEST WO CONT    (Results Pending)       Active Problems:    Sepsis ([facility] [address] (11/13/2021)      Acute and chronic respiratory failure with hypoxia ([facility] (11/13/2021)        Assessment/Plan:     Acute on chronic respiratory failure with hypoxia-   wean O2 back to baseline 2 L as tolerated  duo nebs prn,  doxycycline x 5 days  Pulmonary consulting  Continue Symbicort  Added IV Solu-Medrol  Consider ENT evaluation of vocal cords if hoarseness does not improve    Hypertension-   Tachycardia-  on metoprolol  Cardiology consulted     Hx of RLL adenocarcinoma-   had right thoracotomy in past, not on chemotherapy or radiation at present time, follow with oncology as outpatient     Elevated troponin-   mild rise, repeat x 1 ,EKG without ischemia,  No chest pain, does have back pain  2D echo pending  Cardiology following         DVT Prophylaxis: Lovenox  Code Status: Full Code  POA/NOK: daughter Wade Morataya     Disposition and discharge barriers:   · Treat hypoxia  · IV steroids  · Possible ENT eval  · 2D echo pending  Care Plan discussed with:   _____________________________________________________________________________  Time spent in direct care including coordination of service, review of data and examination: > 35 minutes    ______________________________________________________________________________    Dave Dominguez NP    This is dictation was done by computer rancho voice recognition software. Quite often unanticipated grammatical, syntax, homophones and other interpretive errors or inadvertently transcribed by the computer software. Please excuse errors that have escaped final proofreading. Thank you.

## 2021-11-14 NOTE — CONSULTS
Consult  Pulmonary, Critical Care    Name: Amarjit Phillip MRN: [id number]   : 1940 Hospital: [address] [place] [address]   Date: 2021  Admission date: 2021 Hospital Day: 2       Subjective/Interval History:   Patient seen on the medical floor. She was admitted to the hospital with worsening shortness of breath and cough. She states the cough and shortness of breath gradually worsened over the last 3 or 4 days. Very little sputum brought up in with what is brought up is white. She is noted no fever chills or sweats. She has become hoarse today but did not have that prior to today. She does have a history of lung cancer adenocarcinoma removed in . She had recurrence in  and underwent a second resection. There may have been a third resection at the end of  and at that point she started chemo therapy and radiation therapy. Her last PET scan from 2021 showed FDG avid uptake in the right apex consistent with prior treatment no evidence of metastatic disease    Hospital Problems  Date Reviewed: 2021          Codes Class Noted POA    Sepsis ([facility] ICD-10-CM: A41.9  ICD-9-CM: 038.9, 995.91  2021 Unknown        Acute and chronic respiratory failure with hypoxia [facility]) ICD-10-CM: J96.21  ICD-9-CM: 518.84, 799.02  2021 Unknown              IMPRESSION:   1. Acute hypoxic respiratory failure  2. Chronic hypoxic respiratory failure normally on 2 L oxygen  3. Acute exacerbation COPD  4. History lung cancer last resection treatment   5. Hoarseness likely due to cough and acute bronchitis cannot rule out recurrent tumor  6. Body mass index is 28.25 kg/m². 7.       RECOMMENDATIONS/PLAN:   1. We will add nebulized albuterol Atrovent every 6 hours while awake  2. Add IV Solu-Medrol  3. Continue Symbicort to replace her Advair  4. We will check CT of the chest to see if any evidence of recurrent tumor.   5. If CT is suspicious or if hoarseness does not clear will have ENT evaluate for vocal cord paralysis  6. [x] High complexity decision making was performed  [x] See my orders for details      Subjective/Initial History:     I was asked by Juan Carlos Mojica MD to see Tamela Christie  a 78 y.o.    female in consultation for a chief complaint of shortness of breath cough wheezing      Allergies   Allergen Reactions    Contrast Agent [Iodine] Rash    Adhesive Tape-Silicones Itching and Contact Dermatitis        MAR reviewed and pertinent medications noted or modified as needed     Current Facility-Administered Medications   Medication    albuterol-ipratropium (DUO-NEB) 2.5 MG-0.5 MG/3 ML    methylPREDNISolone (PF) (Solu-MEDROL) injection 40 mg    acetaminophen (TYLENOL) tablet 500 mg    albuterol (PROVENTIL HFA, VENTOLIN HFA, PROAIR HFA) inhaler 2 Puff    pantoprazole (PROTONIX) tablet 40 mg    budesonide-formoteroL (SYMBICORT) 160-4.5 mcg/actuation HFA inhaler 2 Puff    folic acid (FOLVITE) tablet 1 mg    metoprolol succinate (TOPROL-XL) XL tablet 50 mg    atorvastatin (LIPITOR) tablet 20 mg    ondansetron (ZOFRAN) injection 4 mg    hydrOXYzine pamoate (VISTARIL) capsule 25 mg    melatonin tablet 3 mg    enoxaparin (LOVENOX) injection 40 mg    doxycycline (VIBRAMYCIN) capsule 100 mg    guaiFENesin ER (MUCINEX) tablet 1,200 mg    guaiFENesin-dextromethorphan (TUSSI-ORGANIDIN DM)  mg/5 mL oral solution 10 mL    prochlorperazine (COMPAZINE) with saline injection 10 mg    nystatin (MYCOSTATIN) 100,000 unit/mL oral suspension 500,000 Units      Patient PCP: Pablo Starks MD  PMH:  has a past medical history of Arrhythmia, Arthritis, Asthma, Cancer ([address] (1996), Cancer ([address] (2001), Cancer ([address] (2013, 2014), COPD, Dyslipidemia (4/29/2014), GERD (gastroesophageal reflux disease), Hyperlipidemia, Irregular heart beat (1st-1983,early 2000's), Lung cancer ([address] (4/29/2014), Lung cancer, lower lobe ([address] (3/2013), Other ill-defined conditions(799.89), and Unspecified adverse effect of anesthesia. PSH:   has a past surgical history that includes pr thoracoscopy surg lobectomy (1996); hx bladder suspension (around 2004); hx appendectomy; hx hysterectomy (1985); hx other surgical (3/5/2013); hx other surgical (3/11/2013); hx cataract removal (Bilateral, 2013); hx breast lumpectomy (2001); hx urological; hx orthopaedic (Right, 1980's); and pr chest surgery procedure unlisted (1996, 2013, 2014). FHX: family history includes Cancer in her father, mother, sister, and another family member; No Known Problems in her brother, maternal grandfather, maternal grandmother, paternal grandfather, and paternal grandmother; Ovarian Cancer in an other family member. SHX:  reports that she quit smoking about 8 years ago. Her smoking use included cigarettes. She has a 60.00 pack-year smoking history. She has never used smokeless tobacco. She reports that she does not drink alcohol and does not use drugs. Systemic review:  General states her weight stable no chronic fever chills or sweats  Eyes no double vision or momentary blindness  ENT no facial pain over the sinuses or drainage  Endocrinologic no polyuria polydipsia  Musculoskeletal no swollen tender joints no muscle aches or pains  Neurologic no seizures or syncope  Gastrointestinal no nausea vomiting acid indigestion does have a history of reflux states that as long as she takes her Prilosec she is asymptomatic  Genitourinary no pain or discomfort on urination no bleeding  Cardiovascular no history of heart disease chest pain diaphoresis she does have some chronic ankle edema states it is unchanged over several years  Respiratory does have onset hoarseness but just today states her voice was normal yesterday.   Has had worsening cough shortness of breath and wheezing with this acute illness    Objective:     Vital Signs: Telemetry:    normal sinus rhythm Intake/Output:   Visit Vitals  /73 (BP 1 Location: Left upper arm, BP Patient Position: At rest)   Pulse 96   Temp 98.8 °F (37.1 °C)   Resp 18   Ht 5' 6\" (1.676 m)   Wt 79.4 kg (175 lb)   SpO2 95%   Breastfeeding No   BMI 28.25 kg/m²       Temp (24hrs), Av.4 °F (36.9 °C), Min:97.3 °F (36.3 °C), Max:99.1 °F (37.3 °C)        O2 Device: Nasal cannula O2 Flow Rate (L/min): 4 l/min       Wt Readings from Last 4 Encounters:   21 79.4 kg (175 lb)   16 81.6 kg (180 lb)   14 86.1 kg (189 lb 13.1 oz)   12/15/14 87.1 kg (192 lb)        No intake or output data in the 24 hours ending 21 1303    Last shift:      No intake/output data recorded. Last 3 shifts: No intake/output data recorded. Physical Exam:   General:  female; lying in bed in no distress has hoarseness to her voice no accessory muscle use  HEENT: NCAT, normal oral mucosa  Eyes: anicteric; conjunctiva clear extraocular movements intact  Neck: no nodes, no JVD, no accessory MM use  Chest: no deformity,   Cardiac: Regular rate and rhythm no definite murmur has trace ankle edema  Lungs: Very poor air movement prolongation of exhalation mild but diffuse wheezing  Abd: Soft nontender normal bowel sounds  Ext: Trace edema; no joint swelling;  No clubbing  : clear urine  Neuro: Awake alert oriented speech is clear moves all 4 extremities  Psych- no agitation, oriented to person;   Skin: warm, dry, no cyanosis;  Pulses: Brachial and radial pulses intact  Capillary: Normal capillary refill    Labs:    Recent Labs     21  1005   WBC 17.3*   HGB 11.9      INR 1.0   APTT 32.3     Recent Labs     21  1005      K 3.7      CO2 31   *   BUN 11   CREA 0.64   CA 9.2   LAC 1.1   ALB 3.3*   ALT 16     Troponin 30, 56, 53  Nasal influenza smear negative  BNP 1126  Procalcitonin 0.65  PET scan 3/13/2021 with FDG avid uptake right apex consistent with previous treatment no evidence of metastatic disease  Lab Results   Component Value Date/Time Culture result: No growth after 19 hours 11/13/2021 10:05 AM    Culture result:  05/01/2014 02:42 PM     MRSA NOT PRESENT      Screening of patient nares for MRSA is for surveillance purposes and, if positive, to facilitate isolation considerations in high risk settings. It is not intended for automatic decolonization interventions per se as regimens are not sufficiently effective to warrant routine use. Lab Results   Component Value Date/Time    TSH 2.03 05/08/2014 07:43 PM       Imaging:    CXR Results  (Last 48 hours)               11/13/21 1014  XR CHEST SNGL V Final result    Impression:  Postsurgical findings as above. Narrative:  Chest one view. No comparison. Portable AP upright view at 1008 dated 11/13/2021. There has been a remote right thoracotomy. There is right hemithorax volume loss   and right apical pleural thickening/loculated pleural fluid. The heart is not enlarged. There is calcified plaque in the aorta. The left lung   is clear. There is no pneumothorax. There is a 6 mm linear radiodense structure overlying the left chest wall soft   tissues of uncertain significance. To me there is mild elevation right hemidiaphragm with right apical density unchanged from the previous x-ray of 10/10/2020           Results from Hospital Encounter encounter on 11/13/21    XR CHEST SNGL V    Narrative  Chest one view. No comparison. Portable AP upright view at 1008 dated 11/13/2021. There has been a remote right thoracotomy. There is right hemithorax volume loss  and right apical pleural thickening/loculated pleural fluid. The heart is not enlarged. There is calcified plaque in the aorta. The left lung  is clear. There is no pneumothorax. There is a 6 mm linear radiodense structure overlying the left chest wall soft  tissues of uncertain significance. Impression  Postsurgical findings as above.       Results from [place] encounter on 12/10/20    XR SPINE THORAC 2 V    Narrative  2 view    Mild diffuse DDD. No fracture or bone destruction      Results from Hospital Encounter encounter on 12/19/14    XR CHEST PORT    Narrative  **Final Report**      ICD Codes / Adm. Diagnosis: 162.5   / LUNG CANCER  Examination:  CR CHEST PORT  - [id number] - Dec 19 2014 10:56AM  Accession No:  [id number]  Reason:  charlene cath placement      REPORT:  EXAM:  CR CHEST PORT    INDICATION:  charlene cath placement    COMPARISON:  6/4/14    FINDINGS: A portable AP radiograph of the chest was obtained at 1045 hours. The patient is on a cardiac monitor. The right subclavian Port-A-Cath has  its tip overlying the superior vena cava. Postoperative pleural thickening  at the right apex is noted. The heart size is normal. The lungs demonstrate a very shallow depth  inspiration. There is a suggestion of mild interstitial edema correlation  with fluid balance is suggested. Impression  :  1. Port-A-Cath in satisfactory position  2. No pneumothorax  3. Low lung volumes with possible mild interstitial edema. The interstitial  edema like pattern may be more apparent than real due to the portable  technique and the shallow depth of inspiration. Signing/Reading Doctor: Skyler Navarro ([id number])  Approved: Skyler Navarro ([id number])  Dec 19 2014 11:07AM    Results from Abstract encounter on 04/01/14    CT CHEST W CONT      Discussion: Acute exacerbation COPD with leukocytosis may represent an acute bronchitis. She has been noted to have bronchiectasis in the right apex on past CTs. Density in the right apex probably unchanged from a previous x-ray of 10/2020 however cannot entirely rule out recurrent tumor. We will check a CT of the chest to see if any new abnormality seen on it. Her original tumor was an adenocarcinoma in 1996 with partial lobectomy.   She subsequently had recurrent tumor in 2014 and there is a note that states she had a resection and a third resection near the end of 2014 for recurrent tumor with subsequent evidence of recurrence on PET scan and underwent chemoradiation therapy.   Hopefully she has nothing more than bronchitis with exacerbation COPD causing her current admission and hoarseness  Diana Galvin MD

## 2021-11-14 NOTE — PROGRESS NOTES
Progress Note    Patient: Gali Leonard MRN: [id number]  SSN: [id number]    YOB: 1940  Age: 78 y.o. Sex: female      Admit Date: 11/13/2021    LOS: 1 day     Subjective:     No acute events overnight. She remains short winded. She remains with palpitation occasionally  Objective:     Vitals:    11/14/21 0400 11/14/21 0810 11/14/21 0937 11/14/21 1211   BP:  (!) 144/66  114/73   Pulse: (!) 113 (!) 108  96   Resp:  18  18   Temp:  97.3 °F (36.3 °C)  98.8 °F (37.1 °C)   SpO2:  96% 95%    Weight:       Height:            Intake and Output:  Current Shift: No intake/output data recorded. Last three shifts: No intake/output data recorded. Physical Exam:   General:  Alert, cooperative, no distress, appears stated age. Eyes:  Conjunctivae/corneas clear. PERRL, EOMs intact. Fundi benign   Ears:  Normal TMs and external ear canals both ears. Nose: Nares normal. Septum midline. Mucosa normal. No drainage or sinus tenderness. Mouth/Throat: Lips, mucosa, and tongue normal. Teeth and gums normal.   Neck: Supple, symmetrical, trachea midline, no adenopathy, thyroid: no enlargment/tenderness/nodules, no carotid bruit and no JVD. Back:   Symmetric, no curvature. ROM normal. No CVA tenderness. Lungs:    End expiratory wheezes bilaterally. Heart:  Regular rate and rhythm, S1, S2 normal, no murmur, click, rub or gallop. Abdomen:   Soft, non-tender. Bowel sounds normal. No masses,  No organomegaly. Extremities: Extremities normal, atraumatic, no cyanosis or edema. Pulses: 2+ and symmetric all extremities. Skin: Skin color, texture, turgor normal. No rashes or lesions   Lymph nodes: Cervical, supraclavicular, and axillary nodes normal.   Neurologic: CNII-XII intact. Normal strength, sensation and reflexes throughout. Lab/Data Review: All lab results for the last 24 hours reviewed.          Assessment:     Active Problems:    Sepsis ([address] (11/13/2021)      Acute and chronic respiratory failure with hypoxia ([facility] (11/13/2021)      Patient is an 25-year-old white female with:  1. Acute on chronic hypoxemic respiratory failure  2. Hypertension  3. SVT  4. Mild rise of troponin  5. GERD  6. Ex-smoker  7. History of lung adenocarcinoma status post right thoracotomy. Plan:     Patient was diffuse wheezes. She was seen by pulmonary today. Currently on atorvastatin and metoprolol. She is currently on IV Solu-Medrol. Plans noted for possible CT scan.   Echo pending    Signed By: Sophia Smiley MD     November 14, 2021

## 2021-11-14 NOTE — PROGRESS NOTES
Spoke with the pt initially who requested that I discuss DC planning with her daughter/POA [address] [facility] . Ms Steffan Holstein lives in a single level, single family home with x3 steps from outside to inside. She uses O2 x24h and obtains the DME from [facility]. S [employer] and her brother (second poa) provide for her ADLs and IADLs daily and will transport home at DC. The family will decline any [address] [place] [address] with family assistance.

## 2021-11-14 NOTE — PROGRESS NOTES
Reason for Admission:  SOB                     RUR Score: 10%                    Plan for utilizing home health:   None       PCP: First and Last name:  Dariana Lim MD     Name of Practice:    Are you a current patient: Yes/No: yes   Approximate date of last visit: 11/10/2021   Can you participate in a virtual visit with your PCP: no                    Current Advanced Directive/Advance Care Plan: Full Code      Healthcare Decision Maker:   Click here to complete [id number] including selection of the Healthcare Decision Maker Relationship (ie \"Primary\")                             Transition of Care Plan:Expectation is for the pt to return home with O2 to the care of her family

## 2021-11-15 ENCOUNTER — APPOINTMENT (OUTPATIENT)
Dept: CT IMAGING | Age: 81
DRG: 871 | End: 2021-11-15
Attending: INTERNAL MEDICINE
Payer: MEDICARE

## 2021-11-15 LAB
ANION GAP SERPL CALC-SCNC: 4 MMOL/L (ref 5–15)
BASOPHILS # BLD: 0 K/UL (ref 0–0.1)
BASOPHILS NFR BLD: 0 % (ref 0–1)
BUN SERPL-MCNC: 18 MG/DL (ref 6–20)
BUN/CREAT SERPL: 33 (ref 12–20)
CA-I BLD-MCNC: 8.9 MG/DL (ref 8.5–10.1)
CHLORIDE SERPL-SCNC: 102 MMOL/L (ref 97–108)
CO2 SERPL-SCNC: 35 MMOL/L (ref 21–32)
CREAT SERPL-MCNC: 0.54 MG/DL (ref 0.55–1.02)
DIFFERENTIAL METHOD BLD: ABNORMAL
EOSINOPHIL # BLD: 0 K/UL (ref 0–0.4)
EOSINOPHIL NFR BLD: 0 % (ref 0–7)
ERYTHROCYTE [DISTWIDTH] IN BLOOD BY AUTOMATED COUNT: 13.4 % (ref 11.5–14.5)
GLUCOSE SERPL-MCNC: 157 MG/DL (ref 65–100)
HCT VFR BLD AUTO: 34.5 % (ref 35–47)
HGB BLD-MCNC: 10.1 G/DL (ref 11.5–16)
IMM GRANULOCYTES # BLD AUTO: 0.1 K/UL (ref 0–0.04)
IMM GRANULOCYTES NFR BLD AUTO: 1 % (ref 0–0.5)
LYMPHOCYTES # BLD: 0.7 K/UL (ref 0.8–3.5)
LYMPHOCYTES NFR BLD: 9 % (ref 12–49)
MCH RBC QN AUTO: 29 PG (ref 26–34)
MCHC RBC AUTO-ENTMCNC: 29.3 G/DL (ref 30–36.5)
MCV RBC AUTO: 99.1 FL (ref 80–99)
MONOCYTES # BLD: 0.2 K/UL (ref 0–1)
MONOCYTES NFR BLD: 2 % (ref 5–13)
NEUTS SEG # BLD: 7 K/UL (ref 1.8–8)
NEUTS SEG NFR BLD: 88 % (ref 32–75)
NRBC # BLD: 0 K/UL (ref 0–0.01)
NRBC BLD-RTO: 0 PER 100 WBC
PLATELET # BLD AUTO: 218 K/UL (ref 150–400)
PMV BLD AUTO: 10 FL (ref 8.9–12.9)
POTASSIUM SERPL-SCNC: 4 MMOL/L (ref 3.5–5.1)
RBC # BLD AUTO: 3.48 M/UL (ref 3.8–5.2)
SODIUM SERPL-SCNC: 141 MMOL/L (ref 136–145)
WBC # BLD AUTO: 8 K/UL (ref 3.6–11)

## 2021-11-15 PROCEDURE — 85025 COMPLETE CBC W/AUTO DIFF WBC: CPT

## 2021-11-15 PROCEDURE — 74011000250 HC RX REV CODE- 250: Performed by: INTERNAL MEDICINE

## 2021-11-15 PROCEDURE — 71250 CT THORAX DX C-: CPT

## 2021-11-15 PROCEDURE — 94760 N-INVAS EAR/PLS OXIMETRY 1: CPT

## 2021-11-15 PROCEDURE — 77010033678 HC OXYGEN DAILY

## 2021-11-15 PROCEDURE — 80048 BASIC METABOLIC PNL TOTAL CA: CPT

## 2021-11-15 PROCEDURE — 74011000258 HC RX REV CODE- 258: Performed by: INTERNAL MEDICINE

## 2021-11-15 PROCEDURE — 94640 AIRWAY INHALATION TREATMENT: CPT

## 2021-11-15 PROCEDURE — 74011250637 HC RX REV CODE- 250/637: Performed by: NURSE PRACTITIONER

## 2021-11-15 PROCEDURE — 36415 COLL VENOUS BLD VENIPUNCTURE: CPT

## 2021-11-15 PROCEDURE — 74011250636 HC RX REV CODE- 250/636: Performed by: INTERNAL MEDICINE

## 2021-11-15 PROCEDURE — 65270000029 HC RM PRIVATE

## 2021-11-15 PROCEDURE — 74011250636 HC RX REV CODE- 250/636: Performed by: NURSE PRACTITIONER

## 2021-11-15 RX ORDER — CYCLOBENZAPRINE HCL 10 MG
10 TABLET ORAL
Status: DISCONTINUED | OUTPATIENT
Start: 2021-11-15 | End: 2021-11-16 | Stop reason: HOSPADM

## 2021-11-15 RX ADMIN — ACETAMINOPHEN 500 MG: 500 TABLET ORAL at 18:35

## 2021-11-15 RX ADMIN — IPRATROPIUM BROMIDE AND ALBUTEROL SULFATE 3 ML: .5; 2.5 SOLUTION RESPIRATORY (INHALATION) at 07:46

## 2021-11-15 RX ADMIN — BUDESONIDE AND FORMOTEROL FUMARATE DIHYDRATE 2 PUFF: 160; 4.5 AEROSOL RESPIRATORY (INHALATION) at 07:46

## 2021-11-15 RX ADMIN — ACETAMINOPHEN 500 MG: 500 TABLET ORAL at 04:36

## 2021-11-15 RX ADMIN — METOPROLOL SUCCINATE 50 MG: 50 TABLET, EXTENDED RELEASE ORAL at 08:00

## 2021-11-15 RX ADMIN — PIPERACILLIN AND TAZOBACTAM 3.38 G: 3; .375 INJECTION, POWDER, LYOPHILIZED, FOR SOLUTION INTRAVENOUS at 13:32

## 2021-11-15 RX ADMIN — NYSTATIN 500000 UNITS: 100000 SUSPENSION ORAL at 07:59

## 2021-11-15 RX ADMIN — PANTOPRAZOLE SODIUM 40 MG: 40 TABLET, DELAYED RELEASE ORAL at 08:01

## 2021-11-15 RX ADMIN — GUAIFENESIN 1200 MG: 600 TABLET ORAL at 21:46

## 2021-11-15 RX ADMIN — METHYLPREDNISOLONE SODIUM SUCCINATE 40 MG: 125 INJECTION, POWDER, FOR SOLUTION INTRAMUSCULAR; INTRAVENOUS at 18:28

## 2021-11-15 RX ADMIN — GUAIFENESIN 1200 MG: 600 TABLET ORAL at 08:01

## 2021-11-15 RX ADMIN — HYDROXYZINE PAMOATE 25 MG: 25 CAPSULE ORAL at 23:53

## 2021-11-15 RX ADMIN — DOXYCYCLINE HYCLATE 100 MG: 100 CAPSULE ORAL at 08:00

## 2021-11-15 RX ADMIN — ENOXAPARIN SODIUM 40 MG: 40 INJECTION SUBCUTANEOUS at 13:23

## 2021-11-15 RX ADMIN — NYSTATIN 500000 UNITS: 100000 SUSPENSION ORAL at 21:47

## 2021-11-15 RX ADMIN — PIPERACILLIN AND TAZOBACTAM 3.38 G: 3; .375 INJECTION, POWDER, LYOPHILIZED, FOR SOLUTION INTRAVENOUS at 18:27

## 2021-11-15 RX ADMIN — METHYLPREDNISOLONE SODIUM SUCCINATE 40 MG: 125 INJECTION, POWDER, FOR SOLUTION INTRAMUSCULAR; INTRAVENOUS at 01:04

## 2021-11-15 RX ADMIN — METHYLPREDNISOLONE SODIUM SUCCINATE 40 MG: 125 INJECTION, POWDER, FOR SOLUTION INTRAMUSCULAR; INTRAVENOUS at 23:45

## 2021-11-15 RX ADMIN — ATORVASTATIN CALCIUM 20 MG: 20 TABLET, FILM COATED ORAL at 21:46

## 2021-11-15 RX ADMIN — FOLIC ACID 1 MG: 1 TABLET ORAL at 08:01

## 2021-11-15 RX ADMIN — METHYLPREDNISOLONE SODIUM SUCCINATE 40 MG: 125 INJECTION, POWDER, FOR SOLUTION INTRAMUSCULAR; INTRAVENOUS at 13:22

## 2021-11-15 RX ADMIN — IPRATROPIUM BROMIDE AND ALBUTEROL SULFATE 3 ML: .5; 2.5 SOLUTION RESPIRATORY (INHALATION) at 19:24

## 2021-11-15 RX ADMIN — BUDESONIDE AND FORMOTEROL FUMARATE DIHYDRATE 2 PUFF: 160; 4.5 AEROSOL RESPIRATORY (INHALATION) at 19:25

## 2021-11-15 RX ADMIN — METHYLPREDNISOLONE SODIUM SUCCINATE 40 MG: 125 INJECTION, POWDER, FOR SOLUTION INTRAMUSCULAR; INTRAVENOUS at 05:05

## 2021-11-15 RX ADMIN — ACETAMINOPHEN 500 MG: 500 TABLET ORAL at 13:32

## 2021-11-15 RX ADMIN — ONDANSETRON 4 MG: 2 INJECTION INTRAMUSCULAR; INTRAVENOUS at 08:03

## 2021-11-15 RX ADMIN — GUAIFENESIN DEXTROMETHORPHAN HYDROBROMIDE ORAL SOLUTION 10 ML: 10; 100 SOLUTION ORAL at 04:58

## 2021-11-15 RX ADMIN — IPRATROPIUM BROMIDE AND ALBUTEROL SULFATE 3 ML: .5; 2.5 SOLUTION RESPIRATORY (INHALATION) at 13:54

## 2021-11-15 NOTE — PROGRESS NOTES
Pulmonary, Note    Name: Agnes Osullivan MRN: [id number]   : 1940 Hospital: [address] [place] [address]   Date: 11/15/2021  Admission date: 2021 Hospital Day: 3       Subjective/Interval History:   Patient seen on the medical floor. She was admitted to the hospital with worsening shortness of breath and cough. She states the cough and shortness of breath gradually worsened over the last 3 or 4 days. Very little sputum brought up in with what is brought up is white. She is noted no fever chills or sweats. She has become hoarse today but did not have that prior to today. She does have a history of lung cancer adenocarcinoma removed in . She had recurrence in  and underwent a second resection. There may have been a third resection at the end of  and at that point she started chemo therapy and radiation therapy. Her last PET scan from 2021 showed FDG avid uptake in the right apex consistent with prior treatment no evidence of metastatic disease    Hospital Problems  Date Reviewed: 2021          Codes Class Noted POA    Sepsis ([facility] ICD-10-CM: A41.9  ICD-9-CM: 038.9, 995.91  2021 Unknown        Acute and chronic respiratory failure with hypoxia [facility]) ICD-10-CM: J96.21  ICD-9-CM: 518.84, 799.02  2021 Unknown              IMPRESSION:   1. Acute hypoxic respiratory failure  2. Chronic hypoxic respiratory failure normally on 2 L oxygen  3. Acute exacerbation COPD  4. History lung cancer last resection treatment   5. Hoarseness likely due to cough and acute bronchitis   Body mass index is 28.25 kg/m². RECOMMENDATIONS/PLAN:   1.  History of lung cancer first diagnosed in  at [facility] right upper lobe tumor underwent lobectomy received radiation treatment then again  diagnosed with second lung cancer right lower lobe superior segment was adenocarcinoma patient received radiation treatment no chemotherapy at that time and again in 2014 patient had a 1.6 cm nodule Dr. Keiry Baker had done bronchoscopy VATS and superior segmentectomy and she was followed by oncologist because of recurrent metastatic non-small cell lung cancer currently on observation follow-up   2. She is on noninvasive ventilator trilogy to use it at night and daytime oxygen nasal cannula 2 L  3. We will add nebulized albuterol Atrovent every 6 hours while awake  4. Left lower lobe infiltrate pneumonia possible aspiration on doxycycline will discontinue start patient on Zosyn  5. On Solu-Medrol nebulizer treatment  6. Continue Symbicort to replace her Advair  7. CT chest shows postsurgical changes in the right lung  8. Borderline Enlarged left paratracheal lymph node  9. Compression fracture of the superior endplate of P40 which was present before          [x] High complexity decision making was performed  [x] See my orders for details      Subjective/Initial History:     I was asked by Lee Singh MD to see Debra Healy  a 78 y.o.    female in consultation for a chief complaint of shortness of breath cough wheezing      Allergies   Allergen Reactions    Contrast Agent [Iodine] Rash    Adhesive Tape-Silicones Itching and Contact Dermatitis        MAR reviewed and pertinent medications noted or modified as needed     Current Facility-Administered Medications   Medication    albuterol-ipratropium (DUO-NEB) 2.5 MG-0.5 MG/3 ML    methylPREDNISolone (PF) (Solu-MEDROL) injection 40 mg    acetaminophen (TYLENOL) tablet 500 mg    albuterol (PROVENTIL HFA, VENTOLIN HFA, PROAIR HFA) inhaler 2 Puff    pantoprazole (PROTONIX) tablet 40 mg    budesonide-formoteroL (SYMBICORT) 160-4.5 mcg/actuation HFA inhaler 2 Puff    folic acid (FOLVITE) tablet 1 mg    metoprolol succinate (TOPROL-XL) XL tablet 50 mg    atorvastatin (LIPITOR) tablet 20 mg    ondansetron (ZOFRAN) injection 4 mg    hydrOXYzine pamoate (VISTARIL) capsule 25 mg    melatonin tablet 3 mg    enoxaparin (LOVENOX) injection 40 mg    doxycycline (VIBRAMYCIN) capsule 100 mg    guaiFENesin ER (MUCINEX) tablet 1,200 mg    guaiFENesin-dextromethorphan (TUSSI-ORGANIDIN DM)  mg/5 mL oral solution 10 mL    prochlorperazine (COMPAZINE) with saline injection 10 mg    nystatin (MYCOSTATIN) 100,000 unit/mL oral suspension 500,000 Units      Patient PCP: Zoey Seaman MD  PMH:  has a past medical history of Arrhythmia, Arthritis, Asthma, Cancer ([address] (1996), Cancer ([address] (2001), Cancer ([address] (2013, 2014), COPD, Dyslipidemia (4/29/2014), GERD (gastroesophageal reflux disease), Hyperlipidemia, Irregular heart beat (1st-1983,early 2000's), Lung cancer ([address] (4/29/2014), Lung cancer, lower lobe ([address] (3/2013), Other ill-defined conditions(799.89), and Unspecified adverse effect of anesthesia. PSH:   has a past surgical history that includes pr thoracoscopy surg lobectomy (1996); hx bladder suspension (around 2004); hx appendectomy; hx hysterectomy (1985); hx other surgical (3/5/2013); hx other surgical (3/11/2013); hx cataract removal (Bilateral, 2013); hx breast lumpectomy (2001); hx urological; hx orthopaedic (Right, 1980's); and pr chest surgery procedure unlisted (1996, 2013, 2014). FHX: family history includes Cancer in her father, mother, sister, and another family member; No Known Problems in her brother, maternal grandfather, maternal grandmother, paternal grandfather, and paternal grandmother; Ovarian Cancer in an other family member. SHX:  reports that she quit smoking about 8 years ago. Her smoking use included cigarettes. She has a 60.00 pack-year smoking history. She has never used smokeless tobacco. She reports that she does not drink alcohol and does not use drugs.   Systemic review:  General states her weight stable no chronic fever chills or sweats  Eyes no double vision or momentary blindness  ENT no facial pain over the sinuses or drainage  Endocrinologic no polyuria polydipsia  Musculoskeletal no swollen tender joints no muscle aches or pains  Neurologic no seizures or syncope  Gastrointestinal no nausea vomiting acid indigestion does have a history of reflux states that as long as she takes her Prilosec she is asymptomatic  Genitourinary no pain or discomfort on urination no bleeding  Cardiovascular no history of heart disease chest pain diaphoresis she does have some chronic ankle edema states it is unchanged over several years  Respiratory does have onset hoarseness but just today states her voice was normal yesterday. Has had worsening cough shortness of breath and wheezing with this acute illness    Objective:     Vital Signs: Telemetry:    normal sinus rhythm Intake/Output:   Visit Vitals  /60 (BP 1 Location: Right upper arm, BP Patient Position: At rest)   Pulse 97   Temp 98 °F (36.7 °C)   Resp 18   Ht 5' 6\" (1.676 m)   Wt 79.4 kg (175 lb)   SpO2 96%   Breastfeeding No   BMI 28.25 kg/m²       Temp (24hrs), Av.4 °F (36.9 °C), Min:98 °F (36.7 °C), Max:98.8 °F (37.1 °C)        O2 Device: Nasal cannula O2 Flow Rate (L/min): 2 l/min       Wt Readings from Last 4 Encounters:   21 79.4 kg (175 lb)   16 81.6 kg (180 lb)   14 86.1 kg (189 lb 13.1 oz)   12/15/14 87.1 kg (192 lb)        No intake or output data in the 24 hours ending 11/15/21 1004    Last shift:      No intake/output data recorded. Last 3 shifts: No intake/output data recorded.        Physical Exam:   General:  female; lying in bed in no distress has hoarseness to her voice no accessory muscle use  HEENT: NCAT, normal oral mucosa  Eyes: anicteric; conjunctiva clear extraocular movements intact  Neck: no nodes, no JVD, no accessory MM use  Chest: no deformity,   Cardiac: Regular rate and rhythm no definite murmur has trace ankle edema  Lungs: Very poor air movement prolongation of exhalation mild but diffuse wheezing  Abd: Soft nontender normal bowel sounds  Ext: Trace edema; no joint swelling; No clubbing  : clear urine  Neuro: Awake alert oriented speech is clear moves all 4 extremities  Psych- no agitation, oriented to person;   Skin: warm, dry, no cyanosis;  Pulses: Brachial and radial pulses intact  Capillary: Normal capillary refill    Labs:    Recent Labs     11/13/21  1005   WBC 17.3*   HGB 11.9      INR 1.0   APTT 32.3     Recent Labs     11/13/21  1005      K 3.7      CO2 31   *   BUN 11   CREA 0.64   CA 9.2   LAC 1.1   ALB 3.3*   ALT 16     Troponin 30, 56, 53  Nasal influenza smear negative  BNP 1126  Procalcitonin 0.65  PET scan 3/13/2021 with FDG avid uptake right apex consistent with previous treatment no evidence of metastatic disease  Lab Results   Component Value Date/Time    Culture result: No growth 2 days 11/13/2021 10:05 AM    Culture result:  05/01/2014 02:42 PM     MRSA NOT PRESENT      Screening of patient nares for MRSA is for surveillance purposes and, if positive, to facilitate isolation considerations in high risk settings. It is not intended for automatic decolonization interventions per se as regimens are not sufficiently effective to warrant routine use. Lab Results   Component Value Date/Time    TSH 2.03 05/08/2014 07:43 PM       Imaging:    CXR Results  (Last 48 hours)               11/13/21 1014  XR CHEST SNGL V Final result    Impression:  Postsurgical findings as above. Narrative:  Chest one view. No comparison. Portable AP upright view at 1008 dated 11/13/2021. There has been a remote right thoracotomy. There is right hemithorax volume loss   and right apical pleural thickening/loculated pleural fluid. The heart is not enlarged. There is calcified plaque in the aorta. The left lung   is clear. There is no pneumothorax. There is a 6 mm linear radiodense structure overlying the left chest wall soft   tissues of uncertain significance.    To me there is mild elevation right hemidiaphragm with right apical density unchanged from the previous x-ray of 10/10/2020           Results from [place] encounter on 11/13/21    XR CHEST SNGL V    Narrative  Chest one view. No comparison. Portable AP upright view at 1008 dated 11/13/2021. There has been a remote right thoracotomy. There is right hemithorax volume loss  and right apical pleural thickening/loculated pleural fluid. The heart is not enlarged. There is calcified plaque in the aorta. The left lung  is clear. There is no pneumothorax. There is a 6 mm linear radiodense structure overlying the left chest wall soft  tissues of uncertain significance. Impression  Postsurgical findings as above. Results from [place] encounter on 12/10/20    XR SPINE THORAC 2 V    Narrative  2 view    Mild diffuse DDD. No fracture or bone destruction      Results from Hospital Encounter encounter on 12/19/14    XR CHEST PORT    Narrative  **Final Report**      ICD Codes / Adm. Diagnosis: 162.5   / LUNG CANCER  Examination:  CR CHEST PORT  - [id number] - Dec 19 2014 10:56AM  Accession No:  [id number]  Reason:  charlene cath placement      REPORT:  EXAM:  CR CHEST PORT    INDICATION:  charlene cath placement    COMPARISON:  6/4/14    FINDINGS: A portable AP radiograph of the chest was obtained at 1045 hours. The patient is on a cardiac monitor. The right subclavian Port-A-Cath has  its tip overlying the superior vena cava. Postoperative pleural thickening  at the right apex is noted. The heart size is normal. The lungs demonstrate a very shallow depth  inspiration. There is a suggestion of mild interstitial edema correlation  with fluid balance is suggested. Impression  :  1. Port-A-Cath in satisfactory position  2. No pneumothorax  3. Low lung volumes with possible mild interstitial edema.  The interstitial  edema like pattern may be more apparent than real due to the portable  technique and the shallow depth of inspiration. Signing/Reading Doctor: Brigido Chávez ([id number])  Approved: Brigido Chávez ([id number])  Dec 19 2014 11:07AM    Results from Hospital Encounter encounter on 11/13/21    CT CHEST WO CONT    Narrative  Axial images through the chest were obtained without contrast. Sagittal and  coronal reformatted images were reviewed. All CT scans at this facility are  performed using dose reduction optimization techniques as appropriate to a  performed exam including the following:   automated exposure control,  adjustments of the mA and/or kV according to patient size, or use of iterative  reconstruction technique. Comparison with report from PET CT March 13, 2021. INDICATION: History of lung cancer. There is a 1.5 cm low-density mass associated with the right thyroid lobe  superiorly. This was not hypermetabolic on previous PET/CT. Cb Katalina Postsurgical  changes are seen in the right lung with volume loss and pleural-parenchymal  changes in the right apex and medially suggesting post radiation therapy change,  essentially unchanged from the previous PET/CT. Numerous staple lines are seen. There is a 10 mm short axis lymph node adjacent to the proximal left mainstem  bronchus. Coronary artery calcifications and aortic valvular calcifications are  seen. There are new areas of patchy infiltrate in the periphery of the left  lung. No gross endobronchial lesions. No pleural or pericardial effusion. Bone windows and reconstructed images show old fracture of the proximal right  clavicle. Chronic calcification adjacent to the proximal right fifth rib. Old  right lateral rib fractures. There is a compression fracture of the superior  endplate of H65 unchanged from prior PET/CT. No retropulsion. Incidental imaging through the upper abdomen shows no significant additional  findings. Impression  1. Postsurgical changes in the right lung as described. 2. Borderline enlarged left paratracheal lymph node.   3. Minimal new areas of patchy airspace disease in the left lung. 4. Compression fracture of the superior endplate of O46 unchanged from prior  PET/CT      Discussion: Acute exacerbation COPD with leukocytosis may represent an acute bronchitis. She has been noted to have bronchiectasis in the right apex on past CTs. Density in the right apex probably unchanged from a previous x-ray of 10/2020 however cannot entirely rule out recurrent tumor. We will check a CT of the chest to see if any new abnormality seen on it. Her original tumor was an adenocarcinoma in 1996 with partial lobectomy. She subsequently had recurrent tumor in 2014 and there is a note that states she had a resection and a third resection near the end of 2014 for recurrent tumor with subsequent evidence of recurrence on PET scan and underwent chemoradiation therapy.   Hopefully she has nothing more than bronchitis with exacerbation COPD causing her current admission and hoarseness  Elvis Bolaños MD

## 2021-11-15 NOTE — PROGRESS NOTES
Hospitalist Progress Note    Subjective:   Daily Progress Note: 11/15/2021 11:08 AM    Hospital Course:  Gerber Salinas an 52-year-old female who comes to the hospital with one day complaints of dyspnea, associated cough, nausea/vomiting and diarrhea. 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 She had recurrent tumor in 2014 at which point she unerdwent resection and then had a third resection near the end of 2014 for recurrent tumor. Subsequent evidence of recurrence on PET scan and underwent chemoradiation therapy. On continuous oxygen at home, however presented with hypoxia requiring NC O2 at 4 L . Pulmonary consultation. Presenting CXR showing left lung clear , no pneumothorax, and right apical pleural thickening/loculated pleural fluid. CT chest showing minimal new areas of patchy airspace disease of left lung, otherwise no acute changed. Subjective:    Patient seen and examined at bedside. She continues to have some wheezing, difficulty expectorating sputum. Also reports rib pains while coughing.      Current Facility-Administered Medications   Medication Dose Route Frequency    piperacillin-tazobactam (ZOSYN) 3.375 g in 0.9% sodium chloride (MBP/ADV) 100 mL MBP  3.375 g IntraVENous Q8H    albuterol-ipratropium (DUO-NEB) 2.5 MG-0.5 MG/3 ML  3 mL Nebulization Q6HWA RT    methylPREDNISolone (PF) (Solu-MEDROL) injection 40 mg  40 mg IntraVENous Q6H    acetaminophen (TYLENOL) tablet 500 mg  500 mg Oral Q6H PRN    albuterol (PROVENTIL HFA, VENTOLIN HFA, PROAIR HFA) inhaler 2 Puff  2 Puff Inhalation Q6H PRN    pantoprazole (PROTONIX) tablet 40 mg  40 mg Oral DAILY    budesonide-formoteroL (SYMBICORT) 160-4.5 mcg/actuation HFA inhaler 2 Puff  2 Puff Inhalation BID    folic acid (FOLVITE) tablet 1 mg  1 mg Oral DAILY    metoprolol succinate (TOPROL-XL) XL tablet 50 mg  50 mg Oral DAILY    atorvastatin (LIPITOR) tablet 20 mg  20 mg Oral QHS    [facility]) injection 4 mg  4 mg IntraVENous Q6H PRN    hydrOXYzine pamoate (VISTARIL) capsule 25 mg  25 mg Oral TID PRN    melatonin tablet 3 mg  3 mg Oral QHS PRN    enoxaparin (LOVENOX) injection 40 mg  40 mg SubCUTAneous Q24H    guaiFENesin ER (MUCINEX) tablet 1,200 mg  1,200 mg Oral Q12H    guaiFENesin-dextromethorphan (TUSSI-ORGANIDIN DM)  mg/5 mL oral solution 10 mL  10 mL Oral Q4H PRN    prochlorperazine (COMPAZINE) with saline injection 10 mg  10 mg IntraVENous Q6H PRN    nystatin (MYCOSTATIN) 100,000 unit/mL oral suspension 500,000 Units  500,000 Units Oral QID        Review of Systems  Constitutional: Positive for malaise/fatigue. HENT: Positive for congestion. Respiratory: Positive for cough, sputum production, shortness of breath and wheezing. Musculoskeletal: Positive for back pain. Neurological: Positive for speech change and weakness. Objective:     Visit Vitals  /60 (BP 1 Location: Right upper arm, BP Patient Position: At rest)   Pulse 97   Temp 98 °F (36.7 °C)   Resp 18   Ht 5' 6\" (1.676 m)   Wt 79.4 kg (175 lb)   SpO2 96%   Breastfeeding No   BMI 28.25 kg/m²    O2 Flow Rate (L/min): 2 l/min O2 Device: Nasal cannula    Temp (24hrs), Av.4 °F (36.9 °C), Min:98 °F (36.7 °C), Max:98.8 °F (37.1 °C)      No intake/output data recorded. No intake/output data recorded. PHYSICAL EXAM:  Constitutional: Ill-appearing  female. No acute distress  Skin: Extremities and face reveal no rashes. HEENT: Sclerae anicteric. PERRL. The neck is supple and no masses. Cardiovascular: Regular rate and rhythm. +S1/S2. No murmur. Respiratory:  Symmetric chest wall expansion. Prolonged expiratory phase with faint wheezing. No rhonchi or rales. On baseline 2 L/min NC.   GI: Abdomen nondistended, soft, and nontender. Normal active bowel sounds. Rectal: Deferred   Musculoskeletal: No pitting edema of the lower legs. Able to move all ext  Neurological:  Generalized weakness. Patient is alert and oriented. Cranial nerves II-XII grossly intact  Psychiatric: Mood appears appropriate       Data Review    Recent Results (from the past 24 hour(s))   CBC WITH AUTOMATED DIFF    Collection Time: 11/15/21 10:15 AM   Result Value Ref Range    WBC 8.0 3.6 - 11.0 K/uL    RBC 3.48 (L) 3.80 - 5.20 M/uL    HGB 10.1 (L) 11.5 - 16.0 g/dL    HCT 34.5 (L) 35.0 - 47.0 %    MCV 99.1 (H) 80.0 - 99.0 FL    MCH 29.0 26.0 - 34.0 PG    MCHC 29.3 (L) 30.0 - 36.5 g/dL    RDW 13.4 11.5 - 14.5 %    PLATELET 524 357 - 867 K/uL    MPV 10.0 8.9 - 12.9 FL    NRBC 0.0 0.0  WBC    ABSOLUTE NRBC 0.00 0.00 - 0.01 K/uL    NEUTROPHILS 88 (H) 32 - 75 %    LYMPHOCYTES 9 (L) 12 - 49 %    MONOCYTES 2 (L) 5 - 13 %    EOSINOPHILS 0 0 - 7 %    BASOPHILS 0 0 - 1 %    IMMATURE GRANULOCYTES 1 (H) 0 - 0.5 %    ABS. NEUTROPHILS 7.0 1.8 - 8.0 K/UL    ABS. LYMPHOCYTES 0.7 (L) 0.8 - 3.5 K/UL    ABS. MONOCYTES 0.2 0.0 - 1.0 K/UL    ABS. EOSINOPHILS 0.0 0.0 - 0.4 K/UL    ABS. BASOPHILS 0.0 0.0 - 0.1 K/UL    ABS. IMM. GRANS. 0.1 (H) 0.00 - 0.04 K/UL    DF AUTOMATED     METABOLIC PANEL, BASIC    Collection Time: 11/15/21 10:15 AM   Result Value Ref Range    Sodium 141 136 - 145 mmol/L    Potassium 4.0 3.5 - 5.1 mmol/L    Chloride 102 97 - 108 mmol/L    CO2 35 (H) 21 - 32 mmol/L    Anion gap 4 (L) 5 - 15 mmol/L    Glucose 157 (H) 65 - 100 mg/dL    BUN 18 6 - 20 mg/dL    Creatinine 0.54 (L) 0.55 - 1.02 mg/dL    BUN/Creatinine ratio 33 (H) 12 - 20      GFR est AA >60 >60 ml/min/1.73m2    GFR est non-AA >60 >60 ml/min/1.73m2    Calcium 8.9 8.5 - 10.1 mg/dL       CT CHEST WO CONT   Final Result   1. Postsurgical changes in the right lung as described. 2. Borderline enlarged left paratracheal lymph node. 3. Minimal new areas of patchy airspace disease in the left lung. 4. Compression fracture of the superior endplate of G95 unchanged from prior   PET/CT      XR CHEST SNGL V   Final Result   Postsurgical findings as above.           Active Problems:    Sepsis ([facility] (11/13/2021)      [facility] (11[facility]13[facility])        Assessment/Plan:     1. Acute on chronic respiratory failure with hypoxia-   - Weaned to baseline 2 L nasal cannula  - Continue duo nebs prn, doxycycline x 5 days  - Pulmonary consult  - Continue Symbicort  - Continue IV Solu-Medrol  - Consider ENT evaluation of vocal cords if hoarseness does not improve     2. Hypertension  - Tachycardia- on IV metoprolol  - Cardiology consulted     3. Hx of RLL adenocarcinoma  - Had right thoracotomy in past, not on chemotherapy or radiation at present time, follow with oncology as outpatient     4. Elevated troponin-  - mild rise, repeat x 1, EKG without ischemia  - 2D echo pending  - Cardiology following      DVT Prophylaxis: Lovenox  Code Status: Full  POA:    Discharge Barriers: Pending respiratory improvement. Ambulatory pulse ox tomorrow morning. Care Plan discussed with: patient and nursing. Spoke to daughter, Michael Waldrop, over phone and gave update. Total time spent with patient: >35 minutes.

## 2021-11-16 ENCOUNTER — APPOINTMENT (OUTPATIENT)
Dept: NON INVASIVE DIAGNOSTICS | Age: 81
DRG: 871 | End: 2021-11-16
Attending: NURSE PRACTITIONER
Payer: MEDICARE

## 2021-11-16 VITALS
HEART RATE: 92 BPM | WEIGHT: 175 LBS | SYSTOLIC BLOOD PRESSURE: 135 MMHG | OXYGEN SATURATION: 96 % | RESPIRATION RATE: 18 BRPM | BODY MASS INDEX: 28.12 KG/M2 | TEMPERATURE: 98.4 F | HEIGHT: 66 IN | DIASTOLIC BLOOD PRESSURE: 73 MMHG

## 2021-11-16 PROBLEM — J40 BRONCHITIS: Status: ACTIVE | Noted: 2021-11-16

## 2021-11-16 LAB
ATRIAL RATE: 100 BPM
BNP SERPL-MCNC: 2589 PG/ML
CALCULATED P AXIS, ECG09: 70 DEGREES
CALCULATED R AXIS, ECG10: 63 DEGREES
CALCULATED T AXIS, ECG11: 51 DEGREES
DIAGNOSIS, 93000: NORMAL
MAGNESIUM SERPL-MCNC: 2.3 MG/DL (ref 1.6–2.4)
P-R INTERVAL, ECG05: 188 MS
Q-T INTERVAL, ECG07: 348 MS
QRS DURATION, ECG06: 86 MS
QTC CALCULATION (BEZET), ECG08: 448 MS
TROPONIN-HIGH SENSITIVITY: 13 NG/L (ref 0–51)
TROPONIN-HIGH SENSITIVITY: 14 NG/L (ref 0–51)
VENTRICULAR RATE, ECG03: 100 BPM

## 2021-11-16 PROCEDURE — 36415 COLL VENOUS BLD VENIPUNCTURE: CPT

## 2021-11-16 PROCEDURE — 74011250636 HC RX REV CODE- 250/636: Performed by: INTERNAL MEDICINE

## 2021-11-16 PROCEDURE — 77010033678 HC OXYGEN DAILY

## 2021-11-16 PROCEDURE — 93005 ELECTROCARDIOGRAM TRACING: CPT

## 2021-11-16 PROCEDURE — 74011250637 HC RX REV CODE- 250/637: Performed by: NURSE PRACTITIONER

## 2021-11-16 PROCEDURE — 94640 AIRWAY INHALATION TREATMENT: CPT

## 2021-11-16 PROCEDURE — 74011636637 HC RX REV CODE- 636/637: Performed by: INTERNAL MEDICINE

## 2021-11-16 PROCEDURE — 83735 ASSAY OF MAGNESIUM: CPT

## 2021-11-16 PROCEDURE — 74011000250 HC RX REV CODE- 250: Performed by: INTERNAL MEDICINE

## 2021-11-16 PROCEDURE — 74011000258 HC RX REV CODE- 258: Performed by: INTERNAL MEDICINE

## 2021-11-16 PROCEDURE — 84484 ASSAY OF TROPONIN QUANT: CPT

## 2021-11-16 PROCEDURE — 94760 N-INVAS EAR/PLS OXIMETRY 1: CPT

## 2021-11-16 PROCEDURE — 83880 ASSAY OF NATRIURETIC PEPTIDE: CPT

## 2021-11-16 RX ORDER — PREDNISONE 10 MG/1
TABLET ORAL
Qty: 7 TABLET | Refills: 0 | Status: SHIPPED | OUTPATIENT
Start: 2021-11-16 | End: 2021-11-21

## 2021-11-16 RX ORDER — PREDNISONE 20 MG/1
20 TABLET ORAL 2 TIMES DAILY WITH MEALS
Status: DISCONTINUED | OUTPATIENT
Start: 2021-11-16 | End: 2021-11-16 | Stop reason: HOSPADM

## 2021-11-16 RX ORDER — LEVOFLOXACIN 750 MG/1
750 TABLET ORAL DAILY
Qty: 3 TABLET | Refills: 0 | Status: SHIPPED | OUTPATIENT
Start: 2021-11-17 | End: 2021-11-20

## 2021-11-16 RX ORDER — IPRATROPIUM BROMIDE AND ALBUTEROL SULFATE 2.5; .5 MG/3ML; MG/3ML
3 SOLUTION RESPIRATORY (INHALATION)
Qty: 30 NEBULE | Refills: 1 | Status: SHIPPED | OUTPATIENT
Start: 2021-11-16

## 2021-11-16 RX ADMIN — GUAIFENESIN 1200 MG: 600 TABLET ORAL at 09:57

## 2021-11-16 RX ADMIN — METOPROLOL SUCCINATE 50 MG: 50 TABLET, EXTENDED RELEASE ORAL at 09:57

## 2021-11-16 RX ADMIN — FOLIC ACID 1 MG: 1 TABLET ORAL at 09:57

## 2021-11-16 RX ADMIN — BUDESONIDE AND FORMOTEROL FUMARATE DIHYDRATE 2 PUFF: 160; 4.5 AEROSOL RESPIRATORY (INHALATION) at 09:11

## 2021-11-16 RX ADMIN — NYSTATIN 500000 UNITS: 100000 SUSPENSION ORAL at 09:57

## 2021-11-16 RX ADMIN — IPRATROPIUM BROMIDE AND ALBUTEROL SULFATE 3 ML: .5; 2.5 SOLUTION RESPIRATORY (INHALATION) at 09:11

## 2021-11-16 RX ADMIN — PIPERACILLIN AND TAZOBACTAM 3.38 G: 3; .375 INJECTION, POWDER, LYOPHILIZED, FOR SOLUTION INTRAVENOUS at 10:49

## 2021-11-16 RX ADMIN — PANTOPRAZOLE SODIUM 40 MG: 40 TABLET, DELAYED RELEASE ORAL at 09:57

## 2021-11-16 RX ADMIN — PIPERACILLIN AND TAZOBACTAM 3.38 G: 3; .375 INJECTION, POWDER, LYOPHILIZED, FOR SOLUTION INTRAVENOUS at 03:35

## 2021-11-16 RX ADMIN — PREDNISONE 20 MG: 20 TABLET ORAL at 10:49

## 2021-11-16 RX ADMIN — METHYLPREDNISOLONE SODIUM SUCCINATE 40 MG: 125 INJECTION, POWDER, FOR SOLUTION INTRAMUSCULAR; INTRAVENOUS at 05:56

## 2021-11-16 NOTE — PROGRESS NOTES
Discharge plan of care/case management plan validated with provider discharge order. Pt d/c with daughter via w/c and home O2. Denies questions/concerns regarding d/c.

## 2021-11-16 NOTE — PROGRESS NOTES
Progress Note    Patient: Mireya Orozco MRN: [id number]  SSN: [id number]    YOB: 1940  Age: 78 y.o. Sex: female      Admit Date: 11/13/2021    LOS: 3 days     Subjective:     No acute events overnight  Objective:     Vitals:    11/16/21 0155 11/16/21 0330 11/16/21 0339 11/16/21 0716   BP:   108/65 135/73   Pulse: (!) 140 79 82 92   Resp:   17 18   Temp:   98.2 °F (36.8 °C) 98.4 °F (36.9 °C)   SpO2:   97% 92%   Weight:       Height:            Intake and Output:  Current Shift: No intake/output data recorded. Last three shifts: 11/14 1901 - 11/16 0700  In: 300 [I.V.:300]  Out: -     Physical Exam:   General:  Alert, cooperative, no distress, appears stated age. Eyes:  Conjunctivae/corneas clear. PERRL, EOMs intact. Fundi benign   Ears:  Normal TMs and external ear canals both ears. Nose: Nares normal. Septum midline. Mucosa normal. No drainage or sinus tenderness. Mouth/Throat: Lips, mucosa, and tongue normal. Teeth and gums normal.   Neck: Supple, symmetrical, trachea midline, no adenopathy, thyroid: no enlargment/tenderness/nodules, no carotid bruit and no JVD. Back:   Symmetric, no curvature. ROM normal. No CVA tenderness. Lungs:    Clear to auscultation bilaterally. Heart:  Regular rate and rhythm, S1, S2 normal, no murmur, click, rub or gallop. Abdomen:   Soft, non-tender. Bowel sounds normal. No masses,  No organomegaly. Extremities: Extremities normal, atraumatic, no cyanosis or edema. Pulses: 2+ and symmetric all extremities. Skin: Skin color, texture, turgor normal. No rashes or lesions   Lymph nodes: Cervical, supraclavicular, and axillary nodes normal.   Neurologic: CNII-XII intact. Normal strength, sensation and reflexes throughout. Lab/Data Review: All lab results for the last 24 hours reviewed.          Assessment:     Active Problems:    Sepsis ([address] (11/13/2021)      Acute and chronic respiratory failure with hypoxia ([address] (11/13/2021)      Patient is an 78-year-old white female with:  1. Acute on chronic hypoxemic respiratory failure  2. Hypertension  3. SVT  4. Mild rise of troponin  5. GERD  6. Ex-smoker  7. History of lung adenocarcinoma status post right thoracotomy. Plan:     Echocardiogram pending. Breathing sounds has improved. No wheezes on today's examination. She continued to have bursts of sinus tachycardia or SVT. Anyhow at the time of examination she is back in sinus rhythm with heart rate of less than 100 bpm.  We will have PT see patient. Currently on atorvastatin and metoprolol. CT chest yesterday showed compression fracture of the superior endplate of X39 stable compared to previous study. Minimal new areas of patchy airspace disease in the left lung. Borderline enlarged left paratracheal lymph node. She has been seen by pulmonary. From cardiac standpoint she can be discharged and I will be happy to see her in the office in 2 weeks or sooner if needed. If echo is not done while she is in house we will plan for that as an outpatient.   Signed By: Kuldeep West MD     November 16, 2021

## 2021-11-16 NOTE — DISCHARGE INSTRUCTIONS
Patient Education        Using a Metered-Dose Inhaler: Care Instructions  Overview     A metered-dose inhaler lets you breathe medicine into your lungs quickly. Inhaled medicine works faster than the same medicine in a pill. An inhaler allows you to take less medicine than you would need if you took it as a pill. \"Metered-dose\" means that the inhaler gives a measured amount of medicine each time you use it. A metered-dose inhaler gives medicine in the form of a liquid mist.  Your doctor may want you to use a spacer with your inhaler. A spacer is a chamber that you attach to the inhaler. The chamber holds the medicine before you inhale it. That way, you can inhale the medicine in as many breaths as you need. Doctors recommend using a spacer with most metered-dose inhalers. This is even more important when using corticosteroid medicines. Follow-up care is a key part of your treatment and safety. Be sure to make and go to all appointments, and call your doctor if you are having problems. It's also a good idea to know your test results and keep a list of the medicines you take. How can you care for yourself at home? To get started  · Talk with your health care provider to be sure you are using your inhaler the right way. It might help if you practice using it in front of a mirror. Use the inhaler exactly as prescribed. · Check that you have the correct medicine. If you use more than one inhaler, put a label on each one. This will let you know which one to use at the right time. · Keep track of how much medicine is in the inhaler. Check the label to see how many doses are in the container. If you know how many puffs you can take, you can replace the inhaler before you run out. Ask your health care provider how you can keep track of how much medicine is left. · Talk to your health care provider about using a spacer with your inhaler. Spacers make it easier to get the medicine into your lungs.  You may need a spacer if you are using corticosteroid medicines. A spacer can also help if you have problems pressing the inhaler and breathing in at the same time. · If you are using a corticosteroid inhaler, gargle and rinse out your mouth with water after use. Do not swallow the water. Swallowing the water will increase the chance that the medicine will get into your bloodstream. This may make it more likely that you will have side effects. To use a spacer with an inhaler  1. Shake the inhaler. Remove the inhaler cap, and place the mouthpiece of the inhaler into the spacer. Check the inhaler instructions to see if you need to prime your inhaler before you use it. If it needs priming, follow the instructions on how to prime your inhaler. 2. Remove the cap from the spacer. 3. Hold the inhaler upright with the mouthpiece at the bottom. 4. Tilt your head back a little, and breathe out slowly and completely. 5. Place the spacer's mouthpiece in your mouth. 6. Press down on the inhaler to spray one puff of medicine into the spacer, and then start breathing in slowly. Wait to inhale until after you have pressed down on the inhaler. Some spacers have a whistle. If you hear it, you should breathe in more slowly. 7. Hold your breath for 10 seconds. This will let the medicine settle in your lungs. 8. If you need to take a second dose, wait 30 to 60 seconds to allow the inhaler valve to refill. To use an inhaler without a spacer  1. Shake the inhaler as directed. Remove the cap. Check the instructions to see if you need to prime your inhaler before you use it. If it needs priming, follow the instructions on how to prime your inhaler. 2. Hold the inhaler upright with the mouthpiece at the bottom. 3. Tilt your head back a little, and breathe out slowly and completely. 4. Position the inhaler in one of two ways:  ? You can place the inhaler in your mouth. This is easier for most people.  And it lowers the risk that any of the medicine will get into your eyes. ? Or you can place the inhaler 1 to 2 inches in front of your open mouth, without closing your lips over it. Try to open your mouth as wide as you can. Placing the inhaler in front of your open mouth may be better for getting the medicine into your lungs. But some people may find this too hard to do. 5. Start taking slow, even breaths through your mouth. Press down on the inhaler once, then inhale fully. 6. Hold your breath for 10 seconds. This will let the medicine settle in your lungs. 7. If you need to take a second dose, wait 30 to 60 seconds to allow the inhaler valve to refill. Where can you learn more? Go to http://www.herrera.com/  Enter K111 in the search box to learn more about \"Using a Metered-Dose Inhaler: Care Instructions. \"  Current as of: July 6, 2021               Content Version: 13.0  © 2006-2021 [employer]. Care instructions adapted under license by [employer] (which disclaims liability or warranty for this information). If you have questions about a medical condition or this instruction, always ask your healthcare professional. [place] [address] any warranty or liability for your use of this information.

## 2021-11-16 NOTE — PROGRESS NOTES
Pulmonary, Note    Name: Qamar Powell MRN: [id number]   : 1940 Hospital: [address] [place] [address]   Date: 2021  Admission date: 2021 Hospital Day: 4       Subjective/Interval History:   Patient seen on the medical floor. She was admitted to the hospital with worsening shortness of breath and cough. She states the cough and shortness of breath gradually worsened over the last 3 or 4 days. Very little sputum brought up in with what is brought up is white. She is noted no fever chills or sweats. She has become hoarse today but did not have that prior to today. She does have a history of lung cancer adenocarcinoma removed in . She had recurrence in  and underwent a second resection. There may have been a third resection at the end of  and at that point she started chemo therapy and radiation therapy. Her last PET scan from 2021 showed FDG avid uptake in the right apex consistent with prior treatment no evidence of metastatic disease    Hospital Problems  Date Reviewed: 2021          Codes Class Noted POA    Bronchitis ICD-10-CM: J40  ICD-9-CM: 753  2021 Unknown        Sepsis ([email or web address] ICD-10-CM: A41.9  ICD-9-CM: 038.9, 995.91  2021 Unknown        * (Principal) Acute and chronic respiratory failure with hypoxia ([email or web address] ICD-10-CM: J96.21  ICD-9-CM: 518.84, 799.02  2021 Unknown              IMPRESSION:   1. Acute hypoxic respiratory failure  2. Chronic hypoxic respiratory failure normally on 2 L oxygen  3. Acute exacerbation COPD  4. History lung cancer last resection treatment   5. Hoarseness likely due to cough and acute bronchitis   Body mass index is 28.25 kg/m². RECOMMENDATIONS/PLAN:   1.  History of lung cancer first diagnosed in  at [facility] right upper lobe tumor underwent lobectomy received radiation treatment then again 2013 diagnosed with second lung cancer right lower lobe superior segment was adenocarcinoma patient received radiation treatment no chemotherapy at that time and again in 2014 patient had a 1.6 cm nodule Dr. Irene Mauro had done bronchoscopy VATS and superior segmentectomy and she was followed by oncologist because of recurrent metastatic non-small cell lung cancer currently on observation follow-up   2. She is on noninvasive ventilator trilogy to use it at night and daytime oxygen nasal cannula 2 L  3. We will add nebulized albuterol Atrovent every 6 hours while awake  4. Left lower lobe infiltrate pneumonia possible aspiration on doxycycline will discontinue start patient on Zosyn  5. On Solu-Medrol nebulizer treatment discontinue Solu-Medrol start patient on prednisone  6. Continue Symbicort to replace her Advair  7. CT chest shows postsurgical changes in the right lung  8. Borderline Enlarged left paratracheal lymph node  9. Compression fracture of the superior endplate of B75 which was present before          [x] High complexity decision making was performed  [x] See my orders for details      Subjective/Initial History:     I was asked by Cali Piña MD to see Peter Reno  a 78 y.o.    female in consultation for a chief complaint of shortness of breath cough wheezing      Allergies   Allergen Reactions    Contrast Agent [Iodine] Rash    Adhesive Tape-Silicones Itching and Contact Dermatitis        MAR reviewed and pertinent medications noted or modified as needed     Current Facility-Administered Medications   Medication    piperacillin-tazobactam (ZOSYN) 3.375 g in 0.9% sodium chloride (MBP/ADV) 100 mL MBP    cyclobenzaprine (FLEXERIL) tablet 10 mg    albuterol-ipratropium (DUO-NEB) 2.5 MG-0.5 MG/3 ML    methylPREDNISolone (PF) (Solu-MEDROL) injection 40 mg    acetaminophen (TYLENOL) tablet 500 mg    albuterol (PROVENTIL HFA, VENTOLIN HFA, PROAIR HFA) inhaler 2 Puff    pantoprazole (PROTONIX) tablet 40 mg    budesonide-formoteroL (SYMBICORT) 160-4.5 mcg/actuation HFA inhaler 2 Puff    folic acid (FOLVITE) tablet 1 mg    metoprolol succinate (TOPROL-XL) XL tablet 50 mg    atorvastatin (LIPITOR) tablet 20 mg    ondansetron (ZOFRAN) injection 4 mg    hydrOXYzine pamoate (VISTARIL) capsule 25 mg    melatonin tablet 3 mg    enoxaparin (LOVENOX) injection 40 mg    guaiFENesin ER (MUCINEX) tablet 1,200 mg    guaiFENesin-dextromethorphan (TUSSI-ORGANIDIN DM)  mg/5 mL oral solution 10 mL    prochlorperazine (COMPAZINE) with saline injection 10 mg    nystatin (MYCOSTATIN) 100,000 unit/mL oral suspension 500,000 Units      Patient PCP: Georgian Nageotte, MD  PMH:  has a past medical history of Arrhythmia, Arthritis, Asthma, Cancer ([address] (1996), Cancer ([address] (2001), Cancer ([address] (2013, 2014), COPD, Dyslipidemia (4/29/2014), GERD (gastroesophageal reflux disease), Hyperlipidemia, Irregular heart beat (1st-1983,early 2000's), Lung cancer ([address] (4/29/2014), Lung cancer, lower lobe ([address] (3/2013), Other ill-defined conditions(799.89), and Unspecified adverse effect of anesthesia. PSH:   has a past surgical history that includes pr thoracoscopy surg lobectomy (1996); hx bladder suspension (around 2004); hx appendectomy; hx hysterectomy (1985); hx other surgical (3/5/2013); hx other surgical (3/11/2013); hx cataract removal (Bilateral, 2013); hx breast lumpectomy (2001); hx urological; hx orthopaedic (Right, 1980's); and pr chest surgery procedure unlisted (1996, 2013, 2014). FHX: family history includes Cancer in her father, mother, sister, and another family member; No Known Problems in her brother, maternal grandfather, maternal grandmother, paternal grandfather, and paternal grandmother; Ovarian Cancer in an other family member. SHX:  reports that she quit smoking about 8 years ago. Her smoking use included cigarettes. She has a 60.00 pack-year smoking history.  She has never used smokeless tobacco. She reports that she does not drink alcohol and does not use drugs. Systemic review:  General states her weight stable no chronic fever chills or sweats  Eyes no double vision or momentary blindness  ENT no facial pain over the sinuses or drainage  Endocrinologic no polyuria polydipsia  Musculoskeletal no swollen tender joints no muscle aches or pains  Neurologic no seizures or syncope  Gastrointestinal no nausea vomiting acid indigestion does have a history of reflux states that as long as she takes her Prilosec she is asymptomatic  Genitourinary no pain or discomfort on urination no bleeding  Cardiovascular no history of heart disease chest pain diaphoresis she does have some chronic ankle edema states it is unchanged over several years  Respiratory does have onset hoarseness but just today states her voice was normal yesterday. Has had worsening cough shortness of breath and wheezing with this acute illness    Objective:     Vital Signs: Telemetry:    normal sinus rhythm Intake/Output:   Visit Vitals  /73 (BP 1 Location: Right arm, BP Patient Position: Supine)   Pulse 92   Temp 98.4 °F (36.9 °C)   Resp 18   Ht 5' 6\" (1.676 m)   Wt 79.4 kg (175 lb)   SpO2 96%   Breastfeeding No   BMI 28.25 kg/m²       Temp (24hrs), Av.5 °F (36.9 °C), Min:98.2 °F (36.8 °C), Max:98.8 °F (37.1 °C)        O2 Device: Nasal cannula O2 Flow Rate (L/min): 2 l/min       Wt Readings from Last 4 Encounters:   21 79.4 kg (175 lb)   16 81.6 kg (180 lb)   14 86.1 kg (189 lb 13.1 oz)   12/15/14 87.1 kg (192 lb)          Intake/Output Summary (Last 24 hours) at 2021 0949  Last data filed at 2021 0339  Gross per 24 hour   Intake 300 ml   Output --   Net 300 ml       Last shift:      No intake/output data recorded.   Last 3 shifts:  1901 -  0700  In: 300 [I.V.:300]  Out: -        Physical Exam:   General:  female; lying in bed in no distress has hoarseness to her voice no accessory muscle use  HEENT: NCAT, normal oral mucosa  Eyes: anicteric; conjunctiva clear extraocular movements intact  Neck: no nodes, no JVD, no accessory MM use  Chest: no deformity,   Cardiac: Regular rate and rhythm no definite murmur has trace ankle edema  Lungs: Very poor air movement prolongation of exhalation mild but diffuse wheezing  Abd: Soft nontender normal bowel sounds  Ext: Trace edema; no joint swelling; No clubbing  : clear urine  Neuro: Awake alert oriented speech is clear moves all 4 extremities  Psych- no agitation, oriented to person;   Skin: warm, dry, no cyanosis;  Pulses: Brachial and radial pulses intact  Capillary: Normal capillary refill    Labs:    Recent Labs     11/15/21  1015 11/13/21  1005   WBC 8.0 17.3*   HGB 10.1* 11.9    247   INR  --  1.0   APTT  --  32.3     Recent Labs     11/15/21  1015 11/13/21  1005    138   K 4.0 3.7    101   CO2 35* 31   * 124*   BUN 18 11   CREA 0.54* 0.64   CA 8.9 9.2   LAC  --  1.1   ALB  --  3.3*   ALT  --  16     Troponin 30, 56, 53  Nasal influenza smear negative  BNP 1126  Procalcitonin 0.65  PET scan 3/13/2021 with FDG avid uptake right apex consistent with previous treatment no evidence of metastatic disease  Lab Results   Component Value Date/Time    Culture result: No growth 3 days 11/13/2021 10:05 AM    Culture result:  05/01/2014 02:42 PM     MRSA NOT PRESENT      Screening of patient nares for MRSA is for surveillance purposes and, if positive, to facilitate isolation considerations in high risk settings. It is not intended for automatic decolonization interventions per se as regimens are not sufficiently effective to warrant routine use. Lab Results   Component Value Date/Time    TSH 2.03 05/08/2014 07:43 PM       Imaging:    CXR Results  (Last 48 hours)               11/13/21 1014  XR CHEST SNGL V Final result    Impression:  Postsurgical findings as above. Narrative:  Chest one view. No comparison. Portable AP upright view at 1008 dated 11/13/2021. There has been a remote right thoracotomy. There is right hemithorax volume loss   and right apical pleural thickening/loculated pleural fluid. The heart is not enlarged. There is calcified plaque in the aorta. The left lung   is clear. There is no pneumothorax. There is a 6 mm linear radiodense structure overlying the left chest wall soft   tissues of uncertain significance. To me there is mild elevation right hemidiaphragm with right apical density unchanged from the previous x-ray of 10/10/2020           Results from Hospital Encounter encounter on 11/13/21    XR CHEST SNGL V    Narrative  Chest one view. No comparison. Portable AP upright view at 1008 dated 11/13/2021. There has been a remote right thoracotomy. There is right hemithorax volume loss  and right apical pleural thickening/loculated pleural fluid. The heart is not enlarged. There is calcified plaque in the aorta. The left lung  is clear. There is no pneumothorax. There is a 6 mm linear radiodense structure overlying the left chest wall soft  tissues of uncertain significance. Impression  Postsurgical findings as above. Results from [place] encounter on 12/10/20    XR SPINE THORAC 2 V    Narrative  2 view    Mild diffuse DDD. No fracture or bone destruction      Results from Hospital Encounter encounter on 12/19/14    XR CHEST PORT    Narrative  **Final Report**      ICD Codes / Adm. Diagnosis: 162.5   / LUNG CANCER  Examination:  CR CHEST PORT  - [id number] - Dec 19 2014 10:56AM  Accession No:  [id number]  Reason:  charlene cath placement      REPORT:  EXAM:  CR CHEST PORT    INDICATION:  charlene cath placement    COMPARISON:  6/4/14    FINDINGS: A portable AP radiograph of the chest was obtained at 1045 hours. The patient is on a cardiac monitor. The right subclavian Port-A-Cath has  its tip overlying the superior vena cava. Postoperative pleural thickening  at the right apex is noted.     The heart size is normal. The lungs demonstrate a very shallow depth  inspiration. There is a suggestion of mild interstitial edema correlation  with fluid balance is suggested. Impression  :  1. Port-A-Cath in satisfactory position  2. No pneumothorax  3. Low lung volumes with possible mild interstitial edema. The interstitial  edema like pattern may be more apparent than real due to the portable  technique and the shallow depth of inspiration. Signing/Reading Doctor: Jaylin Hughes ([id number])  Approved: Jaylin Hughes ([id number])  Dec 19 2014 11:07AM    Results from Hospital Encounter encounter on 11/13/21    CT CHEST WO CONT    Narrative  Axial images through the chest were obtained without contrast. Sagittal and  coronal reformatted images were reviewed. All CT scans at this facility are  performed using dose reduction optimization techniques as appropriate to a  performed exam including the following:   automated exposure control,  adjustments of the mA and/or kV according to patient size, or use of iterative  reconstruction technique. Comparison with report from PET CT March 13, 2021. INDICATION: History of lung cancer. There is a 1.5 cm low-density mass associated with the right thyroid lobe  superiorly. This was not hypermetabolic on previous PET/CT. Cherl Spinner Postsurgical  changes are seen in the right lung with volume loss and pleural-parenchymal  changes in the right apex and medially suggesting post radiation therapy change,  essentially unchanged from the previous PET/CT. Numerous staple lines are seen. There is a 10 mm short axis lymph node adjacent to the proximal left mainstem  bronchus. Coronary artery calcifications and aortic valvular calcifications are  seen. There are new areas of patchy infiltrate in the periphery of the left  lung. No gross endobronchial lesions. No pleural or pericardial effusion. Bone windows and reconstructed images show old fracture of the proximal right  clavicle.  Chronic calcification adjacent to the proximal right fifth rib. Old  right lateral rib fractures. There is a compression fracture of the superior  endplate of X65 unchanged from prior PET/CT. No retropulsion. Incidental imaging through the upper abdomen shows no significant additional  findings. Impression  1. Postsurgical changes in the right lung as described. 2. Borderline enlarged left paratracheal lymph node. 3. Minimal new areas of patchy airspace disease in the left lung. 4. Compression fracture of the superior endplate of N00 unchanged from prior  PET/CT      Discussion: Acute exacerbation COPD with leukocytosis may represent an acute bronchitis. She has been noted to have bronchiectasis in the right apex on past CTs. Density in the right apex probably unchanged from a previous x-ray of 10/2020 however cannot entirely rule out recurrent tumor. We will check a CT of the chest to see if any new abnormality seen on it. Her original tumor was an adenocarcinoma in 1996 with partial lobectomy. She subsequently had recurrent tumor in 2014 and there is a note that states she had a resection and a third resection near the end of 2014 for recurrent tumor with subsequent evidence of recurrence on PET scan and underwent chemoradiation therapy.   Hopefully she has nothing more than bronchitis with exacerbation COPD causing her current admission and hoarseness  Cam Jones MD

## 2021-11-16 NOTE — DISCHARGE SUMMARY
Hospitalist Discharge Summary     Patient ID:    Taylor Whitaker  [id number]  78 y.o.  1940    Admit date: 11/13/2021    Discharge date : 11/16/2021    Chronic Diagnoses:    Problem List as of 11/16/2021 Date Reviewed: 11/13/2021          Codes Class Noted - Resolved    Bronchitis ICD-10-CM: J40  ICD-9-CM: 267  11/16/2021 - Present        Sepsis ([facility] ICD-10-CM: A41.9  ICD-9-CM: 038.9, 995.91  11/13/2021 - Present        * (Principal) Acute and chronic respiratory failure with hypoxia ([facility] ICD-10-CM: J96.21  ICD-9-CM: 518.84, 799.02  11/13/2021 - Present        Lung cancer ([facility] ICD-10-CM: C34.90  ICD-9-CM: 162.9  4/29/2014 - Present        Dyslipidemia ICD-10-CM: E78.5  ICD-9-CM: 272.4  4/29/2014 - Present          22    Final Diagnoses:   Principal Problem:    Acute and chronic respiratory failure with hypoxia ([facility] (11/13/2021)    Active Problems:    Sepsis ([facility] (11/13/2021)      Bronchitis (11/16/2021)      Hospital Course:   Alexandra Riley an 39-year-old female who comes to the hospital with one day complaints of dyspnea, associated cough, nausea/vomiting and diarrhea. 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 She had recurrent tumor in 2014 at which point she unerdwent resection and then had a third resection near the end of 2014 for recurrent tumor. Subsequent evidence of recurrence on PET scan and underwent chemoradiation therapy. On continuous oxygen at home, however presented with hypoxia requiring NC O2 at 4 L . Pulmonary consultation. Presenting CXR showing left lung clear , no pneumothorax, and right apical pleural thickening/loculated pleural fluid. CT chest showing minimal new areas of patchy airspace disease of left lung, otherwise no acute changed. Weaned to baseline 2 L nasal cannula. Family declining home health services at this time. Stable for discharge with close outpatient pulmonary follow-up.     Discharge Medications:   Current Discharge Medication List      START taking these medications    Details   albuterol-ipratropium (DUO-NEB) 2.5 mg-0.5 mg/3 ml nebu 3 mL by Nebulization route every four (4) hours as needed for Wheezing. Qty: 30 Nebule, Refills: 1  Start date: 11/16/2021      levoFLOXacin (Levaquin) 750 mg tablet Take 1 Tablet by mouth daily for 3 days. Qty: 3 Tablet, Refills: 0  Start date: 11/17/2021, End date: 11/20/2021      predniSONE (DELTASONE) 10 mg tablet Take 20 mg by mouth daily for 2 days, THEN 10 mg daily for 3 days. Qty: 7 Tablet, Refills: 0  Start date: 11/16/2021, End date: 11/21/2021         CONTINUE these medications which have NOT CHANGED    Details   rosuvastatin (Crestor) 10 mg tablet Take 10 mg by mouth nightly. iron polysaccharides (FERREX 150) 150 mg iron capsule Take 150 mg by mouth Daily (before breakfast). folic acid (FOLVITE) 1 mg tablet Take  by mouth Daily (before breakfast). metoprolol succinate (TOPROL-XL) 50 mg XL tablet Take 1 Tab by mouth daily. Qty: 60 Tab, Refills: 1      fluticasone-salmeterol (ADVAIR DISKUS) 250-50 mcg/dose diskus inhaler Take 1 Puff by inhalation every twelve (12) hours. albuterol (VENTOLIN HFA) 90 mcg/Actuation inhaler Take 2 Puffs by inhalation every six (6) hours as needed. esomeprazole (NEXIUM) 40 mg capsule Take 40 mg by mouth Daily (before breakfast). oxyCODONE-acetaminophen (PERCOCET 10)  mg per tablet Take 1 tablet by mouth. diphenhydrAMINE (BENADRYL) 25 mg tablet Take 25 mg by mouth every six (6) hours as needed (taken during allergy season). acetaminophen (TYLENOL) 500 mg tablet Take 500 mg by mouth every six (6) hours as needed. simvastatin (ZOCOR) 20 mg tablet Take 20 mg by mouth nightly. Follow up Care:    1. Rosie Tracey MD in 1-2 weeks.       Follow-up Information     Follow up With Specialties Details Why Amanda Rodriguez MD Internal Medicine Schedule an appointment as soon as possible for a visit in 2 weeks  [address]  [place] [address] [facility]      Moriah Bella MD Pulmonary Disease Schedule an appointment as soon as possible for a visit in 2 weeks Hospital follow-up for bronchitis, hypoxia. [address] [facility]  [phone]              Patient Follow Up Instructions: Activity: Activity as tolerated  Diet:  Regular Diet  Wound care: None required    Condition at Discharge:  Stable  __________________________________________________________________    Disposition  Home or Self Care  ____________________________________________________________________    Code Status:  Full Code  ___________________________________________________________________    Discharge Exam:  Patient seen and examined by me on discharge day. Pertinent Findings:    Gen:    Not in distress  Chest: Clear lungs. On baseline 2 L/min NC.  CVS:   Regular rate and rhythm. No edema  Abd:  Soft, not distended, not tender  Neuro:  Alert and oriented x3    CONSULTATIONS: Pulmonary/Intensive care    Significant Diagnostic Studies:   Recent Results (from the past 24 hour(s))   CBC WITH AUTOMATED DIFF    Collection Time: 11/15/21 10:15 AM   Result Value Ref Range    WBC 8.0 3.6 - 11.0 K/uL    RBC 3.48 (L) 3.80 - 5.20 M/uL    HGB 10.1 (L) 11.5 - 16.0 g/dL    HCT 34.5 (L) 35.0 - 47.0 %    MCV 99.1 (H) 80.0 - 99.0 FL    MCH 29.0 26.0 - 34.0 PG    MCHC 29.3 (L) 30.0 - 36.5 g/dL    RDW 13.4 11.5 - 14.5 %    PLATELET 924 793 - 906 K/uL    MPV 10.0 8.9 - 12.9 FL    NRBC 0.0 0.0  WBC    ABSOLUTE NRBC 0.00 0.00 - 0.01 K/uL    NEUTROPHILS 88 (H) 32 - 75 %    LYMPHOCYTES 9 (L) 12 - 49 %    MONOCYTES 2 (L) 5 - 13 %    EOSINOPHILS 0 0 - 7 %    BASOPHILS 0 0 - 1 %    IMMATURE GRANULOCYTES 1 (H) 0 - 0.5 %    ABS. NEUTROPHILS 7.0 1.8 - 8.0 K/UL    ABS. LYMPHOCYTES 0.7 (L) 0.8 - 3.5 K/UL    ABS. MONOCYTES 0.2 0.0 - 1.0 K/UL    ABS. EOSINOPHILS 0.0 0.0 - 0.4 K/UL    ABS. BASOPHILS 0.0 0.0 - 0.1 K/UL    ABS. IMM. GRANS. 0.1 (H) 0.00 - 0.04 K/UL    DF AUTOMATED     METABOLIC PANEL, BASIC    Collection Time: 11/15/21 10:15 AM   Result Value Ref Range    Sodium 141 136 - 145 mmol/L    Potassium 4.0 3.5 - 5.1 mmol/L    Chloride 102 97 - 108 mmol/L    CO2 35 (H) 21 - 32 mmol/L    Anion gap 4 (L) 5 - 15 mmol/L    Glucose 157 (H) 65 - 100 mg/dL    BUN 18 6 - 20 mg/dL    Creatinine 0.54 (L) 0.55 - 1.02 mg/dL    BUN/Creatinine ratio 33 (H) 12 - 20      GFR est AA >60 >60 ml/min/1.73m2    GFR est non-AA >60 >60 ml/min/1.73m2    Calcium 8.9 8.5 - 10.1 mg/dL   TROPONIN-HIGH SENSITIVITY    Collection Time: 11/16/21  2:10 AM   Result Value Ref Range    Troponin-High Sensitivity 13 0 - 51 ng/L     CT CHEST WO CONT   Final Result   1. Postsurgical changes in the right lung as described. 2. Borderline enlarged left paratracheal lymph node. 3. Minimal new areas of patchy airspace disease in the left lung. 4. Compression fracture of the superior endplate of C87 unchanged from prior   PET/CT      XR CHEST SNGL V   Final Result   Postsurgical findings as above.               Signed:  Jyoti Mitchell PA-C  11/16/2021  9:50 AM

## 2021-11-16 NOTE — PROGRESS NOTES
Problem: Gas Exchange - Impaired  Goal: *Absence of hypoxia  Outcome: Progressing Towards Goal  Note: Patient is on 2L NC which is her baseline. Patient O2 saturations are remaining above 90%. Bedside shift change report given to JOVON Wilkinson (oncoming nurse) by JOVON Martinez (offgoing nurse). Report included the following information SBAR, Kardex, ED Summary, Intake/Output, MAR, Accordion and Cardiac Rhythm NSR/Sinus Tach/Sinus Arrhythmia.

## 2021-11-16 NOTE — PROGRESS NOTES
Around 9pm tele called. Patient tachy at 140. Checked on Patient and patient was asympotmatic,      2315  Tele called pt now in the 150s. RN went and checked on patient and took vitals. Patient states that this happens often and that sometmes is last just a few minutes and sometimes it last longer. Patient still asymptonmatic and reports that this happens and she doesn't want this to prolong her stay    0030-Spoke with Dr. Catracho Godinez. A stat EKG and troponins were ordered. EKG showed NSR w/PAC's.     0345---Critical troponin called from Lab. Dr. Catracho Godinez once again paged-Cardio consult and morning labs ordered.      Patient is resting comfortably and her heart rate has normalized

## 2021-11-19 LAB
BACTERIA SPEC CULT: NORMAL
SPECIAL REQUESTS,SREQ: NORMAL

## 2021-12-02 ENCOUNTER — TRANSCRIBE ORDER (OUTPATIENT)
Dept: SCHEDULING | Age: 81
End: 2021-12-02

## 2021-12-02 DIAGNOSIS — J44.9 COPD (CHRONIC OBSTRUCTIVE PULMONARY DISEASE) (HCC): Primary | ICD-10-CM

## 2021-12-07 ENCOUNTER — HOSPITAL ENCOUNTER (OUTPATIENT)
Dept: NON INVASIVE DIAGNOSTICS | Age: 81
Discharge: HOME | End: 2021-12-07
Attending: INTERNAL MEDICINE
Payer: MEDICARE

## 2021-12-07 DIAGNOSIS — J44.9 COPD (CHRONIC OBSTRUCTIVE PULMONARY DISEASE) (HCC): ICD-10-CM

## 2021-12-07 LAB
ECHO AO ASC DIAM: 2.5 CM
ECHO AO ROOT DIAM: 2.9 CM
ECHO AV MEAN GRADIENT: 2 MMHG
ECHO AV MEAN VELOCITY: 73.7 CM/S
ECHO AV PEAK GRADIENT: 5 MMHG
ECHO AV PEAK VELOCITY: 113 CM/S
ECHO AV VTI: 24.4 CM
ECHO EST RA PRESSURE: 3 MMHG
ECHO LA AREA 2C: 10.6 CM2
ECHO LA AREA 4C: 7.98 CM2
ECHO LA MAJOR AXIS: 2.8 CM
ECHO LV E' SEPTAL VELOCITY: 7.31 CM/S
ECHO LV EDV A2C: 66.9 CM3
ECHO LV EDV A4C: 55 CM3
ECHO LV ESV A2C: 34.3 CM3
ECHO LV ESV A4C: 12 CM3
ECHO LV INTERNAL DIMENSION DIASTOLIC: 4.06 CM (ref 3.9–5.3)
ECHO LV INTERNAL DIMENSION SYSTOLIC: 3.25 CM
ECHO LV IVSD: 0.87 CM (ref 0.6–0.9)
ECHO LV MASS 2D: 116.7 G (ref 67–162)
ECHO LV POSTERIOR WALL DIASTOLIC: 0.98 CM (ref 0.6–0.9)
ECHO LVOT PEAK GRADIENT: 4 MMHG
ECHO LVOT PEAK VELOCITY: 101 CM/S
ECHO LVOT VTI: 20.2 CM
ECHO MV A VELOCITY: 96.8 CM/S
ECHO MV AREA PHT: 3.01 CM2
ECHO MV E VELOCITY: 86.9 CM/S
ECHO MV E/A RATIO: 0.9
ECHO MV E/E' SEPTAL: 11.89
ECHO MV PRESSURE HALF TIME (PHT): 73 MS
ECHO PV MAX VELOCITY: 116 CM/S
ECHO PV MEAN GRADIENT: 2 MMHG
ECHO PV PEAK INSTANTANEOUS GRADIENT SYSTOLIC: 5 MMHG
ECHO PV VTI: 21.7 CM
ECHO PVEIN PEAK D VELOCITY: 57.5 CM/S
ECHO PVEIN PEAK S VELOCITY: 43.1 CM/S
ECHO RA AREA 4C: 12.6 CM2
ECHO RIGHT VENTRICULAR SYSTOLIC PRESSURE (RVSP): 20 MMHG
ECHO TV MAX VELOCITY: 204 CM/S
ECHO TV REGURGITANT PEAK GRADIENT: 17 MMHG
LA VOL DISK BP: 15 CM3 (ref 22–52)
LVOT MG: 2 MMHG
MV DEC SLOPE: 3550 MM/S2
MV DEC SLOPE: 3550 MM/S2

## 2021-12-07 PROCEDURE — 93306 TTE W/DOPPLER COMPLETE: CPT

## 2022-03-18 PROBLEM — A41.9 SEPSIS (HCC): Status: ACTIVE | Noted: 2021-11-13

## 2022-03-19 PROBLEM — J96.21 ACUTE AND CHRONIC RESPIRATORY FAILURE WITH HYPOXIA (HCC): Status: ACTIVE | Noted: 2021-11-13

## 2022-03-19 PROBLEM — J40 BRONCHITIS: Status: ACTIVE | Noted: 2021-11-16

## 2022-06-06 ENCOUNTER — TRANSCRIBE ORDER (OUTPATIENT)
Dept: SCHEDULING | Age: 82
End: 2022-06-06

## 2022-06-06 DIAGNOSIS — C34.32 PRIMARY MALIGNANT NEOPLASM OF BRONCHUS OF LEFT LOWER LOBE (HCC): Primary | ICD-10-CM

## 2022-09-17 ENCOUNTER — HOSPITAL ENCOUNTER (OUTPATIENT)
Dept: PET IMAGING | Age: 82
Discharge: HOME | End: 2022-09-17
Attending: INTERNAL MEDICINE
Payer: MEDICARE

## 2022-09-17 DIAGNOSIS — C34.32 PRIMARY MALIGNANT NEOPLASM OF BRONCHUS OF LEFT LOWER LOBE (HCC): ICD-10-CM

## 2022-09-17 PROCEDURE — A9552 F18 FDG: HCPCS

## 2022-09-17 RX ADMIN — FLUDEOXYGLUCOSE F-18 9.89 MILLICURIE: 200 INJECTION INTRAVENOUS at 08:35

## 2022-09-19 RX ORDER — FLUDEOXYGLUCOSE F-18 200 MCI/ML
9.89 INJECTION INTRAVENOUS ONCE
Status: COMPLETED | OUTPATIENT
Start: 2022-09-19 | End: 2022-09-17

## 2022-12-13 ENCOUNTER — TRANSCRIBE ORDER (OUTPATIENT)
Dept: SCHEDULING | Age: 82
End: 2022-12-13

## 2022-12-13 DIAGNOSIS — M54.16 LUMBAR RADICULOPATHY: Primary | ICD-10-CM

## 2022-12-16 ENCOUNTER — HOSPITAL ENCOUNTER (OUTPATIENT)
Dept: MRI IMAGING | Age: 82
End: 2022-12-16
Attending: PHYSICAL MEDICINE & REHABILITATION
Payer: MEDICARE

## 2022-12-16 DIAGNOSIS — M54.16 LUMBAR RADICULOPATHY: ICD-10-CM

## 2022-12-16 PROCEDURE — 72148 MRI LUMBAR SPINE W/O DYE: CPT

## 2022-12-22 ENCOUNTER — TRANSCRIBE ORDER (OUTPATIENT)
Dept: SCHEDULING | Age: 82
End: 2022-12-22

## 2022-12-22 DIAGNOSIS — C34.32 PRIMARY MALIGNANT NEOPLASM OF BRONCHUS OF LEFT LOWER LOBE (HCC): Primary | ICD-10-CM

## 2022-12-30 ENCOUNTER — HOSPITAL ENCOUNTER (INPATIENT)
Age: 82
LOS: 19 days | Discharge: NURSING, REHAB OR LONG-TERM CARE | DRG: 055 | End: 2023-01-18
Attending: EMERGENCY MEDICINE | Admitting: HOSPITALIST
Payer: MEDICARE

## 2022-12-30 ENCOUNTER — APPOINTMENT (OUTPATIENT)
Dept: CT IMAGING | Age: 82
DRG: 055 | End: 2022-12-30
Attending: HOSPITALIST
Payer: MEDICARE

## 2022-12-30 ENCOUNTER — APPOINTMENT (OUTPATIENT)
Dept: RADIATION THERAPY | Age: 82
DRG: 055 | End: 2022-12-30
Payer: MEDICARE

## 2022-12-30 DIAGNOSIS — M54.9 ACUTE BACK PAIN, UNSPECIFIED BACK LOCATION, UNSPECIFIED BACK PAIN LATERALITY: Primary | ICD-10-CM

## 2022-12-30 DIAGNOSIS — R26.9 GAIT DISTURBANCE: ICD-10-CM

## 2022-12-30 DIAGNOSIS — C79.51 MALIGNANT NEOPLASM METASTATIC TO BONE (HCC): ICD-10-CM

## 2022-12-30 PROBLEM — R29.898 LOWER EXTREMITY WEAKNESS: Status: ACTIVE | Noted: 2022-12-30

## 2022-12-30 LAB
ANION GAP SERPL CALC-SCNC: 7 MMOL/L (ref 5–15)
BASOPHILS # BLD: 0 K/UL (ref 0–0.1)
BASOPHILS NFR BLD: 0 % (ref 0–1)
BUN SERPL-MCNC: 14 MG/DL (ref 6–20)
BUN/CREAT SERPL: 26 (ref 12–20)
CA-I BLD-MCNC: 9.6 MG/DL (ref 8.5–10.1)
CHLORIDE SERPL-SCNC: 103 MMOL/L (ref 97–108)
CO2 SERPL-SCNC: 33 MMOL/L (ref 21–32)
CREAT SERPL-MCNC: 0.54 MG/DL (ref 0.55–1.02)
DIFFERENTIAL METHOD BLD: ABNORMAL
EOSINOPHIL # BLD: 0.3 K/UL (ref 0–0.4)
EOSINOPHIL NFR BLD: 5 % (ref 0–7)
ERYTHROCYTE [DISTWIDTH] IN BLOOD BY AUTOMATED COUNT: 14 % (ref 11.5–14.5)
GLUCOSE SERPL-MCNC: 90 MG/DL (ref 65–100)
HCT VFR BLD AUTO: 35.8 % (ref 35–47)
HGB BLD-MCNC: 10.6 G/DL (ref 11.5–16)
IMM GRANULOCYTES # BLD AUTO: 0 K/UL (ref 0–0.04)
IMM GRANULOCYTES NFR BLD AUTO: 0 % (ref 0–0.5)
LYMPHOCYTES # BLD: 1.8 K/UL (ref 0.8–3.5)
LYMPHOCYTES NFR BLD: 30 % (ref 12–49)
MCH RBC QN AUTO: 30.1 PG (ref 26–34)
MCHC RBC AUTO-ENTMCNC: 29.6 G/DL (ref 30–36.5)
MCV RBC AUTO: 101.7 FL (ref 80–99)
MONOCYTES # BLD: 0.6 K/UL (ref 0–1)
MONOCYTES NFR BLD: 10 % (ref 5–13)
NEUTS SEG # BLD: 3.3 K/UL (ref 1.8–8)
NEUTS SEG NFR BLD: 55 % (ref 32–75)
NRBC # BLD: 0 K/UL (ref 0–0.01)
NRBC BLD-RTO: 0 PER 100 WBC
PLATELET # BLD AUTO: 244 K/UL (ref 150–400)
PMV BLD AUTO: 8.9 FL (ref 8.9–12.9)
POTASSIUM SERPL-SCNC: 4.2 MMOL/L (ref 3.5–5.1)
RBC # BLD AUTO: 3.52 M/UL (ref 3.8–5.2)
SODIUM SERPL-SCNC: 143 MMOL/L (ref 136–145)
WBC # BLD AUTO: 6 K/UL (ref 3.6–11)

## 2022-12-30 PROCEDURE — 74011000258 HC RX REV CODE- 258: Performed by: HOSPITALIST

## 2022-12-30 PROCEDURE — 74011250636 HC RX REV CODE- 250/636: Performed by: NURSE PRACTITIONER

## 2022-12-30 PROCEDURE — 72131 CT LUMBAR SPINE W/O DYE: CPT

## 2022-12-30 PROCEDURE — 99285 EMERGENCY DEPT VISIT HI MDM: CPT

## 2022-12-30 PROCEDURE — 72128 CT CHEST SPINE W/O DYE: CPT

## 2022-12-30 PROCEDURE — 94640 AIRWAY INHALATION TREATMENT: CPT

## 2022-12-30 PROCEDURE — 74011000250 HC RX REV CODE- 250: Performed by: HOSPITALIST

## 2022-12-30 PROCEDURE — 80048 BASIC METABOLIC PNL TOTAL CA: CPT

## 2022-12-30 PROCEDURE — 65270000029 HC RM PRIVATE

## 2022-12-30 PROCEDURE — 74011250637 HC RX REV CODE- 250/637: Performed by: NURSE PRACTITIONER

## 2022-12-30 PROCEDURE — 36415 COLL VENOUS BLD VENIPUNCTURE: CPT

## 2022-12-30 PROCEDURE — 74011250636 HC RX REV CODE- 250/636: Performed by: HOSPITALIST

## 2022-12-30 PROCEDURE — 85025 COMPLETE CBC W/AUTO DIFF WBC: CPT

## 2022-12-30 PROCEDURE — 74011250637 HC RX REV CODE- 250/637: Performed by: HOSPITALIST

## 2022-12-30 RX ORDER — ONDANSETRON 2 MG/ML
4 INJECTION INTRAMUSCULAR; INTRAVENOUS
Status: DISCONTINUED | OUTPATIENT
Start: 2022-12-30 | End: 2023-01-18 | Stop reason: HOSPADM

## 2022-12-30 RX ORDER — ALBUTEROL SULFATE 90 UG/1
2 AEROSOL, METERED RESPIRATORY (INHALATION)
Status: DISCONTINUED | OUTPATIENT
Start: 2022-12-30 | End: 2023-01-18 | Stop reason: HOSPADM

## 2022-12-30 RX ORDER — BUDESONIDE AND FORMOTEROL FUMARATE DIHYDRATE 160; 4.5 UG/1; UG/1
2 AEROSOL RESPIRATORY (INHALATION)
Status: DISCONTINUED | OUTPATIENT
Start: 2022-12-30 | End: 2022-12-30

## 2022-12-30 RX ORDER — PANTOPRAZOLE SODIUM 40 MG/1
40 TABLET, DELAYED RELEASE ORAL
Status: DISCONTINUED | OUTPATIENT
Start: 2022-12-31 | End: 2023-01-18 | Stop reason: HOSPADM

## 2022-12-30 RX ORDER — ACETAMINOPHEN 500 MG
1000 TABLET ORAL ONCE
Status: COMPLETED | OUTPATIENT
Start: 2022-12-30 | End: 2022-12-30

## 2022-12-30 RX ORDER — IPRATROPIUM BROMIDE AND ALBUTEROL SULFATE 2.5; .5 MG/3ML; MG/3ML
3 SOLUTION RESPIRATORY (INHALATION) 4 TIMES DAILY
Status: DISCONTINUED | OUTPATIENT
Start: 2022-12-30 | End: 2022-12-31

## 2022-12-30 RX ORDER — DEXTROSE MONOHYDRATE AND SODIUM CHLORIDE 5; .9 G/100ML; G/100ML
125 INJECTION, SOLUTION INTRAVENOUS CONTINUOUS
Status: DISPENSED | OUTPATIENT
Start: 2022-12-30 | End: 2022-12-31

## 2022-12-30 RX ORDER — OMEPRAZOLE 20 MG/1
40 CAPSULE, DELAYED RELEASE ORAL
COMMUNITY
Start: 2022-10-25

## 2022-12-30 RX ORDER — TRIMETHOPRIM 100 MG/1
100 TABLET ORAL
COMMUNITY

## 2022-12-30 RX ORDER — OXYCODONE HCL 20 MG/1
20 TABLET, FILM COATED, EXTENDED RELEASE ORAL EVERY 12 HOURS
Status: DISCONTINUED | OUTPATIENT
Start: 2022-12-30 | End: 2023-01-07

## 2022-12-30 RX ORDER — OXYCODONE HYDROCHLORIDE 10 MG/1
10 TABLET ORAL 3 TIMES DAILY
COMMUNITY
Start: 2022-12-06

## 2022-12-30 RX ORDER — FOLIC ACID 1 MG/1
1 TABLET ORAL
Status: DISCONTINUED | OUTPATIENT
Start: 2022-12-31 | End: 2023-01-18 | Stop reason: HOSPADM

## 2022-12-30 RX ORDER — ONDANSETRON 4 MG/1
4 TABLET, ORALLY DISINTEGRATING ORAL
Status: DISCONTINUED | OUTPATIENT
Start: 2022-12-30 | End: 2023-01-18 | Stop reason: HOSPADM

## 2022-12-30 RX ORDER — IPRATROPIUM BROMIDE AND ALBUTEROL SULFATE 2.5; .5 MG/3ML; MG/3ML
3 SOLUTION RESPIRATORY (INHALATION) 4 TIMES DAILY
Status: DISCONTINUED | OUTPATIENT
Start: 2022-12-30 | End: 2022-12-30

## 2022-12-30 RX ORDER — DEXAMETHASONE SODIUM PHOSPHATE 4 MG/ML
10 INJECTION, SOLUTION INTRA-ARTICULAR; INTRALESIONAL; INTRAMUSCULAR; INTRAVENOUS; SOFT TISSUE ONCE
Status: ACTIVE | OUTPATIENT
Start: 2022-12-30 | End: 2022-12-31

## 2022-12-30 RX ORDER — GABAPENTIN 400 MG/1
400 CAPSULE ORAL 3 TIMES DAILY
Status: DISCONTINUED | OUTPATIENT
Start: 2022-12-30 | End: 2023-01-18 | Stop reason: HOSPADM

## 2022-12-30 RX ORDER — SODIUM CHLORIDE 0.9 % (FLUSH) 0.9 %
5-40 SYRINGE (ML) INJECTION EVERY 8 HOURS
Status: DISCONTINUED | OUTPATIENT
Start: 2022-12-30 | End: 2023-01-18 | Stop reason: HOSPADM

## 2022-12-30 RX ORDER — SODIUM CHLORIDE 0.9 % (FLUSH) 0.9 %
5-40 SYRINGE (ML) INJECTION AS NEEDED
Status: DISCONTINUED | OUTPATIENT
Start: 2022-12-30 | End: 2023-01-18 | Stop reason: HOSPADM

## 2022-12-30 RX ORDER — DEXAMETHASONE SODIUM PHOSPHATE 4 MG/ML
8 INJECTION, SOLUTION INTRA-ARTICULAR; INTRALESIONAL; INTRAMUSCULAR; INTRAVENOUS; SOFT TISSUE EVERY 8 HOURS
Status: DISCONTINUED | OUTPATIENT
Start: 2022-12-31 | End: 2023-01-18 | Stop reason: HOSPADM

## 2022-12-30 RX ORDER — HYDROMORPHONE HYDROCHLORIDE 1 MG/ML
1 INJECTION, SOLUTION INTRAMUSCULAR; INTRAVENOUS; SUBCUTANEOUS ONCE
Status: DISCONTINUED | OUTPATIENT
Start: 2022-12-30 | End: 2022-12-30

## 2022-12-30 RX ORDER — IPRATROPIUM BROMIDE AND ALBUTEROL SULFATE 2.5; .5 MG/3ML; MG/3ML
3 SOLUTION RESPIRATORY (INHALATION)
COMMUNITY

## 2022-12-30 RX ORDER — GABAPENTIN 300 MG/1
300 CAPSULE ORAL 3 TIMES DAILY
Status: ON HOLD | COMMUNITY
Start: 2022-11-04 | End: 2023-01-18 | Stop reason: SDUPTHER

## 2022-12-30 RX ORDER — FLUTICASONE FUROATE, UMECLIDINIUM BROMIDE AND VILANTEROL TRIFENATATE 200; 62.5; 25 UG/1; UG/1; UG/1
1 POWDER RESPIRATORY (INHALATION) DAILY
COMMUNITY
Start: 2022-10-04

## 2022-12-30 RX ORDER — ACETAMINOPHEN 325 MG/1
650 TABLET ORAL
Status: DISCONTINUED | OUTPATIENT
Start: 2022-12-30 | End: 2023-01-18 | Stop reason: HOSPADM

## 2022-12-30 RX ORDER — ENOXAPARIN SODIUM 100 MG/ML
40 INJECTION SUBCUTANEOUS DAILY
Status: DISCONTINUED | OUTPATIENT
Start: 2022-12-31 | End: 2023-01-05

## 2022-12-30 RX ORDER — ACETAMINOPHEN 650 MG/1
650 SUPPOSITORY RECTAL
Status: DISCONTINUED | OUTPATIENT
Start: 2022-12-30 | End: 2023-01-18 | Stop reason: HOSPADM

## 2022-12-30 RX ORDER — MORPHINE SULFATE 2 MG/ML
2 INJECTION, SOLUTION INTRAMUSCULAR; INTRAVENOUS
Status: DISCONTINUED | OUTPATIENT
Start: 2022-12-30 | End: 2023-01-07

## 2022-12-30 RX ORDER — DEXAMETHASONE SODIUM PHOSPHATE 10 MG/ML
10 INJECTION INTRAMUSCULAR; INTRAVENOUS ONCE
Status: COMPLETED | OUTPATIENT
Start: 2022-12-30 | End: 2022-12-30

## 2022-12-30 RX ADMIN — DEXTROSE AND SODIUM CHLORIDE 125 ML/HR: 5; 900 INJECTION, SOLUTION INTRAVENOUS at 15:57

## 2022-12-30 RX ADMIN — SODIUM CHLORIDE, PRESERVATIVE FREE 10 ML: 5 INJECTION INTRAVENOUS at 16:54

## 2022-12-30 RX ADMIN — SODIUM CHLORIDE, PRESERVATIVE FREE 10 ML: 5 INJECTION INTRAVENOUS at 21:26

## 2022-12-30 RX ADMIN — OXYCODONE HYDROCHLORIDE 20 MG: 20 TABLET, FILM COATED, EXTENDED RELEASE ORAL at 20:41

## 2022-12-30 RX ADMIN — GABAPENTIN 400 MG: 400 CAPSULE ORAL at 21:26

## 2022-12-30 RX ADMIN — GABAPENTIN 400 MG: 400 CAPSULE ORAL at 16:47

## 2022-12-30 RX ADMIN — ACETAMINOPHEN 1000 MG: 500 TABLET ORAL at 14:11

## 2022-12-30 RX ADMIN — SODIUM CHLORIDE 500 ML: 9 INJECTION, SOLUTION INTRAVENOUS at 14:10

## 2022-12-30 RX ADMIN — MORPHINE SULFATE 2 MG: 2 INJECTION, SOLUTION INTRAMUSCULAR; INTRAVENOUS at 16:51

## 2022-12-30 RX ADMIN — MORPHINE SULFATE 2 MG: 2 INJECTION, SOLUTION INTRAMUSCULAR; INTRAVENOUS at 21:26

## 2022-12-30 RX ADMIN — IPRATROPIUM BROMIDE AND ALBUTEROL SULFATE 3 ML: .5; 2.5 SOLUTION RESPIRATORY (INHALATION) at 20:11

## 2022-12-30 RX ADMIN — DEXAMETHASONE SODIUM PHOSPHATE 10 MG: 10 INJECTION, SOLUTION INTRAMUSCULAR; INTRAVENOUS at 14:11

## 2022-12-30 RX ADMIN — ACETAMINOPHEN 650 MG: 325 TABLET ORAL at 19:30

## 2022-12-30 NOTE — CONSULTS
Radiation Oncology Consult    Patient: Сергей Limon MRN: [id number]  SSN: [id number]    YOB: 1940  Age: 78 y.o. Sex: female      Subjective:      Сергей Limon is a 78 y.o. female with a 26-year history of non-small cell carcinoma of the right lung, with multiple recurrences, who now has increasing back pain and lower extremity weakness, referred by Dr. Markus Paige for evaluation for palliative radiotherapy. Ms. Iman Hightower underwent a PET scan back in September which showed stability in the treated right lung with no new areas of suspicious activity. 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 Despite multiple pain medications, her discomfort continued to escalate until she had difficulty ambulating this morning. She was transported to the emergency room for further management.       Past Medical History:   Diagnosis Date    Arrhythmia     \"fast heart rate\" at times, takes Toprol to control    Arthritis     Asthma     Cancer ([address] 1996    RUL lung    Cancer ([address] 2001    left breast    Cancer ([address] 2013, 2014    right lung    COPD     Dyslipidemia 4/29/2014    GERD (gastroesophageal reflux disease)     Hyperlipidemia     Irregular heart beat 1st-1983,early 2000's    SVT per patient-none since starting metoprolol early 2000's    Lung cancer ([address] 4/29/2014    Lung cancer, lower lobe ([address] 3/2013    T3Nx adenocarcinoma RLL    Other ill-defined conditions(799.89)     urinary urgency    Unspecified adverse effect of anesthesia     severe itching after lung surgery     Past Surgical History:   Procedure Laterality Date    HX APPENDECTOMY      HX BLADDER SUSPENSION  around 2004    HX BREAST LUMPECTOMY  2001    left w/nodes removed    HX CATARACT REMOVAL Bilateral 2013    HX HYSTERECTOMY  1985    ANTON    HX ORTHOPAEDIC Right     bunion removal    HX OTHER SURGICAL  3/5/2013    RLL superior segmentectomy    HX OTHER SURGICAL  3/11/2013    Resection of RLL margins    HX UROLOGICAL      Bladder tuck    FL CHEST SURGERY PROCEDURE UNLISTED  , ,     right lung (see \"other\")    FL THORACOSCOPY SURG LOBECTOMY      RUL lung cancer      Social History     Tobacco Use    Smoking status: Former     Packs/day: 1.00     Years: 60.00     Pack years: 60.00     Types: Cigarettes     Quit date: 3/4/2013     Years since quittin.8    Smokeless tobacco: Never   Substance Use Topics    Alcohol use: No     Family History   Problem Relation Age of Onset    Cancer Mother         colon    Cancer Father         prostate    Cancer Sister         breast    Cancer Other         breast    Ovarian Cancer Other     No Known Problems Brother     No Known Problems Maternal Grandmother     No Known Problems Maternal Grandfather     No Known Problems Paternal Grandmother     No Known Problems Paternal Grandfather      Prior to Admission medications    Medication Sig Start Date End Date Taking? Authorizing Provider   albuterol-ipratropium (DUO-NEB) 2.5 mg-0.5 mg/3 ml nebu 3 mL by Nebulization route four (4) times daily. Yes Provider, Historical   oxyCODONE IR (ROXICODONE) 10 mg tab immediate release tablet Take 10 mg by mouth three (3) times daily. 22   Provider, Historical   gabapentin (NEURONTIN) 300 mg capsule Take 400 mg by mouth three (3) times daily. 22   Provider, Historical   omeprazole (PRILOSEC) 20 mg capsule Take 40 mg by mouth Daily (before breakfast). 10/25/22   Provider, Historical   Trelegy Ellipta 200-62.5-25 mcg inhaler Take 1 Puff by inhalation daily. 10/4/22   Provider, Historical   rosuvastatin (Crestor) 10 mg tablet Take 10 mg by mouth nightly. Provider, Historical   iron polysaccharides (FERREX 150) 150 mg iron capsule Take 150 mg by mouth Daily (before breakfast).     Provider, Historical   folic acid (Bandar Mane) 1 mg tablet Take  by mouth Daily (before breakfast). Provider, Historical   metoprolol succinate (TOPROL-XL) 50 mg XL tablet Take 1 Tab by mouth daily. 5/12/14   Cheryle Salm, MD   albuterol (PROVENTIL HFA, VENTOLIN HFA, PROAIR HFA) 90 mcg/actuation inhaler Take 2 Puffs by inhalation every six (6) hours as needed for Wheezing or Shortness of Breath. Provider, Historical        Allergies   Allergen Reactions    Contrast Agent [Iodine] Rash    Adhesive Tape-Silicones Itching and Contact Dermatitis       Review of Systems:  Constitutional: No fevers, No chills, No fatigue  Eyes: No visual disturbance  Ears, Nose, Mouth, Throat, and Face: No nasal congestion, No sore throat  Respiratory: No cough, No sputum, No wheezing, No SOB  Cardiovascular: No chest pain, No lower extremity edema, No Palpitations   Gastrointestinal: No nausea, No vomiting, No diarrhea, No constipation, No abdominal pain  Genitourinary: No frequency, No dysuria, No hematuria  Integument/Breast: No rash, No skin lesion(s), No dryness  Musculoskeletal: No arthralgias, No neck pain, No back pain  Neurological: No headaches, No dizziness, No confusion,  No seizures. Lower extremity weakness. Behavioral/Psychiatric: No anxiety, No depression      Objective:     Vitals:    12/30/22 1208 12/30/22 1535 12/30/22 1639   BP: (!) 91/53 100/81 (!) 143/60   Pulse: 74 69 85   Resp: 18 18 17   Temp: 98.3 °F (36.8 °C)  98 °F (36.7 °C)   SpO2: 98% 96% 96%   Weight: 81.6 kg (180 lb)     Height: 5' 6\" (1.676 m)          Physical Exam:  General: She is a charming white female with considerable lower back pain, seen in the emergency department. Eye: EOMI/PERRL. Throat and Neck: There is no palpable cervical adenopathy present. Lung: Clear to auscultation bilaterally  Heart: Regular rate and rhythm   Abdomen: Soft, non-tender. Bowel sounds normal. No masses  Extremities:  Able to move all extremities. Skin: Normal.  Neurologic: AOx3.  Cranial nerves 2-12 and sensation grossly intact. She has both dull and sharp sensation in the lower extremities bilaterally. She has good strength on dorsiflexion and extension. She is able to lift both legs off of the mattress but with difficulty secondary to pain. Psychiatric: Non focal    CBC:   Lab Results   Component Value Date/Time    WBC 6.0 12/30/2022 01:39 PM    RBC 3.52 (L) 12/30/2022 01:39 PM    HGB 10.6 (L) 12/30/2022 01:39 PM    HCT 35.8 12/30/2022 01:39 PM    PLATELET 322 60/65/9655 01:39 PM     BMP:   Lab Results   Component Value Date/Time    Glucose 90 12/30/2022 01:39 PM    Sodium 143 12/30/2022 01:39 PM    Potassium 4.2 12/30/2022 01:39 PM    Chloride 103 12/30/2022 01:39 PM    CO2 33 (H) 12/30/2022 01:39 PM    BUN 14 12/30/2022 01:39 PM    Creatinine 0.54 (L) 12/30/2022 01:39 PM    Calcium 9.6 12/30/2022 01:39 PM     Radiology review: Current studies reviewed. Assessment:   Elderly white female with a long history of non-small cell carcinoma of the lung with new disease in the lumbar spine resulting in increased pain and difficulty with ambulation. Plan:   The picture here is quite complicated. There is a mass at L3 that was clearly present 2 weeks ago on MRI and appears to have progressed causing increased pain and possibly lower extremity weakness. After speaking with the patient and with the hospitalist, I have recommended starting IV steroids and tweaking her pain medications over the weekend. I have scheduled her for simulation and treatment of the lumbar spine on Tuesday morning with plans to deliver dose of 30 Gy in 10 fractions. This was discussed with Dr. Tari Saldivar from the hospitalist service and will be discussed with Dr. Madhuri Burt on Monday morning when he returns from vacation. I have given Dr. Tari Saldivar my cell phone number and advised her to contact me if there is any dramatic change in the patient's neurologic status.      30 minutes was spent with the patient more than half of which was spent in face-to-face counseling.       Signed By: Vincenza Holter, MD     December 30, 2022

## 2022-12-30 NOTE — H&P
Hospitalist History & Physical Notes. Abdirahman Ceasar. Name : Marjan Marcial      MRN number : [id number]     YOB: 1940     Subjective :   Chief Complaint : Lower extremity weakness with difficulty walking, severe back pain    Source of information : Patient, ED provider, previous records    History of present illness:   78 y.o. female history of carcinoma of the lung with lobectomy in 1996, since then on follow-up with surveillance. She woke up this morning with severe lower extremity weakness and pain unable to stand up and walk. 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 She is recommended to follow-up with oncology, had MRI and PET scan scheduled end of January 2023. States the back pain started getting worse, its severe, given oxycodone 10 mg 3 times a day but not helping the pain. Its radiating into the legs, now with severe weakness. She is crying with pain worse with any movement. Due to the mass in the lumbar spine requested for CT of thoracic spine and lumbar spine, consult placed to radiation oncology, I called by myself and discussed with radiation oncologist.  He is kind enough to come and see the patient evaluated, as she is not completely paralyzed and recommended to do the steroids at this time and follow-up. He is going to start radiation treatment on Tuesday if patient continues to be stable if not otherwise to contact on-call radiation oncology.     Past Medical History:   Diagnosis Date    Arrhythmia     \"fast heart rate\" at times, takes Toprol to control    Arthritis     Asthma     Cancer [facility]) 1996    RUL lung    Cancer ([facility] 2001    left breast    Cancer ([facility] 2013, 2014    right lung    COPD     Dyslipidemia 4/29/2014    GERD (gastroesophageal reflux disease)     Hyperlipidemia     Irregular heart beat 1st-1983,early 's    SVT per patient-none since starting metoprolol early     Lung cancer ([facility] 2014    Lung cancer, lower lobe ([facility] [address] 3/2013    T3Nx adenocarcinoma RLL    Other ill-defined conditions(799.89)     urinary urgency    Unspecified adverse effect of anesthesia     severe itching after lung surgery     Past Surgical History:   Procedure Laterality Date    HX APPENDECTOMY      HX BLADDER SUSPENSION  around     HX BREAST LUMPECTOMY      left w/nodes removed    HX CATARACT REMOVAL Bilateral 2013    HX HYSTERECTOMY  1985    ANTON    HX ORTHOPAEDIC Right     bunion removal    HX OTHER SURGICAL  3/5/2013    RLL superior segmentectomy    HX OTHER SURGICAL  3/11/2013    Resection of RLL margins    HX UROLOGICAL      Bladder tuck    ME CHEST SURGERY PROCEDURE UNLISTED  , ,     right lung (see \"other\")    ME THORACOSCOPY SURG LOBECTOMY      RUL lung cancer     Family History   Problem Relation Age of Onset    Cancer Mother         colon    Cancer Father         prostate    Cancer Sister         breast    Cancer Other         breast    Ovarian Cancer Other     No Known Problems Brother     No Known Problems Maternal Grandmother     No Known Problems Maternal Grandfather     No Known Problems Paternal Grandmother     No Known Problems Paternal Grandfather       Social History     Tobacco Use    Smoking status: Former     Packs/day: 1.00     Years: 60.00     Pack years: 60.00     Types: Cigarettes     Quit date: 3/4/2013     Years since quittin.8    Smokeless tobacco: Never   Substance Use Topics    Alcohol use: No       Allergies   Allergen Reactions    Contrast Agent [Iodine] Rash    Adhesive Tape-Silicones Itching and Contact Dermatitis      Current Facility-Administered Medications   Medication Dose Route Frequency Provider Last Rate Last Admin    dextrose 5% and 0.9% NaCl infusion  125 mL/hr IntraVENous CONTINUOUS Jaden Sanchez MD        albuterol (PROVENTIL HFA, VENTOLIN HFA, PROAIR HFA) inhaler 2 Puff  2 Puff Inhalation Q6H PRN Mari Forte, MD Suellyn Frankel ON 70/53/1776] folic acid (FOLVITE) tablet 1 mg  1 mg Oral JAH Singh MD        gabapentin (NEURONTIN) capsule 400 mg  400 mg Oral TID Mari Forte, MD Suellyn Frankel ON 12/31/2022] omeprazole (PRILOSEC) capsule 40 mg  40 mg Oral JAH Singh MD        albuterol-ipratropium (DUO-NEB) 2.5 MG-0.5 MG/3 ML  3 mL Nebulization QID Lizette Singh MD        sodium chloride (NS) flush 5-40 mL  5-40 mL IntraVENous Q8H Lizette Singh MD        sodium chloride (NS) flush 5-40 mL  5-40 mL IntraVENous PRN Lizette Singh MD        acetaminophen (TYLENOL) tablet 650 mg  650 mg Oral Q6H PRN Lizette Singh MD        Or    acetaminophen (TYLENOL) suppository 650 mg  650 mg Rectal Q6H PRN Lizette Singh MD        ondansetron (ZOFRAN ODT) tablet 4 mg  4 mg Oral Q6H PRN Lizette Singh MD        Or    ondansetron (ZOFRAN) injection 4 mg  4 mg IntraVENous Q6H PRN Lizette Singh MD        [START ON 12/31/2022] enoxaparin (LOVENOX) injection 40 mg  40 mg SubCUTAneous DAILY Lizette Singh MD         Current Outpatient Medications   Medication Sig Dispense Refill    albuterol-ipratropium (DUO-NEB) 2.5 mg-0.5 mg/3 ml nebu 3 mL by Nebulization route four (4) times daily. oxyCODONE IR (ROXICODONE) 10 mg tab immediate release tablet Take 10 mg by mouth three (3) times daily. gabapentin (NEURONTIN) 300 mg capsule Take 400 mg by mouth three (3) times daily. omeprazole (PRILOSEC) 20 mg capsule Take 40 mg by mouth Daily (before breakfast). Trelegy Ellipta 200-62.5-25 mcg inhaler Take 1 Puff by inhalation daily. rosuvastatin (Crestor) 10 mg tablet Take 10 mg by mouth nightly. iron polysaccharides (FERREX 150) 150 mg iron capsule Take 150 mg by mouth Daily (before breakfast).       folic acid (FOLVITE) 1 mg tablet Take  by mouth Daily (before breakfast). metoprolol succinate (TOPROL-XL) 50 mg XL tablet Take 1 Tab by mouth daily. 60 Tab 1    albuterol (PROVENTIL HFA, VENTOLIN HFA, PROAIR HFA) 90 mcg/actuation inhaler Take 2 Puffs by inhalation every six (6) hours as needed for Wheezing or Shortness of Breath. Review of Systems: She denies any other complaints other than severe pain but as she is in so much pain not communicating much. Vitals:   Patient Vitals for the past 12 hrs:   Temp Pulse Resp BP SpO2   12/30/22 1535 -- 69 18 100/81 96 %   12/30/22 1208 98.3 °F (36.8 °C) 74 18 (!) 91/53 98 %       Physical Exam:   General : She is well-built, good looking for her age but in distress due to pain. No respiratory distress noted  HEENT : PERRLA, normal oral mucosa, atraumatic normocephalic, Normal ear and nose. Neck : Supple, no JVD, no masses noted, no carotid bruit. Lungs : Breath sounds with moderate air entry bilaterally, no wheezes or rales, no accessory muscle use. CVS : Rhythm rate regular, S1+, S2+, no murmur or gallop. Abdomen : Soft, nontender, bowel sounds active. Extremities : No edema noted,  pedal pulses palpable. Musculoskeletal : Unable to move lower EXTR well, able to lift a little bit. Complains of pain when trying to move. No joint swelling or effusion noted  Skin : Moist, warm, skin turgor, no pathological rash. Lymphatic : No cervical lymphadenopathy. Neurological : Awake, alert, oriented to time place person. Psychiatric : Mood and affect appears appropriate to the situation.          Data Review:   Recent Results (from the past 24 hour(s))   CBC WITH AUTOMATED DIFF    Collection Time: 12/30/22  1:39 PM   Result Value Ref Range    WBC 6.0 3.6 - 11.0 K/uL    RBC 3.52 (L) 3.80 - 5.20 M/uL    HGB 10.6 (L) 11.5 - 16.0 g/dL    HCT 35.8 35.0 - 47.0 %    .7 (H) 80.0 - 99.0 FL    MCH 30.1 26.0 - 34.0 PG    MCHC 29.6 (L) 30.0 - 36.5 g/dL    RDW 14.0 11.5 - 14.5 %    PLATELET 514 500 - 400 K/uL    MPV 8.9 8.9 - 12.9 FL    NRBC 0.0 0.0  WBC    ABSOLUTE NRBC 0.00 0.00 - 0.01 K/uL    NEUTROPHILS 55 32 - 75 %    LYMPHOCYTES 30 12 - 49 %    MONOCYTES 10 5 - 13 %    EOSINOPHILS 5 0 - 7 %    BASOPHILS 0 0 - 1 %    IMMATURE GRANULOCYTES 0 0 - 0.5 %    ABS. NEUTROPHILS 3.3 1.8 - 8.0 K/UL    ABS. LYMPHOCYTES 1.8 0.8 - 3.5 K/UL    ABS. MONOCYTES 0.6 0.0 - 1.0 K/UL    ABS. EOSINOPHILS 0.3 0.0 - 0.4 K/UL    ABS. BASOPHILS 0.0 0.0 - 0.1 K/UL    ABS. IMM. GRANS. 0.0 0.00 - 0.04 K/UL    DF AUTOMATED     METABOLIC PANEL, BASIC    Collection Time: 12/30/22  1:39 PM   Result Value Ref Range    Sodium 143 136 - 145 mmol/L    Potassium 4.2 3.5 - 5.1 mmol/L    Chloride 103 97 - 108 mmol/L    CO2 33 (H) 21 - 32 mmol/L    Anion gap 7 5 - 15 mmol/L    Glucose 90 65 - 100 mg/dL    BUN 14 6 - 20 mg/dL    Creatinine 0.54 (L) 0.55 - 1.02 mg/dL    BUN/Creatinine ratio 26 (H) 12 - 20      eGFR >60 >60 ml/min/1.73m2    Calcium 9.6 8.5 - 10.1 mg/dL       Radiologic Studies :   Reviewed the CT report done on December 16. Assessment and Plan :     Gait disturbance with lower extremity weakness: Most likely secondary to cord compression and tumor. Will start on dexamethasone as per radiation oncology recommendations, 10 mg of 1 dose now then 8 mg every 8 hours and monitor closely. Severe back pain secondary to suspected spinal mass: We will increase the dose of the oxycodone to long-acting OxyContin 20 mg twice a day with IV morphine breakthrough pain relief. History of carcinoma of the lung adenocarcinoma with lobectomy with suspected recurrence metastatic at this time. Chronic obstructive pulmonary disease: Mild with no signs of exacerbation on DuoNeb which we will continue    Patient is admitted to surgical floor, full CODE STATUS, daughter will make decisions in case if she cannot, has advanced medical directives on file. Started on enoxaparin for VTE prophylaxis as she may not be ambulating well.       CC : Chico Pappas MD  Signed By: Traci Linton MD     December 30, 2022      This dictation was done by dragon, computer voice recognition software. Often unanticipated grammatical, syntax, Stevenson phones and other interpretive errors are inadvertently transcribed. Please excuse errors that have escaped final proofreading.

## 2022-12-30 NOTE — ED PROVIDER NOTES
EMERGENCY DEPARTMENT HISTORY AND PHYSICAL EXAM      Date: 12/30/2022  Patient Name: Candy Shafer    History of Presenting Illness     Chief Complaint   Patient presents with    Leg Pain       History Provided By: Patient    HPI: Candy Shafer, 78 y.o. female with a history of lung cancer, s/p partial lobectomy in 1996  presented to the ED complaining of bilateral lower extremity pain and severe back pain. Recent MRI on 12/16/22 showed a intradural mass posterior to L3. Patient states her symptoms have worsened over the last 2 days and she is now having gait disturbance and is unable to ambulate without assistance. Last dose of pain medications were this morning at 0900, without relief. Patient's daughter is in the waiting room. There are no other complaints, changes, or physical findings at this time.     Past History     Past Medical History:  Past Medical History:   Diagnosis Date    Arrhythmia     \"fast heart rate\" at times, takes Toprol to control    Arthritis     Asthma     Cancer ([address] 1996    RUL lung    Cancer ([address] 2001    left breast    Cancer ([address] 2013, 2014    right lung    COPD     Dyslipidemia 4/29/2014    GERD (gastroesophageal reflux disease)     Hyperlipidemia     Irregular heart beat 1st-1983,early 2000's    SVT per patient-none since starting metoprolol early 2000's    Lung cancer ([address] 4/29/2014    Lung cancer, lower lobe ([address] 3/2013    T3Nx adenocarcinoma RLL    Other ill-defined conditions(799.89)     urinary urgency    Unspecified adverse effect of anesthesia     severe itching after lung surgery       Past Surgical History:  Past Surgical History:   Procedure Laterality Date    HX APPENDECTOMY      HX BLADDER SUSPENSION  around 2004    HX BREAST LUMPECTOMY  2001    left w/nodes removed    HX CATARACT REMOVAL Bilateral 2013    HX HYSTERECTOMY  1985    ANTON    HX ORTHOPAEDIC Right 1980's    bunion removal    HX OTHER SURGICAL  3/5/2013    RLL superior segmentectomy    HX OTHER SURGICAL  3/11/2013    Resection of RLL margins    HX UROLOGICAL      Bladder tuck    [place] CHEST SURGERY PROCEDURE UNLISTED  , ,     right lung (see \"other\")    [place] THORACOSCOPY SURG LOBECTOMY      RUL lung cancer       Family History:  Family History   Problem Relation Age of Onset    Cancer Mother         colon    Cancer Father         prostate    Cancer Sister         breast    Cancer Other         breast    Ovarian Cancer Other     No Known Problems Brother     No Known Problems Maternal Grandmother     No Known Problems Maternal Grandfather     No Known Problems Paternal Grandmother     No Known Problems Paternal Grandfather        Social History:  Social History     Tobacco Use    Smoking status: Former     Packs/day: 1.00     Years: 60.00     Pack years: 60.00     Types: Cigarettes     Quit date: 3/4/2013     Years since quittin.8    Smokeless tobacco: Never   Substance Use Topics    Alcohol use: No    Drug use: No       Allergies: Allergies   Allergen Reactions    Contrast Agent [Iodine] Rash    Adhesive Tape-Silicones Itching and Contact Dermatitis       PCP: Hesham Beasley MD    No current facility-administered medications on file prior to encounter. Current Outpatient Medications on File Prior to Encounter   Medication Sig Dispense Refill    albuterol-ipratropium (DUO-NEB) 2.5 mg-0.5 mg/3 ml nebu 3 mL by Nebulization route every four (4) hours as needed for Wheezing. 30 Nebule 1    rosuvastatin (Crestor) 10 mg tablet Take 10 mg by mouth nightly.  iron polysaccharides (FERREX 150) 150 mg iron capsule Take 150 mg by mouth Daily (before breakfast).  folic acid (FOLVITE) 1 mg tablet Take  by mouth Daily (before breakfast).  [DISCONTINUED] oxyCODONE-acetaminophen (PERCOCET 10)  mg per tablet Take 1 tablet by mouth.  metoprolol succinate (TOPROL-XL) 50 mg XL tablet Take 1 Tab by mouth daily.  60 Tab 1    [DISCONTINUED] diphenhydrAMINE (BENADRYL) 25 mg tablet Take 25 mg by mouth every six (6) hours as needed (taken during allergy season).  [DISCONTINUED] acetaminophen (TYLENOL) 500 mg tablet Take 500 mg by mouth every six (6) hours as needed.  fluticasone-salmeterol (ADVAIR DISKUS) 250-50 mcg/dose diskus inhaler Take 1 Puff by inhalation every twelve (12) hours.  albuterol (VENTOLIN HFA) 90 mcg/Actuation inhaler Take 2 Puffs by inhalation every six (6) hours as needed.  simvastatin (ZOCOR) 20 mg tablet Take 20 mg by mouth nightly.  [DISCONTINUED] esomeprazole (NEXIUM) 40 mg capsule Take 40 mg by mouth Daily (before breakfast). Review of Systems   Review of Systems  General: No fever  Neuro: No headache   CV: No chest pain  Resp: No shortness of breath  GI: No vomiting  MSK: (+) low back pain  : (-) hematuria. (-)flank pain  All other systems reviewed are negative except as documented in the HPI. Physical Exam   Physical Exam  General: Well developed, well nourished patient in severe discomfort. Skin/Derm: Skin is warm and dry. Back: There is paralumbar muscle tenderness. No midline spinal tenderness. SLR is negative at 90 degrees bilaterally. The patient is  unable to twist at the waist without mild-moderate discomfort. Pulm: No tachypnea or retractions. GI: Soft and non-tender  Neuro: No facial asymmetry. Alert. Psych: Affect is normal.  Anxious. Lab and Diagnostic Study Results   Labs -   No results found for this or any previous visit (from the past 12 hour(s)).     Radiologic Studies -   @lastxrresult@  CT Results  (Last 48 hours)      None          CXR Results  (Last 48 hours)      None            Medical Decision Making and ED Course   Differential Diagnosis & Medical Decision Making Provider Note:       Patient with a history of lung cancer s/p lobectomy in 1996, presented to the emergency department complaining of back pain radiating into her legs bilaterally. She is able to void without issue which would be concerning for cord compression. Patient is in severe pain and is no longer able to ambulate without assistance. Currently on pain medications and Gabapentin with a recent increase in dosing. She continues to have pain that is refractory to current therapy. Spoke with oncology and will start patient on steroids per recommendation. Patient is now unable to ambulate without assistance and is experiencing new gait instability. I discussed with hospitalist for admission.     - I am the first provider for this patient. I reviewed the vital signs, available nursing notes, past medical history, past surgical history, family history and social history. The patients presenting problems have been discussed, and they are in agreement with the care plan formulated and outlined with them. I have encouraged them to ask questions as they arise throughout their visit. Vital Signs-Reviewed the patient's vital signs. Patient Vitals for the past 12 hrs:   Temp Pulse Resp BP SpO2   12/30/22 1208 98.3 °F (36.8 °C) 74 18 (!) 91/53 98 %       ED Course:   ED Course as of 12/30/22 1400   Fri Dec 30, 2022   1359 BP 91/53, will give 500 cc NS fluid bolus and reassess BP [TS]      ED Course User Index  [TS] Florence Lezama NP         Procedures   Performed by: Jeanie Ramon NP  Procedures      Disposition   Disposition: Admitted to Floor Medical Floor the case was discussed with the admitting physician Dr. Fahran Cope. DISCHARGE PLAN:  1. Current Discharge Medication List        CONTINUE these medications which have NOT CHANGED    Details   albuterol-ipratropium (DUO-NEB) 2.5 mg-0.5 mg/3 ml nebu 3 mL by Nebulization route every four (4) hours as needed for Wheezing. Qty: 30 Nebule, Refills: 1      rosuvastatin (Crestor) 10 mg tablet Take 10 mg by mouth nightly. iron polysaccharides (FERREX 150) 150 mg iron capsule Take 150 mg by mouth Daily (before breakfast). folic acid (FOLVITE) 1 mg tablet Take  by mouth Daily (before breakfast). metoprolol succinate (TOPROL-XL) 50 mg XL tablet Take 1 Tab by mouth daily. Qty: 60 Tab, Refills: 1      fluticasone-salmeterol (ADVAIR DISKUS) 250-50 mcg/dose diskus inhaler Take 1 Puff by inhalation every twelve (12) hours. albuterol (VENTOLIN HFA) 90 mcg/Actuation inhaler Take 2 Puffs by inhalation every six (6) hours as needed. simvastatin (ZOCOR) 20 mg tablet Take 20 mg by mouth nightly. 2.   Follow-up Information    None       3. Return to ED if worse   4. Current Discharge Medication List            Diagnosis/Clinical Impression     Clinical Impression: No diagnosis found. Attestations: Sabas Denton NP, am the primary clinician of record. Please note that this dictation was completed with [employer], the [employer] voice recognition software. Quite often unanticipated grammatical, syntax, homophones, and other interpretive errors are inadvertently transcribed by the computer software. Please disregard these errors. Please excuse any errors that have escaped final proofreading. Thank you.

## 2022-12-30 NOTE — ED TRIAGE NOTES
Pt arrives to ED by EMS from home. Pt reports bilateral leg pain and difficulty ambulating. Pt had recent MRI and PET scan that showed malignancy in spine. Pt currently being treated for cellulitis in bilateral calves.

## 2022-12-30 NOTE — PROGRESS NOTES
Reason for Admission:  Leg Pain                     RUR Score: 11%                    Plan for utilizing home health: Not at this time         PCP: First and Last name:  Hudson Rivera MD     Name of Practice:    Are you a current patient: Yes/No:    Approximate date of last visit: Last month   Can you participate in a virtual visit with your PCP:                     Current Advanced Directive/Advance Care Plan: Full Code      Healthcare Decision Maker:   Click here to complete [address] including selection of the Healthcare Decision Maker Relationship (ie \"Primary\")             Primary Decision MakerAlraColumb[facility] Hospital[facility] - [facility] - [phone]                  Transition of Care Plan:      Met f/f with Pt, she confirmed that the information on the face sheet is correct. Pt stated that she lives with her daughter. Pt stated no HH, she has a walker, and cane and is on [employer] supplied by [employer] stated that she is independent with ADL    Pt stated that [facility] [address]    Pt stated that family will give her a ride home when she is D/C. CM dispo: home with no needs at this time.

## 2022-12-31 LAB
GLUCOSE BLD STRIP.AUTO-MCNC: 145 MG/DL (ref 65–100)
PERFORMED BY, TECHID: ABNORMAL

## 2022-12-31 PROCEDURE — 82962 GLUCOSE BLOOD TEST: CPT

## 2022-12-31 PROCEDURE — 94761 N-INVAS EAR/PLS OXIMETRY MLT: CPT

## 2022-12-31 PROCEDURE — 65270000029 HC RM PRIVATE

## 2022-12-31 PROCEDURE — 74011000258 HC RX REV CODE- 258: Performed by: HOSPITALIST

## 2022-12-31 PROCEDURE — 74011250637 HC RX REV CODE- 250/637: Performed by: HOSPITALIST

## 2022-12-31 PROCEDURE — 74011250636 HC RX REV CODE- 250/636: Performed by: HOSPITALIST

## 2022-12-31 PROCEDURE — 94640 AIRWAY INHALATION TREATMENT: CPT

## 2022-12-31 PROCEDURE — 74011000250 HC RX REV CODE- 250: Performed by: HOSPITALIST

## 2022-12-31 PROCEDURE — 77010033678 HC OXYGEN DAILY

## 2022-12-31 RX ORDER — BUDESONIDE AND FORMOTEROL FUMARATE DIHYDRATE 160; 4.5 UG/1; UG/1
1 AEROSOL RESPIRATORY (INHALATION)
Status: DISCONTINUED | OUTPATIENT
Start: 2022-12-31 | End: 2023-01-18 | Stop reason: HOSPADM

## 2022-12-31 RX ORDER — BUDESONIDE AND FORMOTEROL FUMARATE DIHYDRATE 160; 4.5 UG/1; UG/1
2 AEROSOL RESPIRATORY (INHALATION)
Status: DISCONTINUED | OUTPATIENT
Start: 2022-12-31 | End: 2022-12-31

## 2022-12-31 RX ORDER — BUDESONIDE AND FORMOTEROL FUMARATE DIHYDRATE 160; 4.5 UG/1; UG/1
1 AEROSOL RESPIRATORY (INHALATION)
Status: DISCONTINUED | OUTPATIENT
Start: 2022-12-31 | End: 2022-12-31

## 2022-12-31 RX ORDER — IPRATROPIUM BROMIDE AND ALBUTEROL SULFATE 2.5; .5 MG/3ML; MG/3ML
3 SOLUTION RESPIRATORY (INHALATION)
Status: DISCONTINUED | OUTPATIENT
Start: 2022-12-31 | End: 2023-01-18 | Stop reason: HOSPADM

## 2022-12-31 RX ADMIN — GABAPENTIN 400 MG: 400 CAPSULE ORAL at 21:13

## 2022-12-31 RX ADMIN — FOLIC ACID 1 MG: 1 TABLET ORAL at 09:36

## 2022-12-31 RX ADMIN — MORPHINE SULFATE 2 MG: 2 INJECTION, SOLUTION INTRAMUSCULAR; INTRAVENOUS at 21:13

## 2022-12-31 RX ADMIN — DEXTROSE AND SODIUM CHLORIDE 125 ML/HR: 5; 900 INJECTION, SOLUTION INTRAVENOUS at 05:23

## 2022-12-31 RX ADMIN — DEXAMETHASONE SODIUM PHOSPHATE 8 MG: 4 INJECTION, SOLUTION INTRA-ARTICULAR; INTRALESIONAL; INTRAMUSCULAR; INTRAVENOUS; SOFT TISSUE at 00:25

## 2022-12-31 RX ADMIN — MORPHINE SULFATE 2 MG: 2 INJECTION, SOLUTION INTRAMUSCULAR; INTRAVENOUS at 05:46

## 2022-12-31 RX ADMIN — OXYCODONE HYDROCHLORIDE 20 MG: 20 TABLET, FILM COATED, EXTENDED RELEASE ORAL at 20:10

## 2022-12-31 RX ADMIN — DEXAMETHASONE SODIUM PHOSPHATE 8 MG: 4 INJECTION, SOLUTION INTRA-ARTICULAR; INTRALESIONAL; INTRAMUSCULAR; INTRAVENOUS; SOFT TISSUE at 21:13

## 2022-12-31 RX ADMIN — SODIUM CHLORIDE, PRESERVATIVE FREE 10 ML: 5 INJECTION INTRAVENOUS at 21:15

## 2022-12-31 RX ADMIN — DEXAMETHASONE SODIUM PHOSPHATE 8 MG: 4 INJECTION, SOLUTION INTRA-ARTICULAR; INTRALESIONAL; INTRAMUSCULAR; INTRAVENOUS; SOFT TISSUE at 17:42

## 2022-12-31 RX ADMIN — SODIUM CHLORIDE, PRESERVATIVE FREE 10 ML: 5 INJECTION INTRAVENOUS at 05:19

## 2022-12-31 RX ADMIN — ACETAMINOPHEN 650 MG: 325 TABLET ORAL at 09:36

## 2022-12-31 RX ADMIN — BUDESONIDE AND FORMOTEROL FUMARATE DIHYDRATE 1 PUFF: 160; 4.5 AEROSOL RESPIRATORY (INHALATION) at 21:16

## 2022-12-31 RX ADMIN — ENOXAPARIN SODIUM 40 MG: 100 INJECTION SUBCUTANEOUS at 09:36

## 2022-12-31 RX ADMIN — PANTOPRAZOLE SODIUM 40 MG: 40 TABLET, DELAYED RELEASE ORAL at 09:36

## 2022-12-31 RX ADMIN — OXYCODONE HYDROCHLORIDE 20 MG: 20 TABLET, FILM COATED, EXTENDED RELEASE ORAL at 09:36

## 2022-12-31 RX ADMIN — MORPHINE SULFATE 2 MG: 2 INJECTION, SOLUTION INTRAMUSCULAR; INTRAVENOUS at 10:37

## 2022-12-31 RX ADMIN — GABAPENTIN 400 MG: 400 CAPSULE ORAL at 09:36

## 2022-12-31 RX ADMIN — GABAPENTIN 400 MG: 400 CAPSULE ORAL at 16:26

## 2022-12-31 RX ADMIN — DEXAMETHASONE SODIUM PHOSPHATE 8 MG: 4 INJECTION, SOLUTION INTRA-ARTICULAR; INTRALESIONAL; INTRAMUSCULAR; INTRAVENOUS; SOFT TISSUE at 10:43

## 2022-12-31 RX ADMIN — MORPHINE SULFATE 2 MG: 2 INJECTION, SOLUTION INTRAMUSCULAR; INTRAVENOUS at 14:51

## 2022-12-31 RX ADMIN — SODIUM CHLORIDE, PRESERVATIVE FREE 10 ML: 5 INJECTION INTRAVENOUS at 17:43

## 2022-12-31 NOTE — PROGRESS NOTES
Problem: Falls - Risk of  Goal: *Absence of Falls  Description: Document Arnav Banuelos Fall Risk and appropriate interventions in the flowsheet.   Outcome: Progressing Towards Goal  Note: Fall Risk Interventions:                                Problem: Pain  Goal: *Control of Pain  Outcome: Progressing Towards Goal     Problem: General Medical Care Plan  Goal: *Vital signs within specified parameters  Outcome: Progressing Towards Goal  Goal: *Labs within defined limits  Outcome: Progressing Towards Goal  Goal: *Absence of infection signs and symptoms  Outcome: Progressing Towards Goal  Goal: *Optimal pain control at patient's stated goal  Outcome: Progressing Towards Goal     Problem: Patient Education: Go to Patient Education Activity  Goal: Patient/Family Education  Outcome: Progressing Towards Goal

## 2022-12-31 NOTE — PROGRESS NOTES
Hospitalist Progress Note        Daily Progress Note: 12/31/2022 11:32 AM  Hospital Course     Gely Otero 78 y.o. female history of carcinoma of the lung with lobectomy in 1996, since then on follow-up with surveillance. She woke up this morning with severe lower extremity weakness and pain unable to stand up and walk. 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 She is recommended to follow-up with oncology, had MRI and PET scan scheduled end of January 2023. States the back pain started getting worse, its severe, given oxycodone 10 mg 3 times a day but not helping the pain. Its radiating into the legs, now with severe weakness. She is crying with pain worse with any movement. Due to the mass in the lumbar spine requested for CT of thoracic spine and lumbar spine, consult placed to radiation oncology. As she is not completely paralyzed, radiation oncology recommended to continue steroids at this time and follow-up. He is going to start radiation treatment on Tuesday if patient continues to be stable if not otherwise to contact on-call radiation oncology. Subjective:     Patient seen and examined at bedside. She report significant pain. States it is difficult to lift her legs. Asking to be repositioned since she has been laying on her right side all night. REVIEW OF SYSTEMS    Constitutional:  Negative for chills, fever and malaise/fatigue. HENT:  Negative for congestion and sore throat. Eyes:  Negative for blurred vision and double vision. Respiratory:  Negative for cough, sputum production, shortness of breath and wheezing. Cardiovascular:  Negative for chest pain, palpitations, orthopnea and leg swelling. Gastrointestinal:  Negative for abdominal pain, blood in stool, constipation, diarrhea, nausea and vomiting.    Genitourinary:  Negative for dysuria, flank pain, frequency, hematuria and urgency. Musculoskeletal:  + back pain, + joint pain, + myalgias  Skin:  Negative for itching and rash. Neurological:  Negative for dizziness, speech change, seizures and headaches. Psychiatric/Behavioral:  Negative for depression. The patient is not nervous/anxious. Objective:     Current Facility-Administered Medications   Medication Dose Route Frequency    albuterol-ipratropium (DUO-NEB) 2.5 MG-0.5 MG/3 ML  3 mL Nebulization Q4H PRN    albuterol (PROVENTIL HFA, VENTOLIN HFA, PROAIR HFA) inhaler 2 Puff  2 Puff Inhalation E1C PRN    folic acid (FOLVITE) tablet 1 mg  1 mg Oral ACB    gabapentin (NEURONTIN) capsule 400 mg  400 mg Oral TID    pantoprazole (PROTONIX) tablet 40 mg  40 mg Oral ACB    sodium chloride (NS) flush 5-40 mL  5-40 mL IntraVENous Q8H    sodium chloride (NS) flush 5-40 mL  5-40 mL IntraVENous PRN    acetaminophen (TYLENOL) tablet 650 mg  650 mg Oral Q6H PRN    Or    acetaminophen (TYLENOL) suppository 650 mg  650 mg Rectal Q6H PRN    ondansetron (ZOFRAN ODT) tablet 4 mg  4 mg Oral Q6H PRN    Or    ondansetron (ZOFRAN) injection 4 mg  4 mg IntraVENous Q6H PRN    enoxaparin (LOVENOX) injection 40 mg  40 mg SubCUTAneous DAILY    oxyCODONE ER (OxyCONTIN) tablet 20 mg  20 mg Oral Q12H    dexamethasone (DECADRON) 4 mg/mL injection 8 mg  8 mg IntraVENous Q8H    morphine injection 2 mg  2 mg IntraVENous Q4H PRN       Visit Vitals  BP (!) 119/52 (BP 1 Location: Left upper arm, BP Patient Position: At rest;Lying right side)   Pulse 77   Temp 98 °F (36.7 °C)   Resp 20   Ht 5' 6\" (1.676 m)   Wt 81.6 kg (180 lb)   SpO2 96%   BMI 29.05 kg/m²    O2 Flow Rate (L/min): 2 l/min O2 Device: Nasal cannula    Temp (24hrs), Av.9 °F (36.6 °C), Min:97.6 °F (36.4 °C), Max:98.3 °F (36.8 °C)      No intake/output data recorded. 1 -  0700  In: 1724 [P.O.:300;  I.V.:1375]  Out: 800 [Urine:800]    PHYSICAL EXAM:    Constitutional: No acute distress  Skin: Extremities and face reveal no rashes. HEENT: Sclerae anicteric. PERRL. No oral ulcers. The neck is supple and no masses. Cardiovascular: Regular rate and rhythm. +S1/S2. No murmur or gallop. No JVD. Respiratory:  Clear breath sounds bilaterally with no wheezes, rales, or rhonchi. GI: Abdomen nondistended, soft, and nontender. Normal active bowel sounds. Rectal: Deferred   Musculoskeletal: Difficulty lifting lower extremities, right > left s/t pain and weakness. No pitting edema of the lower legs. Neurological:  Patient is A&Ox3. Cranial nerves II-XII grossly intact  Psychiatric: Mood and affect appropriate    Data Review    Recent Results (from the past 24 hour(s))   CBC WITH AUTOMATED DIFF    Collection Time: 12/30/22  1:39 PM   Result Value Ref Range    WBC 6.0 3.6 - 11.0 K/uL    RBC 3.52 (L) 3.80 - 5.20 M/uL    HGB 10.6 (L) 11.5 - 16.0 g/dL    HCT 35.8 35.0 - 47.0 %    .7 (H) 80.0 - 99.0 FL    MCH 30.1 26.0 - 34.0 PG    MCHC 29.6 (L) 30.0 - 36.5 g/dL    RDW 14.0 11.5 - 14.5 %    PLATELET 668 113 - 506 K/uL    MPV 8.9 8.9 - 12.9 FL    NRBC 0.0 0.0  WBC    ABSOLUTE NRBC 0.00 0.00 - 0.01 K/uL    NEUTROPHILS 55 32 - 75 %    LYMPHOCYTES 30 12 - 49 %    MONOCYTES 10 5 - 13 %    EOSINOPHILS 5 0 - 7 %    BASOPHILS 0 0 - 1 %    IMMATURE GRANULOCYTES 0 0 - 0.5 %    ABS. NEUTROPHILS 3.3 1.8 - 8.0 K/UL    ABS. LYMPHOCYTES 1.8 0.8 - 3.5 K/UL    ABS. MONOCYTES 0.6 0.0 - 1.0 K/UL    ABS. EOSINOPHILS 0.3 0.0 - 0.4 K/UL    ABS. BASOPHILS 0.0 0.0 - 0.1 K/UL    ABS. IMM.  GRANS. 0.0 0.00 - 0.04 K/UL    DF AUTOMATED     METABOLIC PANEL, BASIC    Collection Time: 12/30/22  1:39 PM   Result Value Ref Range    Sodium 143 136 - 145 mmol/L    Potassium 4.2 3.5 - 5.1 mmol/L    Chloride 103 97 - 108 mmol/L    CO2 33 (H) 21 - 32 mmol/L    Anion gap 7 5 - 15 mmol/L    Glucose 90 65 - 100 mg/dL    BUN 14 6 - 20 mg/dL    Creatinine 0.54 (L) 0.55 - 1.02 mg/dL    BUN/Creatinine ratio 26 (H) 12 - 20      eGFR >60 >60 ml/min/1.73m2 Calcium 9.6 8.5 - 10.1 mg/dL       CT SPINE LUMB WO CONT   Final Result   1. No evidence of acute fracture. 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   2. 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 CT SPINE [facility] WO CONT   Final Result   1. No evidence of acute fracture. Active Problems:    Acute back pain (12/30/2022)      Lower extremity weakness (12/30/2022)      Gait disturbance (12/30/2022)        Assessment/Plan:     1. Gait disturbance with lower extremity weakness  - Most likely secondary to cord compression and tumor  - Continue IV dexamethasone as per radiation oncology recommendations, 10 mg of 1 dose now then 8 mg every 8 hours and monitor closely. 2. Severe back pain secondary to suspected spinal mass  - Increased the dose of the oxycodone to long-acting OxyContin 20 mg twice a day with IV morphine breakthrough pain relief. 3. History of carcinoma of the lung adenocarcinoma with lobectomy with suspected recurrence metastatic at this time.      4. Chronic obstructive pulmonary disease  - Mild with no signs of exacerbation on DuoNeb which we will continue      DVT Prophylaxis: Lovenox  GI Prophylaxis: Protonix   Code Status: Full Code  POA/NOK:    Disposition and discharge barriers:   Pain control  IV steroid  Radiation oncology to arrange radiation Tuesday    Care Plan discussed with: nursing, patient, and daughter at bedside  _____________________________________________________________________________  Time spent in direct care including coordination of service, review of data and examination: > 35 minutes    ______________________________________________________________________________    Michael Quinteros PA-C

## 2022-12-31 NOTE — ROUTINE PROCESS
Bedside shift change report given to Fernandez Ramsey (oncoming nurse) by Roger Joshi RN (offgoing nurse). Report included the following information SBAR.

## 2023-01-01 LAB
ANION GAP SERPL CALC-SCNC: 3 MMOL/L (ref 5–15)
BUN SERPL-MCNC: 14 MG/DL (ref 6–20)
BUN/CREAT SERPL: 29 (ref 12–20)
CA-I BLD-MCNC: 9.3 MG/DL (ref 8.5–10.1)
CHLORIDE SERPL-SCNC: 103 MMOL/L (ref 97–108)
CO2 SERPL-SCNC: 33 MMOL/L (ref 21–32)
CREAT SERPL-MCNC: 0.49 MG/DL (ref 0.55–1.02)
CRP SERPL-MCNC: <0.29 MG/DL (ref 0–0.6)
ERYTHROCYTE [DISTWIDTH] IN BLOOD BY AUTOMATED COUNT: 13.9 % (ref 11.5–14.5)
GLUCOSE SERPL-MCNC: 167 MG/DL (ref 65–100)
HCT VFR BLD AUTO: 35 % (ref 35–47)
HGB BLD-MCNC: 10.7 G/DL (ref 11.5–16)
MCH RBC QN AUTO: 30.6 PG (ref 26–34)
MCHC RBC AUTO-ENTMCNC: 30.6 G/DL (ref 30–36.5)
MCV RBC AUTO: 100 FL (ref 80–99)
NRBC # BLD: 0 K/UL (ref 0–0.01)
NRBC BLD-RTO: 0 PER 100 WBC
PLATELET # BLD AUTO: 261 K/UL (ref 150–400)
PMV BLD AUTO: 9.6 FL (ref 8.9–12.9)
POTASSIUM SERPL-SCNC: 3.9 MMOL/L (ref 3.5–5.1)
RBC # BLD AUTO: 3.5 M/UL (ref 3.8–5.2)
SODIUM SERPL-SCNC: 139 MMOL/L (ref 136–145)
WBC # BLD AUTO: 5.9 K/UL (ref 3.6–11)

## 2023-01-01 PROCEDURE — 36415 COLL VENOUS BLD VENIPUNCTURE: CPT

## 2023-01-01 PROCEDURE — 85027 COMPLETE CBC AUTOMATED: CPT

## 2023-01-01 PROCEDURE — 94640 AIRWAY INHALATION TREATMENT: CPT

## 2023-01-01 PROCEDURE — 80048 BASIC METABOLIC PNL TOTAL CA: CPT

## 2023-01-01 PROCEDURE — 74011250637 HC RX REV CODE- 250/637: Performed by: STUDENT IN AN ORGANIZED HEALTH CARE EDUCATION/TRAINING PROGRAM

## 2023-01-01 PROCEDURE — 65270000029 HC RM PRIVATE

## 2023-01-01 PROCEDURE — 74011250637 HC RX REV CODE- 250/637: Performed by: HOSPITALIST

## 2023-01-01 PROCEDURE — 86140 C-REACTIVE PROTEIN: CPT

## 2023-01-01 PROCEDURE — 74011250636 HC RX REV CODE- 250/636: Performed by: HOSPITALIST

## 2023-01-01 PROCEDURE — 74011000250 HC RX REV CODE- 250: Performed by: HOSPITALIST

## 2023-01-01 RX ORDER — POLYETHYLENE GLYCOL 3350 17 G/17G
17 POWDER, FOR SOLUTION ORAL DAILY
Status: DISCONTINUED | OUTPATIENT
Start: 2023-01-02 | End: 2023-01-02

## 2023-01-01 RX ORDER — DIPHENHYDRAMINE HCL 25 MG
25 CAPSULE ORAL
Status: DISCONTINUED | OUTPATIENT
Start: 2023-01-01 | End: 2023-01-18 | Stop reason: HOSPADM

## 2023-01-01 RX ADMIN — DEXAMETHASONE SODIUM PHOSPHATE 8 MG: 4 INJECTION, SOLUTION INTRA-ARTICULAR; INTRALESIONAL; INTRAMUSCULAR; INTRAVENOUS; SOFT TISSUE at 13:55

## 2023-01-01 RX ADMIN — MORPHINE SULFATE 2 MG: 2 INJECTION, SOLUTION INTRAMUSCULAR; INTRAVENOUS at 01:21

## 2023-01-01 RX ADMIN — DIPHENHYDRAMINE HYDROCHLORIDE 25 MG: 25 CAPSULE ORAL at 20:02

## 2023-01-01 RX ADMIN — GABAPENTIN 400 MG: 400 CAPSULE ORAL at 22:49

## 2023-01-01 RX ADMIN — GABAPENTIN 400 MG: 400 CAPSULE ORAL at 17:42

## 2023-01-01 RX ADMIN — FOLIC ACID 1 MG: 1 TABLET ORAL at 06:30

## 2023-01-01 RX ADMIN — PANTOPRAZOLE SODIUM 40 MG: 40 TABLET, DELAYED RELEASE ORAL at 06:30

## 2023-01-01 RX ADMIN — BUDESONIDE AND FORMOTEROL FUMARATE DIHYDRATE 1 PUFF: 160; 4.5 AEROSOL RESPIRATORY (INHALATION) at 21:46

## 2023-01-01 RX ADMIN — GABAPENTIN 400 MG: 400 CAPSULE ORAL at 09:15

## 2023-01-01 RX ADMIN — OXYCODONE HYDROCHLORIDE 20 MG: 20 TABLET, FILM COATED, EXTENDED RELEASE ORAL at 20:01

## 2023-01-01 RX ADMIN — ENOXAPARIN SODIUM 40 MG: 100 INJECTION SUBCUTANEOUS at 09:15

## 2023-01-01 RX ADMIN — BUDESONIDE AND FORMOTEROL FUMARATE DIHYDRATE 1 PUFF: 160; 4.5 AEROSOL RESPIRATORY (INHALATION) at 05:59

## 2023-01-01 RX ADMIN — TIOTROPIUM BROMIDE INHALATION SPRAY 2 PUFF: 3.12 SPRAY, METERED RESPIRATORY (INHALATION) at 08:48

## 2023-01-01 RX ADMIN — DEXAMETHASONE SODIUM PHOSPHATE 8 MG: 4 INJECTION, SOLUTION INTRA-ARTICULAR; INTRALESIONAL; INTRAMUSCULAR; INTRAVENOUS; SOFT TISSUE at 22:49

## 2023-01-01 RX ADMIN — SODIUM CHLORIDE, PRESERVATIVE FREE 10 ML: 5 INJECTION INTRAVENOUS at 22:49

## 2023-01-01 RX ADMIN — SODIUM CHLORIDE, PRESERVATIVE FREE 10 ML: 5 INJECTION INTRAVENOUS at 05:05

## 2023-01-01 RX ADMIN — MORPHINE SULFATE 2 MG: 2 INJECTION, SOLUTION INTRAMUSCULAR; INTRAVENOUS at 13:55

## 2023-01-01 RX ADMIN — OXYCODONE HYDROCHLORIDE 20 MG: 20 TABLET, FILM COATED, EXTENDED RELEASE ORAL at 10:58

## 2023-01-01 RX ADMIN — MORPHINE SULFATE 2 MG: 2 INJECTION, SOLUTION INTRAMUSCULAR; INTRAVENOUS at 05:05

## 2023-01-01 RX ADMIN — MORPHINE SULFATE 2 MG: 2 INJECTION, SOLUTION INTRAMUSCULAR; INTRAVENOUS at 09:15

## 2023-01-01 RX ADMIN — ACETAMINOPHEN 650 MG: 325 TABLET ORAL at 01:55

## 2023-01-01 RX ADMIN — MORPHINE SULFATE 2 MG: 2 INJECTION, SOLUTION INTRAMUSCULAR; INTRAVENOUS at 17:42

## 2023-01-01 RX ADMIN — ACETAMINOPHEN 650 MG: 325 TABLET ORAL at 09:15

## 2023-01-01 RX ADMIN — SODIUM CHLORIDE, PRESERVATIVE FREE 10 ML: 5 INJECTION INTRAVENOUS at 13:55

## 2023-01-01 RX ADMIN — DEXAMETHASONE SODIUM PHOSPHATE 8 MG: 4 INJECTION, SOLUTION INTRA-ARTICULAR; INTRALESIONAL; INTRAMUSCULAR; INTRAVENOUS; SOFT TISSUE at 05:05

## 2023-01-01 NOTE — PROGRESS NOTES
Hospitalist Progress Note        Daily Progress Note: 1/1/2023 11:32 AM  Hospital Course     Gely Otero 78 y.o. female history of carcinoma of the lung with lobectomy in 1996, since then on follow-up with surveillance. She woke up this morning with severe lower extremity weakness and pain unable to stand up and walk. 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 She is recommended to follow-up with oncology, had MRI and PET scan scheduled end of January 2023. States the back pain started getting worse, its severe, given oxycodone 10 mg 3 times a day but not helping the pain. Its radiating into the legs, now with severe weakness. She is crying with pain worse with any movement. Due to the mass in the lumbar spine requested for CT of thoracic spine and lumbar spine, consult placed to radiation oncology. As she is not completely paralyzed, radiation oncology recommended to continue steroids at this time and follow-up. He is going to start radiation treatment on Tuesday if patient continues to be stable if not otherwise to contact on-call radiation oncology. Subjective:     Patient seen and examined at bedside. She continues to have significant lumbar pain. Unable to reposition herself. REVIEW OF SYSTEMS    Constitutional:  Negative for chills, fever and malaise/fatigue. HENT:  Negative for congestion and sore throat. Eyes:  Negative for blurred vision and double vision. Respiratory:  Negative for cough, sputum production, shortness of breath and wheezing. Cardiovascular:  Negative for chest pain, palpitations, orthopnea and leg swelling. Gastrointestinal:  Negative for abdominal pain, blood in stool, constipation, diarrhea, nausea and vomiting. Genitourinary:  Negative for dysuria, flank pain, frequency, hematuria and urgency.    Musculoskeletal:  + back pain, + joint pain, + myalgias  Skin:  Negative for itching and rash. Neurological:  Negative for dizziness, speech change, seizures and headaches. Psychiatric/Behavioral:  Negative for depression. The patient is not nervous/anxious. Objective:     Current Facility-Administered Medications   Medication Dose Route Frequency    albuterol-ipratropium (DUO-NEB) 2.5 MG-0.5 MG/3 ML  3 mL Nebulization Q4H PRN    budesonide-formoteroL (SYMBICORT) 160-4.5 mcg/actuation HFA inhaler 1 Puff  1 Puff Inhalation BID RT    And    tiotropium bromide (SPIRIVA RESPIMAT) 2.5 mcg /actuation  2 Puff Inhalation DAILY    albuterol (PROVENTIL HFA, VENTOLIN HFA, PROAIR HFA) inhaler 2 Puff  2 Puff Inhalation Z0E PRN    folic acid (FOLVITE) tablet 1 mg  1 mg Oral ACB    gabapentin (NEURONTIN) capsule 400 mg  400 mg Oral TID    pantoprazole (PROTONIX) tablet 40 mg  40 mg Oral ACB    sodium chloride (NS) flush 5-40 mL  5-40 mL IntraVENous Q8H    sodium chloride (NS) flush 5-40 mL  5-40 mL IntraVENous PRN    acetaminophen (TYLENOL) tablet 650 mg  650 mg Oral Q6H PRN    Or    acetaminophen (TYLENOL) suppository 650 mg  650 mg Rectal Q6H PRN    ondansetron (ZOFRAN ODT) tablet 4 mg  4 mg Oral Q6H PRN    Or    ondansetron (ZOFRAN) injection 4 mg  4 mg IntraVENous Q6H PRN    enoxaparin (LOVENOX) injection 40 mg  40 mg SubCUTAneous DAILY    oxyCODONE ER (OxyCONTIN) tablet 20 mg  20 mg Oral Q12H    dexamethasone (DECADRON) 4 mg/mL injection 8 mg  8 mg IntraVENous Q8H    morphine injection 2 mg  2 mg IntraVENous Q4H PRN       Visit Vitals  BP (!) 157/59 (BP 1 Location: Left upper arm)   Pulse 68   Temp 98.3 °F (36.8 °C)   Resp 18   Ht 5' 6\" (1.676 m)   Wt 81.6 kg (180 lb)   SpO2 96%   BMI 29.05 kg/m²    O2 Flow Rate (L/min): 2 l/min O2 Device: Nasal cannula    Temp (24hrs), Av.9 °F (36.6 °C), Min:97.2 °F (36.2 °C), Max:98.3 °F (36.8 °C)      No intake/output data recorded. 1901 -  0700  In: 1925 [P.O.:550;  I.V.:1375]  Out: 3100 [[facility]]    PHYSICAL EXAM:    Constitutional: No acute distress  Skin: Extremities and face reveal no rashes. HEENT: Sclerae anicteric. PERRL. No oral ulcers. The neck is supple and no masses. Cardiovascular: Regular rate and rhythm. +S1/S2. No murmur or gallop. No JVD. Respiratory:  Clear breath sounds bilaterally with no wheezes, rales, or rhonchi. GI: Abdomen nondistended, soft, and nontender. Normal active bowel sounds. Rectal: Deferred   Musculoskeletal: Difficulty lifting lower extremities, right > left s/t pain and weakness. No pitting edema of the lower legs. Neurological:  Patient is A&Ox3. Cranial nerves II-XII grossly intact  Psychiatric: Mood and affect appropriate    Data Review    Recent Results (from the past 24 hour(s))   GLUCOSE, POC    Collection Time: 12/31/22  5:22 PM   Result Value Ref Range    Glucose (POC) 145 (H) 65 - 100 mg/dL    Performed by [facility]        CT SPINE LUMB WO CONT   Final Result   1. No evidence of acute fracture. 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   2. 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 CT SPINE [facility] WO CONT   Final Result   1. No evidence of acute fracture. Active Problems:    Acute back pain (12/30/2022)      Lower extremity weakness (12/30/2022)      Gait disturbance (12/30/2022)      Assessment/Plan:     1. Gait disturbance with lower extremity weakness  - Most likely secondary to cord compression and tumor  - Continue IV dexamethasone as per radiation oncology recommendations, 10 mg of 1 dose now then 8 mg every 8 hours and monitor closely. - Frequent repositioning     2. Severe back pain secondary to suspected spinal mass  - Increased the dose of the oxycodone to long-acting OxyContin 20 mg twice a day with IV morphine breakthrough pain relief.     3. History of carcinoma of the lung adenocarcinoma with lobectomy with suspected recurrence metastatic at this time.      4. Chronic obstructive pulmonary disease  - Mild with no signs of exacerbation on DuoNeb which we will continue      DVT Prophylaxis: Lovenox  GI Prophylaxis: Protonix   Code Status: Full Code  POA/NOK:    Disposition and discharge barriers:   Pain control  IV steroid  Radiation oncology to arrange radiation Tuesday    Care Plan discussed with: nursing, patient, and daughter at bedside  _____________________________________________________________________________  Time spent in direct care including coordination of service, review of data and examination: > 35 minutes    ______________________________________________________________________________    Roe Fritz PA-C

## 2023-01-01 NOTE — ROUTINE PROCESS
Bedside shift change report given to 1540 Araceli Lemus (oncoming nurse) by Ruma SIGALA (offgoing nurse). Report included the following information SBAR.

## 2023-01-01 NOTE — PROGRESS NOTES
Problem: Falls - Risk of  Goal: *Absence of Falls  Description: Document Adan Fall Risk and appropriate interventions in the flowsheet.   Outcome: Progressing Towards Goal  Note: Fall Risk Interventions:                                Problem: Pain  Goal: *Control of Pain  Outcome: Progressing Towards Goal     Problem: General Medical Care Plan  Goal: *Vital signs within specified parameters  Outcome: Progressing Towards Goal  Goal: *Labs within defined limits  Outcome: Progressing Towards Goal  Goal: *Absence of infection signs and symptoms  Outcome: Progressing Towards Goal  Goal: *Optimal pain control at patient's stated goal  Outcome: Progressing Towards Goal  Goal: *Skin integrity maintained  Outcome: Progressing Towards Goal

## 2023-01-01 NOTE — PROGRESS NOTES
PT order received and acknowledged; however after chart review and speaking with Jona Castañeda RN, pt has a new found mass at L3 causing cord compression due to metastasis from her lung CA. Pt is scheduled for a procedure with radiation oncology on Tuesday. At this time, pt is having severe pain with any movement even with morphine every 4 hours. Will continue to follow and evaluate when pt is medically appropriate.

## 2023-01-02 PROCEDURE — 77010033678 HC OXYGEN DAILY

## 2023-01-02 PROCEDURE — 74011000250 HC RX REV CODE- 250: Performed by: HOSPITALIST

## 2023-01-02 PROCEDURE — 74011250637 HC RX REV CODE- 250/637: Performed by: HOSPITALIST

## 2023-01-02 PROCEDURE — 74011250637 HC RX REV CODE- 250/637: Performed by: STUDENT IN AN ORGANIZED HEALTH CARE EDUCATION/TRAINING PROGRAM

## 2023-01-02 PROCEDURE — 94761 N-INVAS EAR/PLS OXIMETRY MLT: CPT

## 2023-01-02 PROCEDURE — 65270000029 HC RM PRIVATE

## 2023-01-02 PROCEDURE — 74011250636 HC RX REV CODE- 250/636: Performed by: HOSPITALIST

## 2023-01-02 PROCEDURE — 94640 AIRWAY INHALATION TREATMENT: CPT

## 2023-01-02 RX ORDER — POLYETHYLENE GLYCOL 3350 17 G/17G
17 POWDER, FOR SOLUTION ORAL
Status: DISCONTINUED | OUTPATIENT
Start: 2023-01-02 | End: 2023-01-18 | Stop reason: HOSPADM

## 2023-01-02 RX ORDER — TRAMADOL HYDROCHLORIDE 50 MG/1
50 TABLET ORAL
Status: COMPLETED | OUTPATIENT
Start: 2023-01-02 | End: 2023-01-02

## 2023-01-02 RX ORDER — CYCLOBENZAPRINE HCL 10 MG
10 TABLET ORAL
Status: DISCONTINUED | OUTPATIENT
Start: 2023-01-02 | End: 2023-01-18 | Stop reason: HOSPADM

## 2023-01-02 RX ORDER — HYDROXYZINE PAMOATE 25 MG/1
25 CAPSULE ORAL
Status: DISCONTINUED | OUTPATIENT
Start: 2023-01-02 | End: 2023-01-18 | Stop reason: HOSPADM

## 2023-01-02 RX ADMIN — SODIUM CHLORIDE, PRESERVATIVE FREE 10 ML: 5 INJECTION INTRAVENOUS at 05:12

## 2023-01-02 RX ADMIN — GABAPENTIN 400 MG: 400 CAPSULE ORAL at 21:50

## 2023-01-02 RX ADMIN — ENOXAPARIN SODIUM 40 MG: 100 INJECTION SUBCUTANEOUS at 08:52

## 2023-01-02 RX ADMIN — CYCLOBENZAPRINE 10 MG: 10 TABLET, FILM COATED ORAL at 10:51

## 2023-01-02 RX ADMIN — TIOTROPIUM BROMIDE INHALATION SPRAY 2 PUFF: 3.12 SPRAY, METERED RESPIRATORY (INHALATION) at 09:13

## 2023-01-02 RX ADMIN — MORPHINE SULFATE 2 MG: 2 INJECTION, SOLUTION INTRAMUSCULAR; INTRAVENOUS at 01:41

## 2023-01-02 RX ADMIN — FOLIC ACID 1 MG: 1 TABLET ORAL at 05:14

## 2023-01-02 RX ADMIN — MORPHINE SULFATE 2 MG: 2 INJECTION, SOLUTION INTRAMUSCULAR; INTRAVENOUS at 10:50

## 2023-01-02 RX ADMIN — DEXAMETHASONE SODIUM PHOSPHATE 8 MG: 4 INJECTION, SOLUTION INTRA-ARTICULAR; INTRALESIONAL; INTRAMUSCULAR; INTRAVENOUS; SOFT TISSUE at 05:12

## 2023-01-02 RX ADMIN — PANTOPRAZOLE SODIUM 40 MG: 40 TABLET, DELAYED RELEASE ORAL at 05:14

## 2023-01-02 RX ADMIN — OXYCODONE HYDROCHLORIDE 20 MG: 20 TABLET, FILM COATED, EXTENDED RELEASE ORAL at 20:14

## 2023-01-02 RX ADMIN — GABAPENTIN 400 MG: 400 CAPSULE ORAL at 08:52

## 2023-01-02 RX ADMIN — TRAMADOL HYDROCHLORIDE 50 MG: 50 TABLET, COATED ORAL at 14:19

## 2023-01-02 RX ADMIN — SODIUM CHLORIDE, PRESERVATIVE FREE 10 ML: 5 INJECTION INTRAVENOUS at 14:21

## 2023-01-02 RX ADMIN — OXYCODONE HYDROCHLORIDE 20 MG: 20 TABLET, FILM COATED, EXTENDED RELEASE ORAL at 08:52

## 2023-01-02 RX ADMIN — DIPHENHYDRAMINE HYDROCHLORIDE 25 MG: 25 CAPSULE ORAL at 01:58

## 2023-01-02 RX ADMIN — SODIUM CHLORIDE, PRESERVATIVE FREE 10 ML: 5 INJECTION INTRAVENOUS at 21:50

## 2023-01-02 RX ADMIN — GABAPENTIN 400 MG: 400 CAPSULE ORAL at 18:38

## 2023-01-02 RX ADMIN — MORPHINE SULFATE 2 MG: 2 INJECTION, SOLUTION INTRAMUSCULAR; INTRAVENOUS at 05:24

## 2023-01-02 RX ADMIN — BUDESONIDE AND FORMOTEROL FUMARATE DIHYDRATE 1 PUFF: 160; 4.5 AEROSOL RESPIRATORY (INHALATION) at 09:13

## 2023-01-02 RX ADMIN — MORPHINE SULFATE 2 MG: 2 INJECTION, SOLUTION INTRAMUSCULAR; INTRAVENOUS at 14:55

## 2023-01-02 RX ADMIN — DEXAMETHASONE SODIUM PHOSPHATE 8 MG: 4 INJECTION, SOLUTION INTRA-ARTICULAR; INTRALESIONAL; INTRAMUSCULAR; INTRAVENOUS; SOFT TISSUE at 21:50

## 2023-01-02 RX ADMIN — DEXAMETHASONE SODIUM PHOSPHATE 8 MG: 4 INJECTION, SOLUTION INTRA-ARTICULAR; INTRALESIONAL; INTRAMUSCULAR; INTRAVENOUS; SOFT TISSUE at 14:19

## 2023-01-02 RX ADMIN — BUDESONIDE AND FORMOTEROL FUMARATE DIHYDRATE 1 PUFF: 160; 4.5 AEROSOL RESPIRATORY (INHALATION) at 19:52

## 2023-01-02 RX ADMIN — MORPHINE SULFATE 2 MG: 2 INJECTION, SOLUTION INTRAMUSCULAR; INTRAVENOUS at 18:35

## 2023-01-02 NOTE — PROGRESS NOTES
From: Radha Hatch  To: Hilario Valladares MD  Sent: 8/18/2019 4:46 PM CDT  Subject: Medication Question    Dear Dr. Valladares,    Would you be able to write a prescription for me for [place] to last me until my appointment with you, (two weeks) and leave it at the  for me to ?    I was given a large amount of liquid pain medication for the first time they scheduled my tonsillectomy which I picked up before the operation was to take place. When they canceled the surgery, I dumped the medication and decided not to reschedule.    Now that I had tonsillitis and the tonsillectomy, Dr. López's office can't keep my pain medication at the rate I need to take it because of the previous prescription.    I was in the ER on Saturday, but they gave me 18 tablets, and I need to take 2 every 4 hours or I can't use my throat, even to drink water.    Please call me for further discussion.    [phone]    Thank You.    Radha Hatch   Hospitalist Progress Note        Daily Progress Note: 1/2/2023 11:32 AM  Hospital Course     Gely Otero 78 y.o. female history of carcinoma of the lung with lobectomy in 1996, since then on follow-up with surveillance. She woke up this morning with severe lower extremity weakness and pain unable to stand up and walk. 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 She is recommended to follow-up with oncology, had MRI and PET scan scheduled end of January 2023. States the back pain started getting worse, its severe, given oxycodone 10 mg 3 times a day but not helping the pain. Its radiating into the legs, now with severe weakness. She is crying with pain worse with any movement. Due to the mass in the lumbar spine requested for CT of thoracic spine and lumbar spine, consult placed to radiation oncology. As she is not completely paralyzed, radiation oncology recommended to continue steroids at this time and follow-up. He is going to start radiation treatment on Tuesday if patient continues to be stable if not otherwise to contact on-call radiation oncology. Subjective:     Patient seen and examined at bedside. She continues to have significant lumbar pain. Now reports lower extremity muscle spasms. REVIEW OF SYSTEMS    Constitutional:  Negative for chills, fever and malaise/fatigue. HENT:  Negative for congestion and sore throat. Eyes:  Negative for blurred vision and double vision. Respiratory:  Negative for cough, sputum production, shortness of breath and wheezing. Cardiovascular:  Negative for chest pain, palpitations, orthopnea and leg swelling. Gastrointestinal:  Negative for abdominal pain, blood in stool, constipation, diarrhea, nausea and vomiting. Genitourinary:  Negative for dysuria, flank pain, frequency, hematuria and urgency.    Musculoskeletal:  + back pain, + joint pain, + myalgias  Skin:  Negative for itching and rash. Neurological:  Negative for dizziness, speech change, seizures and headaches. Psychiatric/Behavioral:  Negative for depression. The patient is not nervous/anxious.       Objective:     Current Facility-Administered Medications   Medication Dose Route Frequency    cyclobenzaprine (FLEXERIL) tablet 10 mg  10 mg Oral TID PRN    polyethylene glycol (MIRALAX) packet 17 g  17 g Oral QHS    diphenhydrAMINE (BENADRYL) capsule 25 mg  25 mg Oral Q6H PRN    albuterol-ipratropium (DUO-NEB) 2.5 MG-0.5 MG/3 ML  3 mL Nebulization Q4H PRN    budesonide-formoteroL (SYMBICORT) 160-4.5 mcg/actuation HFA inhaler 1 Puff  1 Puff Inhalation BID RT    And    tiotropium bromide (SPIRIVA RESPIMAT) 2.5 mcg /actuation  2 Puff Inhalation DAILY    albuterol (PROVENTIL HFA, VENTOLIN HFA, PROAIR HFA) inhaler 2 Puff  2 Puff Inhalation I3T PRN    folic acid (FOLVITE) tablet 1 mg  1 mg Oral ACB    gabapentin (NEURONTIN) capsule 400 mg  400 mg Oral TID    pantoprazole (PROTONIX) tablet 40 mg  40 mg Oral ACB    sodium chloride (NS) flush 5-40 mL  5-40 mL IntraVENous Q8H    sodium chloride (NS) flush 5-40 mL  5-40 mL IntraVENous PRN    acetaminophen (TYLENOL) tablet 650 mg  650 mg Oral Q6H PRN    Or    acetaminophen (TYLENOL) suppository 650 mg  650 mg Rectal Q6H PRN    ondansetron (ZOFRAN ODT) tablet 4 mg  4 mg Oral Q6H PRN    Or    ondansetron (ZOFRAN) injection 4 mg  4 mg IntraVENous Q6H PRN    enoxaparin (LOVENOX) injection 40 mg  40 mg SubCUTAneous DAILY    oxyCODONE ER (OxyCONTIN) tablet 20 mg  20 mg Oral Q12H    dexamethasone (DECADRON) 4 mg/mL injection 8 mg  8 mg IntraVENous Q8H    morphine injection 2 mg  2 mg IntraVENous Q4H PRN       Visit Vitals  BP (!) 152/73 (BP 1 Location: Left upper arm, BP Patient Position: At rest;Supine)   Pulse 67   Temp 97.8 °F (36.6 °C)   Resp 18   Ht 5' 6\" (1.676 m)   Wt 81.6 kg (180 lb)   SpO2 96%   BMI 29.05 kg/m²    O2 Flow Rate (L/min): 2 l/min O2 Device: Nasal cannula    Temp (24hrs), Av.9 °F (36.6 °C), Min:97.7 °F (36.5 °C), Max:98.3 °F (36.8 °C)      701 - 1900  In: -   Out: 1000 [Urine:1000]  1901 - 700  In: 250 [P.O.:250]  Out: 2600 [Urine:2600]    PHYSICAL EXAM:    Constitutional: No acute distress  Skin: Extremities and face reveal no rashes. HEENT: Sclerae anicteric. PERRL. No oral ulcers. The neck is supple and no masses. Cardiovascular: Regular rate and rhythm. +S1/S2. No murmur or gallop. No JVD. Respiratory:  Clear breath sounds bilaterally with no wheezes, rales, or rhonchi. GI: Abdomen nondistended, soft, and nontender. Normal active bowel sounds. Rectal: Deferred   Musculoskeletal: Difficulty lifting lower extremities, right > left s/t pain and weakness. No pitting edema of the lower legs. Neurological:  Patient is A&Ox3. Cranial nerves II-XII grossly intact  Psychiatric: Mood and affect appropriate    Data Review    No results found for this or any previous visit (from the past 24 hour(s)). CT SPINE LUMB WO CONT   Final Result   1. No evidence of acute fracture. 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   2. 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 CT SPINE [facility] WO CONT   Final Result   1. No evidence of acute fracture. Active Problems:    Acute back pain (2022)      Lower extremity weakness (2022)      Gait disturbance (2022)      Assessment/Plan:     1. Gait disturbance with lower extremity weakness  - Most likely secondary to cord compression and tumor  - Continue IV dexamethasone as per radiation oncology recommendations, 10 mg of 1 dose now then 8 mg every 8 hours and monitor closely. - Frequent repositioning     2.  Severe back pain secondary to suspected spinal mass  - Increased the dose of the oxycodone to long-acting OxyContin 20 mg twice a day with IV morphine breakthrough pain relief. - Mass at L3 that was clearly present 2 weeks ago on MRI and appears to have progressed causing increased pain and possibly lower extremity weakness.  - Radiation oncology consult  - Scheduled for  dose of 30 Gy in 10 fractions per Rad Onc on 01/03/2023    3. History of carcinoma of the lung adenocarcinoma with lobectomy with suspected recurrence metastatic at this time.      4. Chronic obstructive pulmonary disease  - Mild with no signs of exacerbation on DuoNeb which we will continue      DVT Prophylaxis: Lovenox  GI Prophylaxis: Protonix   Code Status: Full Code  POA/NOK:    Disposition and discharge barriers:   Pain control  IV steroid  Radiation oncology to arrange inpatient treatment on Tuesday    Care Plan discussed with: nursing, patient, and daughter at bedside  _____________________________________________________________________________  Time spent in direct care including coordination of service, review of data and examination: > 35 minutes    ______________________________________________________________________________    Jas Duarte PA-C

## 2023-01-02 NOTE — PROGRESS NOTES
Problem: Falls - Risk of  Goal: *Absence of Falls  Description: Document Jocelyn Hilario Fall Risk and appropriate interventions in the flowsheet. Outcome: Progressing Towards Goal  Note: Fall Risk Interventions:                                Problem: Patient Education: Go to Patient Education Activity  Goal: Patient/Family Education  Outcome: Progressing Towards Goal     Problem: Pain  Goal: *Control of Pain  Outcome: Progressing Towards Goal     Problem: Patient Education: Go to Patient Education Activity  Goal: Patient/Family Education  Outcome: Progressing Towards Goal     Problem: General Medical Care Plan  Goal: *Vital signs within specified parameters  Outcome: Progressing Towards Goal  Goal: *Labs within defined limits  Outcome: Progressing Towards Goal  Goal: *Absence of infection signs and symptoms  Outcome: Progressing Towards Goal  Goal: *Optimal pain control at patient's stated goal  Outcome: Progressing Towards Goal  Goal: *Skin integrity maintained  Outcome: Progressing Towards Goal  Goal: *Fluid volume balance  Outcome: Progressing Towards Goal  Goal: *Optimize nutritional status  Outcome: Progressing Towards Goal  Goal: *Anxiety reduced or absent  Outcome: Progressing Towards Goal  Goal: *Progressive mobility and function (eg: ADL's)  Outcome: Progressing Towards Goal     Problem: Patient Education: Go to Patient Education Activity  Goal: Patient/Family Education  Outcome: Progressing Towards Goal     Problem: Patient Education: Go to Patient Education Activity  Goal: Patient/Family Education  Outcome: Progressing Towards Goal     Problem: Pressure Injury - Risk of  Goal: *Prevention of pressure injury  Description: Document Al Scale and appropriate interventions in the flowsheet.   Outcome: Progressing Towards Goal  Note: Pressure Injury Interventions:       Moisture Interventions: Absorbent underpads, Apply protective barrier, creams and emollients    Activity Interventions: Increase time out of bed, PT/OT evaluation    Mobility Interventions: HOB 30 degrees or less    Nutrition Interventions: Document food/fluid/supplement intake, Offer support with meals,snacks and hydration                     Problem: Patient Education: Go to Patient Education Activity  Goal: Patient/Family Education  Outcome: Progressing Towards Goal

## 2023-01-03 ENCOUNTER — APPOINTMENT (OUTPATIENT)
Dept: RADIATION THERAPY | Age: 83
DRG: 055 | End: 2023-01-03
Payer: MEDICARE

## 2023-01-03 PROCEDURE — DPYC7ZZ CONTACT RADIATION OF OTHER BONE: ICD-10-PCS | Performed by: RADIOLOGY

## 2023-01-03 PROCEDURE — 94640 AIRWAY INHALATION TREATMENT: CPT

## 2023-01-03 PROCEDURE — 74011250637 HC RX REV CODE- 250/637: Performed by: STUDENT IN AN ORGANIZED HEALTH CARE EDUCATION/TRAINING PROGRAM

## 2023-01-03 PROCEDURE — 77412 RADIATION TX DELIVERY LVL 3: CPT

## 2023-01-03 PROCEDURE — 74011250637 HC RX REV CODE- 250/637: Performed by: HOSPITALIST

## 2023-01-03 PROCEDURE — 77290 THER RAD SIMULAJ FIELD CPLX: CPT

## 2023-01-03 PROCEDURE — 65270000029 HC RM PRIVATE

## 2023-01-03 PROCEDURE — 94761 N-INVAS EAR/PLS OXIMETRY MLT: CPT

## 2023-01-03 PROCEDURE — 77307 TELETHX ISODOSE PLAN CPLX: CPT

## 2023-01-03 PROCEDURE — 77417 THER RADIOLOGY PORT IMAGE(S): CPT

## 2023-01-03 PROCEDURE — 77334 RADIATION TREATMENT AID(S): CPT

## 2023-01-03 PROCEDURE — 74011250636 HC RX REV CODE- 250/636: Performed by: HOSPITALIST

## 2023-01-03 PROCEDURE — 77010033678 HC OXYGEN DAILY

## 2023-01-03 PROCEDURE — 74011000250 HC RX REV CODE- 250: Performed by: HOSPITALIST

## 2023-01-03 RX ORDER — DOCUSATE SODIUM 100 MG/1
100 CAPSULE, LIQUID FILLED ORAL DAILY
Status: DISCONTINUED | OUTPATIENT
Start: 2023-01-04 | End: 2023-01-18 | Stop reason: HOSPADM

## 2023-01-03 RX ADMIN — MORPHINE SULFATE 2 MG: 2 INJECTION, SOLUTION INTRAMUSCULAR; INTRAVENOUS at 15:20

## 2023-01-03 RX ADMIN — DEXAMETHASONE SODIUM PHOSPHATE 8 MG: 4 INJECTION, SOLUTION INTRA-ARTICULAR; INTRALESIONAL; INTRAMUSCULAR; INTRAVENOUS; SOFT TISSUE at 22:00

## 2023-01-03 RX ADMIN — ACETAMINOPHEN 650 MG: 325 TABLET ORAL at 16:47

## 2023-01-03 RX ADMIN — GABAPENTIN 400 MG: 400 CAPSULE ORAL at 22:01

## 2023-01-03 RX ADMIN — DEXAMETHASONE SODIUM PHOSPHATE 8 MG: 4 INJECTION, SOLUTION INTRA-ARTICULAR; INTRALESIONAL; INTRAMUSCULAR; INTRAVENOUS; SOFT TISSUE at 16:47

## 2023-01-03 RX ADMIN — CYCLOBENZAPRINE 10 MG: 10 TABLET, FILM COATED ORAL at 09:07

## 2023-01-03 RX ADMIN — SODIUM CHLORIDE, PRESERVATIVE FREE 10 ML: 5 INJECTION INTRAVENOUS at 22:01

## 2023-01-03 RX ADMIN — SODIUM CHLORIDE, PRESERVATIVE FREE 10 ML: 5 INJECTION INTRAVENOUS at 05:30

## 2023-01-03 RX ADMIN — FOLIC ACID 1 MG: 1 TABLET ORAL at 07:16

## 2023-01-03 RX ADMIN — GABAPENTIN 400 MG: 400 CAPSULE ORAL at 09:07

## 2023-01-03 RX ADMIN — MORPHINE SULFATE 2 MG: 2 INJECTION, SOLUTION INTRAMUSCULAR; INTRAVENOUS at 06:23

## 2023-01-03 RX ADMIN — BUDESONIDE AND FORMOTEROL FUMARATE DIHYDRATE 1 PUFF: 160; 4.5 AEROSOL RESPIRATORY (INHALATION) at 08:23

## 2023-01-03 RX ADMIN — SODIUM CHLORIDE, PRESERVATIVE FREE 10 ML: 5 INJECTION INTRAVENOUS at 16:57

## 2023-01-03 RX ADMIN — PANTOPRAZOLE SODIUM 40 MG: 40 TABLET, DELAYED RELEASE ORAL at 07:16

## 2023-01-03 RX ADMIN — TIOTROPIUM BROMIDE INHALATION SPRAY 2 PUFF: 3.12 SPRAY, METERED RESPIRATORY (INHALATION) at 09:11

## 2023-01-03 RX ADMIN — MORPHINE SULFATE 2 MG: 2 INJECTION, SOLUTION INTRAMUSCULAR; INTRAVENOUS at 00:21

## 2023-01-03 RX ADMIN — OXYCODONE HYDROCHLORIDE 20 MG: 20 TABLET, FILM COATED, EXTENDED RELEASE ORAL at 09:07

## 2023-01-03 RX ADMIN — DEXAMETHASONE SODIUM PHOSPHATE 8 MG: 4 INJECTION, SOLUTION INTRA-ARTICULAR; INTRALESIONAL; INTRAMUSCULAR; INTRAVENOUS; SOFT TISSUE at 05:30

## 2023-01-03 RX ADMIN — BUDESONIDE AND FORMOTEROL FUMARATE DIHYDRATE 1 PUFF: 160; 4.5 AEROSOL RESPIRATORY (INHALATION) at 19:25

## 2023-01-03 RX ADMIN — GABAPENTIN 400 MG: 400 CAPSULE ORAL at 16:47

## 2023-01-03 RX ADMIN — LACTULOSE 30 ML: 20 SOLUTION ORAL at 20:28

## 2023-01-03 RX ADMIN — OXYCODONE HYDROCHLORIDE 20 MG: 20 TABLET, FILM COATED, EXTENDED RELEASE ORAL at 20:28

## 2023-01-03 RX ADMIN — MORPHINE SULFATE 2 MG: 2 INJECTION, SOLUTION INTRAMUSCULAR; INTRAVENOUS at 10:57

## 2023-01-03 RX ADMIN — ENOXAPARIN SODIUM 40 MG: 100 INJECTION SUBCUTANEOUS at 09:08

## 2023-01-03 NOTE — PROGRESS NOTES
CM met with the patient at the bedside along with her daughters to discuss discharge plan. They are anxiously awaiting to speak with the Doctor regarding treatment plan, etc. CM explained role and discussed possible discharge needs. They stated they are unsure at this time but will follow up with CM once they have a plan in place.

## 2023-01-03 NOTE — PROGRESS NOTES
Hospitalist Progress Note        Daily Progress Note: 1/3/2023 11:32 AM  Hospital Course     Gely Otero 78 y.o. female history of carcinoma of the lung with lobectomy in 1996, since then on follow-up with surveillance. She woke up this morning with severe lower extremity weakness and pain unable to stand up and walk. 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 She is recommended to follow-up with oncology, Dr. Lencho Garcia, had MRI and PET scan scheduled January 2023. States the back pain started getting worse, its severe, given oxycodone 10 mg 3 times a day but not helping the pain. Its radiating into the legs, now with severe weakness. She is crying with pain worse with any movement. Due to the mass in the lumbar spine requested for CT of thoracic spine and lumbar spine, consult placed to radiation oncology. As she is not completely paralyzed, radiation oncology recommended to continue steroids at this time and follow-up. Dr. Eve Malhotra, rad/onc, to start radiation treatment on 01/03. Oncology consult. Continue pain management inpatient for now. Subjective:     Patient seen and examined at bedside. She continues to have significant lumbar pain despite frequent turns and pain medications. Scheduled for radiation today. REVIEW OF SYSTEMS    Constitutional:  Negative for chills, fever and malaise/fatigue. HENT:  Negative for congestion and sore throat. Eyes:  Negative for blurred vision and double vision. Respiratory:  Negative for cough, sputum production, shortness of breath and wheezing. Cardiovascular:  Negative for chest pain, palpitations, orthopnea and leg swelling. Gastrointestinal:  Negative for abdominal pain, blood in stool, constipation, diarrhea, nausea and vomiting. Genitourinary:  Negative for dysuria, flank pain, frequency, hematuria and urgency. Musculoskeletal:  + back pain, + joint pain, + myalgias  Skin:  Negative for itching and rash. Neurological:  Negative for dizziness, speech change, seizures and headaches. Psychiatric/Behavioral:  Negative for depression. The patient is not nervous/anxious.       Objective:     Current Facility-Administered Medications   Medication Dose Route Frequency    cyclobenzaprine (FLEXERIL) tablet 10 mg  10 mg Oral TID PRN    polyethylene glycol (MIRALAX) packet 17 g  17 g Oral QHS    hydrOXYzine pamoate (VISTARIL) capsule 25 mg  25 mg Oral TID PRN    diphenhydrAMINE (BENADRYL) capsule 25 mg  25 mg Oral Q6H PRN    albuterol-ipratropium (DUO-NEB) 2.5 MG-0.5 MG/3 ML  3 mL Nebulization Q4H PRN    budesonide-formoteroL (SYMBICORT) 160-4.5 mcg/actuation HFA inhaler 1 Puff  1 Puff Inhalation BID RT    And    tiotropium bromide (SPIRIVA RESPIMAT) 2.5 mcg /actuation  2 Puff Inhalation DAILY    albuterol (PROVENTIL HFA, VENTOLIN HFA, PROAIR HFA) inhaler 2 Puff  2 Puff Inhalation S5U PRN    folic acid (FOLVITE) tablet 1 mg  1 mg Oral ACB    gabapentin (NEURONTIN) capsule 400 mg  400 mg Oral TID    pantoprazole (PROTONIX) tablet 40 mg  40 mg Oral ACB    sodium chloride (NS) flush 5-40 mL  5-40 mL IntraVENous Q8H    sodium chloride (NS) flush 5-40 mL  5-40 mL IntraVENous PRN    acetaminophen (TYLENOL) tablet 650 mg  650 mg Oral Q6H PRN    Or    acetaminophen (TYLENOL) suppository 650 mg  650 mg Rectal Q6H PRN    ondansetron (ZOFRAN ODT) tablet 4 mg  4 mg Oral Q6H PRN    Or    ondansetron (ZOFRAN) injection 4 mg  4 mg IntraVENous Q6H PRN    enoxaparin (LOVENOX) injection 40 mg  40 mg SubCUTAneous DAILY    oxyCODONE ER (OxyCONTIN) tablet 20 mg  20 mg Oral Q12H    dexamethasone (DECADRON) 4 mg/mL injection 8 mg  8 mg IntraVENous Q8H    morphine injection 2 mg  2 mg IntraVENous Q4H PRN       Visit Vitals  BP (!) 144/70   Pulse 83   Temp 97.9 °F (36.6 °C)   Resp 18   Ht 5' 6\" (1.676 m)   Wt 81.6 kg (180 lb)   SpO2 95%   BMI 29.05 kg/m² O2 Flow Rate (L/min): 2 l/min O2 Device: Nasal cannula    Temp (24hrs), Av.1 °F (36.7 °C), Min:97.6 °F (36.4 °C), Max:98.4 °F (36.9 °C)      No intake/output data recorded.  1901 -  0700  In: -   Out: 2750 [Urine:2750]    PHYSICAL EXAM:    Constitutional: No acute distress  Skin: Extremities and face reveal no rashes. HEENT: Sclerae anicteric. PERRL. No oral ulcers. The neck is supple and no masses. Cardiovascular: Regular rate and rhythm. +S1/S2. No murmur or gallop. No JVD. Respiratory:  Clear breath sounds bilaterally with no wheezes, rales, or rhonchi. GI: Abdomen nondistended, soft, and nontender. Normal active bowel sounds. Rectal: Deferred   Musculoskeletal: Difficulty lifting lower extremities, right > left s/t pain and weakness. No pitting edema of the lower legs. Neurological:  Patient is A&Ox3. Cranial nerves II-XII grossly intact  Psychiatric: Mood and affect appropriate    Data Review    No results found for this or any previous visit (from the past 24 hour(s)). CT SPINE LUMB WO CONT   Final Result   1. No evidence of acute fracture. 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   2. 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 CT [facility] WO CONT   Final Result   1. No evidence of acute fracture. Active Problems:    Acute back pain (2022)      Lower extremity weakness (2022)      Gait disturbance (2022)      Assessment/Plan:     1. Gait disturbance with lower extremity weakness  - Most likely secondary to cord compression and tumor  - Continue IV dexamethasone as per radiation oncology recommendations, 10 mg of 1 dose now then 8 mg every 8 hours and monitor closely. - Frequent repositioning     2.  Severe back pain secondary to suspected spinal mass  - Increased the dose of the oxycodone to long-acting OxyContin 20 mg twice a day with IV morphine breakthrough pain relief. - Mass at L3 that was clearly present 2 weeks ago on MRI and appears to have progressed causing increased pain and possibly lower extremity weakness.  - Radiation oncology consult  - Scheduled for  dose of 30 Gy in 10 fractions per Rad Onc on 01/03/2023    3. History of carcinoma of the lung adenocarcinoma with lobectomy with suspected recurrence metastatic at this time  - Oncology consult      4. COPD  - Mild with no signs of exacerbation on DuoNeb which we will continue      DVT Prophylaxis: Lovenox  GI Prophylaxis: Protonix   Code Status: Full Code  POA/NOK:    Disposition and discharge barriers:   Pain control  IV steroid  Radiation oncology to start treatment today  Oncology consult     Care Plan discussed with: nursing, patient, and daughter at bedside. Also discussed with Dr. Mae Ennis.    _____________________________________________________________________________  Time spent in direct care including coordination of service, review of data and examination: > 35 minutes    ______________________________________________________________________________    Elijah Chan PA-C

## 2023-01-03 NOTE — CONSULTS
Hematology/Oncology Consult    Patient: Megan Nicole MRN: [id number]     YOB: 1940  Age: 78 y.o. Sex: female      HPI      Chief Complaint   Patient presents with    Leg Pain       Megan Nicole is a 78 y.o. female who is being seen for  L3 intradural mass. She is a patient of Dr. Andrew Schneider and has metastatic non-small cell lung cancer for which she is being followed since 2013. Over the course of these past 10 years she has received multiple treatments including surgical resection, CarboTaxol radiation, carboplatin Alimta, Taxotere & Opdivo. She had complete remission in 2017 at which time all treatments were stopped. She had a most recent PET scan in September which did not show any clear-cut metastatic disease or recurrence. She developed some low back pain in October and subsequently was seen by orthopedics and obtained an MRI of the lumbar spine mid December. On this MRI she is noted to have an intradural mass posterior to L3 feeling the thecal sac there was a concern for intradural metastasis. The spinal cord terminates normally posterior to T12-L1 ; the soft tissue mass filling the thecal sac posterior to L3 measures 3 cm. She was ordered complete staging work-up again. However she developed progressively worsening low back pain radiating down her right leg along with the development of weakness in her legs. This prompted her to come to the hospital on December 30. Radiation oncology was consulted and was started on IV steroids. She received her first dose of radiation this morning. Medical oncology is consulted for further recommendations today. Patient is accompanied by her daughters in the room. She wants to know the treatment plan and what treatment she is receiving. She is receiving morphine for back pain along with oxycodone still has pain with any movement. She is able to still move her legs in bed the right leg movement is very painful for her.   Some weakness is noted in the leg as well  She denies any urinary or bowel incontinence or retention.       Past Medical History:   Diagnosis Date    Arrhythmia     \"fast heart rate\" at times, takes Toprol to control    Arthritis     Asthma     Cancer ([facility] [address] 01/01/1996    RUL lung    Cancer ([facility] [address] 01/01/2001    left breast    Cancer ([facility] [address] 2013, 2014    right lung    COPD     Dyslipidemia 04/29/2014    GERD (gastroesophageal reflux disease)     Hyperlipidemia     Irregular heart beat 1st-1983,early 2000's    SVT per patient-none since starting metoprolol early 2000's    Lung cancer ([facility] [address] 04/29/2014    Lung cancer, lower lobe ([facility] [address] 03/01/2013    T3Nx adenocarcinoma RLL    Other ill-defined conditions(799.89)     urinary urgency    Sleep apnea     Unspecified adverse effect of anesthesia     severe itching after lung surgery     Past Surgical History:   Procedure Laterality Date    HX APPENDECTOMY      HX BLADDER SUSPENSION  around 2004    HX BREAST LUMPECTOMY  2001    left w/nodes removed    HX CATARACT REMOVAL Bilateral 2013    HX HYSTERECTOMY  1985    ANTON    HX ORTHOPAEDIC Right 1980's    bunion removal    HX OTHER SURGICAL  3/5/2013    RLL superior segmentectomy    HX OTHER SURGICAL  3/11/2013    Resection of RLL margins    HX UROLOGICAL      Bladder tuck    SD CHEST SURGERY PROCEDURE UNLISTED  1996, 2013, 2014    right lung (see \"other\")    SD THORACOSCOPY SURG LOBECTOMY  1996    RUL lung cancer      Family History   Problem Relation Age of Onset    Cancer Mother         colon    Cancer Father         prostate    Cancer Sister         breast    Cancer Other         breast    Ovarian Cancer Other     No Known Problems Brother     No Known Problems Maternal Grandmother     No Known Problems Maternal Grandfather     No Known Problems Paternal Grandmother     No Known Problems Paternal Grandfather      Social History     Tobacco Use    Smoking status: Former     Packs/day: 1.00     Years: 60.00     Pack years: 60.00 Types: Cigarettes     Quit date: 3/4/2013     Years since quittin.8    Smokeless tobacco: Never   Substance Use Topics    Alcohol use: No      Current Facility-Administered Medications   Medication Dose Route Frequency Provider Last Rate Last Admin    cyclobenzaprine (FLEXERIL) tablet 10 mg  10 mg Oral TID PRN JACE Luna-C   10 mg at 23 0907    polyethylene glycol (MIRALAX) packet 17 g  17 g Oral QHS JACE Luna-GABRIELLA        hydrOXYzine pamoate (VISTARIL) capsule 25 mg  25 mg Oral TID PRN JACE Luna-C        diphenhydrAMINE (BENADRYL) capsule 25 mg  25 mg Oral Q6H PRN JACE Luna-C   25 mg at 23 0158    albuterol-ipratropium (DUO-NEB) 2.5 MG-0.5 MG/3 ML  3 mL Nebulization Q4H PRN Lori Chen MD        budesonide-formoteroL (SYMBICORT) 160-4.5 mcg/actuation HFA inhaler 1 Puff  1 Puff Inhalation BID RT Star Mares MD   1 Puff at 23 1348    And    tiotropium bromide (SPIRIVA RESPIMAT) 2.5 mcg /actuation  2 Puff Inhalation DAILY Star Mares MD   2 Puff at 23 0911    albuterol (PROVENTIL HFA, VENTOLIN HFA, PROAIR HFA) inhaler 2 Puff  2 Puff Inhalation Q6H PRN Lori Chen MD        folic acid (FOLVITE) tablet 1 mg  1 mg Oral JAH Chen MD   1 mg at 23 0716    gabapentin (NEURONTIN) capsule 400 mg  400 mg Oral TID Lori Chen MD   400 mg at 23 0907    pantoprazole (PROTONIX) tablet 40 mg  40 mg Oral JAH Chen MD   40 mg at 23 0716    sodium chloride (NS) flush 5-40 mL  5-40 mL IntraVENous Q8H Lori Chen MD   10 mL at 23 0530    sodium chloride (NS) flush 5-40 mL  5-40 mL IntraVENous PRN Lori Chen MD        acetaminophen (TYLENOL) tablet 650 mg  650 mg Oral Q6H PRN Lori Chen MD   650 mg at 23 0915    Or    acetaminophen (TYLENOL) suppository 650 mg  650 mg Rectal Q6H PRN Lori Chen MD ondansetron (ZOFRAN ODT) tablet 4 mg  4 mg Oral Q6H PRN [place] MD        Or    ondansetron (ZOFRAN) injection 4 mg  4 mg IntraVENous Q6H PRN [place] MD        enoxaparin (LOVENOX) injection 40 mg  40 mg SubCUTAneous DAILY [place] MD   40 mg at 01/03/23 0908    oxyCODONE ER (OxyCONTIN) tablet 20 mg  20 mg Oral Q12H [place] MD   20 mg at 01/03/23 0907    dexamethasone (DECADRON) 4 mg/mL injection 8 mg  8 mg IntraVENous Q8H [place] MD   8 mg at 01/03/23 0530    morphine injection 2 mg  2 mg IntraVENous Q4H PRN [place] MD   2 mg at 01/03/23 1057        Allergies   Allergen Reactions    Contrast Agent [Iodine] Rash    Adhesive Tape-Silicones Itching and Contact Dermatitis       Review of Systems:  Constitutional No fevers, chills, night sweats, excessive fatigue or weight loss. Allergic/Immunologic No recent allergic reactions   Eyes No significant visual difficulties. No diplopia. ENMT No problems with hearing, no sore throat, no sinus drainage. Endocrine No hot flashes or night sweats. No cold intolerance, polyuria, or polydipsia   Hematologic/Lymphatic No easy bruising or bleeding. The patient denies any tender or palpable lymph nodes   Breasts No abnormal masses of breast, nipple discharge or pain. Respiratory No dyspnea on exertion, orthopnea, chest pain, cough or hemoptysis. Cardiovascular No anginal chest pain, irregular heart beat, tachycardia, palpitations or orthopnea. Gastrointestinal No nausea, vomiting, diarrhea, constipation, cramping, dysphagia, reflux, heartburn, GI bleeding, or early satiety. No change in bowel habits. Genitourinary (M) No hematuria, dysuria, increased frequency, urgency, hesitancy or incontinence.    Musculoskeletal Severe low back pain radiating down the right leg and weakness of the legs   Integumentary No chronic rashes, inflammation, ulcerations, pruritus, petechiae, purpura, ecchymoses, or skin changes. Neurologic No headache, blurred vision, and full: Weakness of bilateral legs right worse than the left   Psychiatric No insomnia, depression, bjorn or mood swings. No psychotropic drugs. Objective:     Vitals:    01/02/23 2003 01/03/23 0245 01/03/23 0824 01/03/23 0911   BP: 136/61 (!) 156/72  (!) 144/70   Pulse: 89 67  83   Resp: 18 18  18   Temp: 98.4 °F (36.9 °C) 97.6 °F (36.4 °C)  97.9 °F (36.6 °C)   SpO2: 95% 96% 95% 95%   Weight:       Height:            Physical Exam:  Constitutional Alert, cooperative, oriented. Mood and affect appropriate. Appears close to chronological age. Well nourished. Well developed. Head Normocephalic; no scars   Eyes Conjunctivae and sclerae are clear and without icterus. Pupils are reactive and equal.   ENMT Sinuses are nontender. No oral exudates, ulcers, masses, thrush or mucositis. Oropharynx clear. Tongue normal.   Neck Supple without masses or thyromegaly. No jugular venous distension. Hematologic/Lymphatic No petechiae or purpura. No tender or palpable lymph nodes in the cervical, supraclavicular, axillary or inguinal area. Respiratory Lungs are clear to auscultation without rhonchi or wheezing. Cardiovascular Regular rate and rhythm of heart without murmurs, gallops or rubs. Chest / Line Site Chest is symmetric with no chest wall deformities. Abdomen Non-tender, non-distended, no masses, ascites or hepatosplenomegaly. Good bowel sounds. No guarding or rebound tenderness. No pulsatile masses. Musculoskeletal No tenderness or swelling, normal range of motion without obvious weakness. Extremities No visible deformities, no cyanosis, clubbing or edema. Skin No rashes, scars, or lesions suggestive of malignancy. No petechiae, purpura, or ecchymoses. No excoriations. Neurologic Bilateral lower extremity weakness 2/5 right greater than left   Psychiatric Alert and oriented times three. Coherent speech.  Verbalizes understanding of our discussions today. Lab/Data Review:  Recent Labs     01/01/23  0956   WBC 5.9   HGB 10.7*   HCT 35.0        Recent Labs     01/01/23  0956      K 3.9      CO2 33*   *   BUN 14   CREA 0.49*   CA 9.3     No results for input(s): PH, PCO2, PO2, HCO3, FIO2 in the last 72 hours. No results found for this or any previous visit (from the past 24 hour(s)). CT SPINE [facility] WO CONT    Result Date: 12/30/2022  1. No evidence of acute fracture. CT SPINE LUMB WO CONT    Result Date: 12/30/2022  1. No evidence of acute fracture. Stable chronic compression fractures of T12 and L2. 2. Known intradural mass at the level of L3 completely occupying the thecal sac as seen on prior MRI is not well seen on this examination. Assessment and Plan:     Hospital Problems  Date Reviewed: 12/30/2022            Codes Class Noted POA    Acute back pain ICD-10-CM: M54.9  ICD-9-CM: 724.5  12/30/2022 Yes        Lower extremity weakness ICD-10-CM: R29.898  ICD-9-CM: 729.89  12/30/2022 Yes        Gait disturbance ICD-10-CM: R26.9  ICD-9-CM: 781.2  12/30/2022 Yes           1. Metastatic non-small cell lung cancer,adenocarcinoma for which she is being treated since 2013. Over the course of these past 10 years she has received multiple treatments including surgical resection, CarboTaxol radiation, carboplatin Alimta, Taxotere & Opdivo. She had complete remission in 2017 at which time all treatments were stopped. -She did well until 10/2022 when she developed back pain with subsequent work up showing L3 intradural mass. _Obtain ct chest abdomen and pelvis to stage her disease now. She is allergic to iv dye and so non contrast imaging is being ordered. 2. L3 intradural mass: occupying the thecal sac. -Pain and weakness of Legs , rt >left.   -Started urgent radiation today ,seen by rad onc.   -She will need biopsy of this mass .    Will request orthopedic  evaluation to see if there is any benefit for surgery and also for biopsy.   -continue steroids iv .   -continue Ppi.  -continue pain management. Thank you for the consult.      Signed By: Deepali Ford MD     January 3, 2023

## 2023-01-03 NOTE — PROGRESS NOTES
Bedside, Verbal, and Written shift change report given to Nell RN (oncoming nurse) by Geovanna Gao RN (offgoing nurse). Report included the following information SBAR.

## 2023-01-03 NOTE — PROGRESS NOTES
PT eval order received and acknowledged. PT eval attempted at 1149am however per JACE Plascencia pt undergoing procedure today, advised hold PT evaluation at this time. Will continue to follow patient and attempt PT eval at a later time. Thank you.

## 2023-01-04 ENCOUNTER — HOSPICE ADMISSION (OUTPATIENT)
Dept: HOSPICE | Facility: HOSPICE | Age: 83
End: 2023-01-04

## 2023-01-04 ENCOUNTER — APPOINTMENT (OUTPATIENT)
Dept: CT IMAGING | Age: 83
DRG: 055 | End: 2023-01-04
Attending: INTERNAL MEDICINE
Payer: MEDICARE

## 2023-01-04 ENCOUNTER — HOSPITAL ENCOUNTER (INPATIENT)
Dept: RADIATION THERAPY | Age: 83
Discharge: HOME | DRG: 055 | End: 2023-01-04
Payer: MEDICARE

## 2023-01-04 LAB
ATRIAL RATE: 91 BPM
CALCULATED P AXIS, ECG09: 63 DEGREES
CALCULATED R AXIS, ECG10: 52 DEGREES
CALCULATED T AXIS, ECG11: 50 DEGREES
DIAGNOSIS, 93000: NORMAL
P-R INTERVAL, ECG05: 174 MS
Q-T INTERVAL, ECG07: 344 MS
QRS DURATION, ECG06: 78 MS
QTC CALCULATION (BEZET), ECG08: 423 MS
VENTRICULAR RATE, ECG03: 91 BPM

## 2023-01-04 PROCEDURE — 74011250637 HC RX REV CODE- 250/637: Performed by: HOSPITALIST

## 2023-01-04 PROCEDURE — 93005 ELECTROCARDIOGRAM TRACING: CPT

## 2023-01-04 PROCEDURE — 74011250636 HC RX REV CODE- 250/636: Performed by: STUDENT IN AN ORGANIZED HEALTH CARE EDUCATION/TRAINING PROGRAM

## 2023-01-04 PROCEDURE — 65270000029 HC RM PRIVATE

## 2023-01-04 PROCEDURE — 97165 OT EVAL LOW COMPLEX 30 MIN: CPT

## 2023-01-04 PROCEDURE — 97530 THERAPEUTIC ACTIVITIES: CPT

## 2023-01-04 PROCEDURE — 97162 PT EVAL MOD COMPLEX 30 MIN: CPT

## 2023-01-04 PROCEDURE — 94640 AIRWAY INHALATION TREATMENT: CPT

## 2023-01-04 PROCEDURE — 74011250636 HC RX REV CODE- 250/636: Performed by: HOSPITALIST

## 2023-01-04 PROCEDURE — 77010033678 HC OXYGEN DAILY

## 2023-01-04 PROCEDURE — 77412 RADIATION TX DELIVERY LVL 3: CPT

## 2023-01-04 PROCEDURE — 74011000250 HC RX REV CODE- 250: Performed by: HOSPITALIST

## 2023-01-04 PROCEDURE — 74011250637 HC RX REV CODE- 250/637: Performed by: STUDENT IN AN ORGANIZED HEALTH CARE EDUCATION/TRAINING PROGRAM

## 2023-01-04 PROCEDURE — 94761 N-INVAS EAR/PLS OXIMETRY MLT: CPT

## 2023-01-04 RX ORDER — MORPHINE SULFATE 2 MG/ML
2 INJECTION, SOLUTION INTRAMUSCULAR; INTRAVENOUS
Status: COMPLETED | OUTPATIENT
Start: 2023-01-04 | End: 2023-01-04

## 2023-01-04 RX ADMIN — MORPHINE SULFATE 2 MG: 2 INJECTION, SOLUTION INTRAMUSCULAR; INTRAVENOUS at 13:45

## 2023-01-04 RX ADMIN — CYCLOBENZAPRINE 10 MG: 10 TABLET, FILM COATED ORAL at 08:44

## 2023-01-04 RX ADMIN — GABAPENTIN 400 MG: 400 CAPSULE ORAL at 23:00

## 2023-01-04 RX ADMIN — TIOTROPIUM BROMIDE INHALATION SPRAY 2 PUFF: 3.12 SPRAY, METERED RESPIRATORY (INHALATION) at 07:57

## 2023-01-04 RX ADMIN — MORPHINE SULFATE 2 MG: 2 INJECTION, SOLUTION INTRAMUSCULAR; INTRAVENOUS at 08:44

## 2023-01-04 RX ADMIN — FOLIC ACID 1 MG: 1 TABLET ORAL at 09:01

## 2023-01-04 RX ADMIN — SODIUM CHLORIDE, PRESERVATIVE FREE 10 ML: 5 INJECTION INTRAVENOUS at 23:00

## 2023-01-04 RX ADMIN — MORPHINE SULFATE 2 MG: 2 INJECTION, SOLUTION INTRAMUSCULAR; INTRAVENOUS at 16:14

## 2023-01-04 RX ADMIN — DEXAMETHASONE SODIUM PHOSPHATE 8 MG: 4 INJECTION, SOLUTION INTRA-ARTICULAR; INTRALESIONAL; INTRAMUSCULAR; INTRAVENOUS; SOFT TISSUE at 05:37

## 2023-01-04 RX ADMIN — DOCUSATE SODIUM 100 MG: 100 CAPSULE, LIQUID FILLED ORAL at 08:44

## 2023-01-04 RX ADMIN — CYCLOBENZAPRINE 10 MG: 10 TABLET, FILM COATED ORAL at 13:46

## 2023-01-04 RX ADMIN — DEXAMETHASONE SODIUM PHOSPHATE 8 MG: 4 INJECTION, SOLUTION INTRA-ARTICULAR; INTRALESIONAL; INTRAMUSCULAR; INTRAVENOUS; SOFT TISSUE at 13:44

## 2023-01-04 RX ADMIN — PANTOPRAZOLE SODIUM 40 MG: 40 TABLET, DELAYED RELEASE ORAL at 08:44

## 2023-01-04 RX ADMIN — GABAPENTIN 400 MG: 400 CAPSULE ORAL at 16:15

## 2023-01-04 RX ADMIN — SODIUM CHLORIDE, PRESERVATIVE FREE 10 ML: 5 INJECTION INTRAVENOUS at 05:37

## 2023-01-04 RX ADMIN — ENOXAPARIN SODIUM 40 MG: 100 INJECTION SUBCUTANEOUS at 08:44

## 2023-01-04 RX ADMIN — POLYETHYLENE GLYCOL 3350 17 G: 17 POWDER, FOR SOLUTION ORAL at 20:50

## 2023-01-04 RX ADMIN — SODIUM CHLORIDE, PRESERVATIVE FREE 10 ML: 5 INJECTION INTRAVENOUS at 16:17

## 2023-01-04 RX ADMIN — BUDESONIDE AND FORMOTEROL FUMARATE DIHYDRATE 1 PUFF: 160; 4.5 AEROSOL RESPIRATORY (INHALATION) at 07:56

## 2023-01-04 RX ADMIN — MORPHINE SULFATE 2 MG: 2 INJECTION, SOLUTION INTRAMUSCULAR; INTRAVENOUS at 04:10

## 2023-01-04 RX ADMIN — DEXAMETHASONE SODIUM PHOSPHATE 8 MG: 4 INJECTION, SOLUTION INTRA-ARTICULAR; INTRALESIONAL; INTRAMUSCULAR; INTRAVENOUS; SOFT TISSUE at 23:00

## 2023-01-04 RX ADMIN — OXYCODONE HYDROCHLORIDE 20 MG: 20 TABLET, FILM COATED, EXTENDED RELEASE ORAL at 20:50

## 2023-01-04 RX ADMIN — GABAPENTIN 400 MG: 400 CAPSULE ORAL at 08:44

## 2023-01-04 RX ADMIN — OXYCODONE HYDROCHLORIDE 20 MG: 20 TABLET, FILM COATED, EXTENDED RELEASE ORAL at 08:44

## 2023-01-04 RX ADMIN — BUDESONIDE AND FORMOTEROL FUMARATE DIHYDRATE 1 PUFF: 160; 4.5 AEROSOL RESPIRATORY (INHALATION) at 20:41

## 2023-01-04 RX ADMIN — CYCLOBENZAPRINE 10 MG: 10 TABLET, FILM COATED ORAL at 20:54

## 2023-01-04 NOTE — PROGRESS NOTES
----- Message from TIFFANI Murray sent at 2/28/2018  3:45 PM CST -----  Please let her know her thyroid and metabolic panel are stable. Keep plan to have EKG and event monitor on 3/5/18.    Thanks,  Christina  ----- Message -----  From: [facility]  Sent: 2/27/2018   1:11 PM  To: BERTHA Bain Np Result Pool         OCCUPATIONAL THERAPY EVALUATION  Patient: Megan Nicole (89 y.o. female)  Date: 1/4/2023  Primary Diagnosis: Acute back pain [M54.9]       Precautions: fall risk, Ax2       ASSESSMENT  Pt is a 78 y.o. female presenting to [facility] with c/o LE weakness, admitted 12/30/22 and currently being treated for gait disturbances, severe back pain secondary to spinal mass, h/o carcinoma, COPD on 2L via NC at baseline. 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 See below for home and PLOF information. Pt received semi-supine in bed with daughter at bedside upon arrival, AXO x4, and agreeable to OT evaluation. Based on current observations, pt presents with deficits in generalized strength/AROM, bed mobility, balance (see PT note for gait details), functional activity tolerance, decreased safety awareness, and pain currently impacting overall performance of ADLs and functional transfers/mobility (see below for objective details and assist levels). Overall, pt tolerates session fair with pt completing bed mobility, SPT from EOB>BSC, and completing toileting routine with mod Ax2 overall. Pt educated on log rolling technique with pt demo'ing understanding. Pt educated to push from/reach back for bed during sit<>stand transfers with pt demo'ing understanding. Total A req'd for posterior gregor care while standing using RW with mod Ax2. VC req'd to sequence taking side steps and maneuvering RW during SPT to and from [facility]. Once semi-supine, pt completed UB bathing with set up A to hand pt wet wipes. Hospital gown doffed/donned with min A to thread BUE. Pt would benefit from continued skilled OT services to address current impairments and improve IND and safety with self cares and functional transfers/mobility. Current OT d/c recommendation Tae Anand once medically appropriate.     Other factors to consider for discharge: family/social support, DME, time since onset, severity of deficits, functional baseline     Patient will benefit from skilled therapy intervention to address the above noted impairments. PLAN :  Recommendations and Planned Interventions: self care training, functional mobility training, therapeutic exercise, balance training, therapeutic activities, endurance activities, patient education, and home safety training    Recommend with staff: Encourage HEP in prep for ADLs/mobility    Frequency/Duration: Patient will be followed by occupational therapy:  3-5x/week to address goals. Recommendation for discharge: (in order for the patient to meet his/her long term goals)  Tae Anand    This discharge recommendation:  Has been made in collaboration with the attending provider and/or case management    IF patient discharges home will need the following DME: TBD       SUBJECTIVE:   Patient stated I'm scared because it's going to hurt.     OBJECTIVE DATA SUMMARY:   HISTORY:   Past Medical History:   Diagnosis Date    Arrhythmia     \"fast heart rate\" at times, takes Toprol to control    Arthritis     Asthma     Cancer ([facility] [address] 01/01/1996    RUL lung    Cancer ([facility] [address] 01/01/2001    left breast    Cancer ([facility] [address] 2013, 2014    right lung    COPD     Dyslipidemia 04/29/2014    GERD (gastroesophageal reflux disease)     Hyperlipidemia     Irregular heart beat 1st-1983,early 2000's    SVT per patient-none since starting metoprolol early 2000's    Lung cancer ([facility] [address] 04/29/2014    Lung cancer, lower lobe ([facility] [address] 03/01/2013    T3Nx adenocarcinoma RLL    Other ill-defined conditions(799.89)     urinary urgency    Sleep apnea     Unspecified adverse effect of anesthesia     severe itching after lung surgery     Past Surgical History:   Procedure Laterality Date    HX APPENDECTOMY      HX BLADDER SUSPENSION  around 2004    HX BREAST LUMPECTOMY  2001    left w/nodes removed    HX CATARACT REMOVAL Bilateral 2013    HX HYSTERECTOMY  1985    ANTON    HX ORTHOPAEDIC Right 1980's    bunion removal HX OTHER SURGICAL  3/5/2013    RLL superior segmentectomy    HX OTHER SURGICAL  3/11/2013    Resection of RLL margins    HX UROLOGICAL      Bladder tuck    TX CHEST SURGERY PROCEDURE UNLISTED  1996, 2013, 2014    right lung (see \"other\")    TX THORACOSCOPY SURG LOBECTOMY  1996    RUL lung cancer       Per pt:   Home Situation  Home Environment: Private residence  # Steps to Enter: 3  Rails to Enter: Yes  Hand Rails : Bilateral  Wheelchair Ramp: No  One/Two Story Residence: One story  Living Alone: No  Support Systems: Child(shawnee)  Patient Expects to be Discharged to[de-identified] Home with home health  Current DME Used/Available at Home: Walker, rolling, Walker, rollator, Grab bars, [place], straight, Shower chair, Raised toilet seat  Tub or Shower Type: Tub/Shower combination    Hand dominance: Right    EXAMINATION OF PERFORMANCE DEFICITS:  Cognitive/Behavioral Status:  Neurologic State: Alert  Orientation Level: Oriented X4  Cognition: Appropriate decision making; Appropriate safety awareness; Appropriate for age attention/concentration    Range of Motion:  AROM: Generally decreased, functional  PROM: Generally decreased, functional    Strength:  Strength: Generally decreased, functional    Coordination:  Fine Motor Skills-Upper: Left Intact; Right Intact    Gross Motor Skills-Upper: Left Intact; Right Intact    Balance:  Sitting: Intact; With support  Standing: Impaired; With support  Standing - Static: Fair;Constant support  Standing - Dynamic : Poor;Constant support    Functional Mobility and Transfers for ADLs:  Bed Mobility:  Rolling: Minimum assistance; Moderate assistance;Assist x2  Supine to Sit: Minimum assistance; Moderate assistance;Assist x2; Additional time  Sit to Supine: Moderate assistance;Assist x2; Additional time  Scooting: Moderate assistance; Additional time    Transfers:  Sit to Stand: Moderate assistance;Assist x2; Additional time  Stand to Sit: Moderate assistance;Assist x2; Additional time  Bed to Chair: Moderate assistance;Assist x2  Toilet Transfer : Moderate assistance;Assist x2      ADL Intervention and task modifications:  Grooming  Position Performed: Other (comment) (Supine)  Washing Face: Set-up    Upper Body Bathing  Bathing Assistance: Set-up (Handing pt wet wipe)  Position Performed: Other (comment) (Supine)    Upper Body [address]: Minimum  assistance    Toileting  Bowel Hygiene: Total assistance (dependent)    Therapeutic Exercise:  Pt would benefit from UE HEP in prep for ADLs and functional mobility/transfers. Functional Measure:    [facility] AM-PACTM \"6 Clicks\"                                                       Daily Activity Inpatient Short Form  How much help from another person does the patient currently need. .. Total; A Lot A Little None   1. Putting on and taking off regular lower body clothing? [x]  1 []  2 []  3 []  4   2. Bathing (including washing, rinsing, drying)? [x]  1 []  2 []  3 []  4   3. Toileting, which includes using toilet, bedpan or urinal? [x] 1 []  2 []  3 []  4   4. Putting on and taking off regular upper body clothing? []  1 [x]  2 []  3 []  4   5. Taking care of personal grooming such as brushing teeth? []  1 []  2 [x]  3 []  4   6. Eating meals? []  1 []  2 []  3 [x]  4   © 2007, Trustees of [facility], under license to [employer]. All rights reserved     Score: 12/24     Interpretation of Tool:  Represents clinically-significant functional categories (i.e. Activities of daily living).   Percentage of Impairment CH    0%   CI    1-19% CJ    20-39% CK    40-59% CL    60-79% CM    80-99% CN     100%   [facility]  Score 6-24 24 23 20-22 15-19 10-14 7-9 6     Occupational Therapy Evaluation Charge Determination   History Examination Decision-Making   LOW Complexity : Brief history review  MEDIUM Complexity : 3-5 performance deficits relating to physical, cognitive , or psychosocial skils that result in activity limitations and / or participation restrictions MEDIUM Complexity : Patient may present with comorbidities that affect occupational performnce. Miniml to moderate modification of tasks or assistance (eg, physical or verbal ) with assesment(s) is necessary to enable patient to complete evaluation       Based on the above components, the patient evaluation is determined to be of the following complexity level: LOW     Pain Ratin-9/10 back    Activity Tolerance:   Fair and requires frequent rest breaks    After treatment patient left in no apparent distress:    Supine in bed, Heels elevated for pressure relief, Call bell within reach, Bed / chair alarm activated, and Side rails x 3, bed locked and in lowest position    COMMUNICATION/EDUCATION:   The patients plan of care was discussed with: Physical therapist and Registered nurse. Patient/family agree to work toward stated goals and plan of care. This patients plan of care is appropriate for delegation to [facility]. PT/OT sessions occurred together for increased safety of pt and clinician. Thank you for this referral.  Venkatesh Arroyo OT  Time Calculation: 40 mins    Problem: Self Care Deficits Care Plan (Adult)  Goal: *Acute Goals and Plan of Care (Insert Text)  Description:   FUNCTIONAL STATUS PRIOR TO ADMISSION: Patient was modified independent using a rolling walker for functional mobility. Patient required minimal assistance for basic and instrumental ADLs. HOME SUPPORT: The patient lived alone with daughter to provide assistance. Occupational Therapy Goals  Initiated 2023  Patient Goal: Pt did not state. 1.  Patient will perform lower body dressing with minimal assistance/contact guard assist within 7 day(s). 2.  Patient will perform grooming with modified independence within 7 day(s). 3.  Patient will perform bathing with minimal assistance/contact guard assist within 7 day(s).   4.  Patient will perform toilet transfers with minimal assistance/contact guard assist within 7 day(s). 5.  Patient will perform all aspects of toileting with minimal assistance/contact guard assist within 7 day(s). 6.  Patient will participate in upper extremity therapeutic exercise/activities with independence within 7 day(s). 7.  Patient will utilize energy conservation techniques during functional activities with verbal cues within 7 day(s).   Outcome: Not Met

## 2023-01-04 NOTE — CONSULTS
Spine Surgery Consult Note    Patient: Bryce De MRN: [id number]  SSN: [id number]    YOB: 1940  Age: 78 y.o. 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 She would benefit from complete work-up of the neural axis including brain, cervical and thoracic spine for identification of additional areas of intradural metastases. I will defer to oncology to determine the value of this work-up. For now, I agree with radiation treatment to the L3 level as I am uncertain whether biopsy is feasible. I will defer to neurosurgical evaluation and opinion regarding the feasibility of biopsy as well as its clinical value. At this time I cannot offer any treatment or insight into care of this patient. I will sign off at this point. Thank you for asking me to participate in the care of this patient. Subjective:      Bryce De is a 78 y.o. female who is being seen for right severe and left mild leg pain and weakness onset 3 months ago. She reports initially being able to tolerate the pain taking aspirin at home but the pain became excruciating towards the end of November prompting an MRI. The MRI showed intrathecal mass lesion at the L3 level and the patient developed severe right leg weakness making mobilization out of bed, standing independently and walking independently quite difficult.   She was then admitted to the hospital.  She reports today that the right leg has diffuse dysesthesias below the knee while the left leg has some vague diffuse numbness. She also notes significant right leg weakness and almost no left leg weakness. She does not specifically report any bowel or bladder dysfunction. No recent weight loss or weight gain is noted by her.     Past Medical History:   Diagnosis Date    Arrhythmia     \"fast heart rate\" at times, takes Toprol to control    Arthritis     Asthma     Cancer ([facility] [address] 01/01/1996    RUL lung    Cancer ([facility] [address] 01/01/2001    left breast    Cancer ([facility] [address] 2013, 2014    right lung    COPD     Dyslipidemia 04/29/2014    GERD (gastroesophageal reflux disease)     Hyperlipidemia     Irregular heart beat 1st-1983,early 2000's    SVT per patient-none since starting metoprolol early 2000's    Lung cancer ([facility] [address] 04/29/2014    Lung cancer, lower lobe ([facility] [address] 03/01/2013    T3Nx adenocarcinoma RLL    Other ill-defined conditions(799.89)     urinary urgency    Sleep apnea     Unspecified adverse effect of anesthesia     severe itching after lung surgery     Past Surgical History:   Procedure Laterality Date    HX APPENDECTOMY      HX BLADDER SUSPENSION  around 2004    HX BREAST LUMPECTOMY  2001    left w/nodes removed    HX CATARACT REMOVAL Bilateral 2013    HX HYSTERECTOMY  1985    ANTON    HX ORTHOPAEDIC Right 1980's    bunion removal    HX OTHER SURGICAL  3/5/2013    RLL superior segmentectomy    HX OTHER SURGICAL  3/11/2013    Resection of RLL margins    HX UROLOGICAL      Bladder tuck    MI CHEST [address] [place] [address], 2013, 2014    right lung (see \"other\")    MI THORACOSCOPY SURG LOBECTOMY  1996    RUL lung cancer      Family History   Problem Relation Age of Onset    Cancer Mother         colon    Cancer Father         prostate    Cancer Sister         breast    Cancer Other         breast    Ovarian Cancer Other     No Known Problems Brother     No Known Problems Maternal Grandmother     No Known Problems Maternal Grandfather     No Known Problems Paternal Grandmother     No Known Problems Paternal Grandfather      Social History     Tobacco Use    Smoking status: Former     Packs/day: 1.00     Years: 60.00     Pack years: 60.00     Types: Cigarettes     Quit date: 3/4/2013     Years since quittin.8    Smokeless tobacco: Never   Substance Use Topics    Alcohol use: No      Current Facility-Administered Medications   Medication Dose Route Frequency Provider Last Rate Last Admin    diatrizoate leonela-diatrizoat sod (MD-GASTROVIEW,GASTROGRAFIN) 66-10 % contrast solution 30 mL  30 mL Oral Will Hardy MD        morphine injection 2 mg  2 mg IntraVENous ONCE PRN Peri Fisher PA-C        docusate sodium (COLACE) capsule 100 mg  100 mg Oral DAILY JACE Ness-C   100 mg at 23 0844    cyclobenzaprine (FLEXERIL) tablet 10 mg  10 mg Oral TID PRN Peri Fisher PA-C   10 mg at 23 1346    polyethylene glycol (MIRALAX) packet 17 g  17 g Oral QHS Peri Fisher PA-C        hydrOXYzine pamoate (VISTARIL) capsule 25 mg  25 mg Oral TID PRN Peir Fisher PA-C        diphenhydrAMINE (BENADRYL) capsule 25 mg  25 mg Oral Q6H PRN JACE Ness-C   25 mg at 23 0158    albuterol-ipratropium (DUO-NEB) 2.5 MG-0.5 MG/3 ML  3 mL Nebulization Q4H PRN Tang Hickey MD        budesonide-formoteroL (SYMBICORT) 160-4.5 mcg/actuation HFA inhaler 1 Puff  1 Puff Inhalation BID RT Jacky Valladares MD   1 Puff at 23 0756    And    tiotropium bromide (SPIRIVA RESPIMAT) 2.5 mcg /actuation  2 Puff Inhalation DAILY Jacky Valladares MD   2 Puff at 23 0757    albuterol (PROVENTIL HFA, VENTOLIN HFA, PROAIR HFA) inhaler 2 Puff  2 Puff Inhalation Q6H PRN Tang Hickey MD        folic acid (FOLVITE) tablet 1 mg  1 mg Oral ACB Tang Hickey MD   1 mg at 23 0901    gabapentin (NEURONTIN) capsule 400 mg  400 mg Oral TID Tang Hickey MD   400 mg at 23 0563 pantoprazole (PROTONIX) tablet 40 mg  40 mg Oral ACB Amelia Cm MD   40 mg at 01/04/23 0844    sodium chloride (NS) flush 5-40 mL  5-40 mL IntraVENous Q8H Amelia Cm MD   10 mL at 01/04/23 0537    sodium chloride (NS) flush 5-40 mL  5-40 mL IntraVENous PRN Amelia Cm MD        acetaminophen (TYLENOL) tablet 650 mg  650 mg Oral Q6H PRN Amelia Cm MD   650 mg at 01/03/23 1647    Or    acetaminophen (TYLENOL) suppository 650 mg  650 mg Rectal Q6H PRN Amelia Cm MD        ondansetron (ZOFRAN ODT) tablet 4 mg  4 mg Oral Q6H PRN Amelia Cm MD        Or    ondansetron (ZOFRAN) injection 4 mg  4 mg IntraVENous Q6H PRN Amelia Cm MD        enoxaparin (LOVENOX) injection 40 mg  40 mg SubCUTAneous DAILY Amelia Cm MD   40 mg at 01/04/23 0844    oxyCODONE ER (OxyCONTIN) tablet 20 mg  20 mg Oral Q12H Amelia Cm MD   20 mg at 01/04/23 0844    dexamethasone (DECADRON) 4 mg/mL injection 8 mg  8 mg IntraVENous Q8H Amelia Cm MD   8 mg at 01/04/23 1344    morphine injection 2 mg  2 mg IntraVENous Q4H PRN Amelia Cm MD   2 mg at 01/04/23 1345        Allergies   Allergen Reactions    Contrast Agent [Iodine] Rash    Adhesive Tape-Silicones Itching and Contact Dermatitis       Review of Systems:  Pertinent items are noted in the History of Present Illness. Objective:     Vitals:    01/03/23 2026 01/04/23 0404 01/04/23 0740 01/04/23 0755   BP: (!) 148/69 (!) 142/68 (!) 156/72    Pulse: 84 79 62    Resp: 18 20 18    Temp: 97.6 °F (36.4 °C) 97.4 °F (36.3 °C) 98.2 °F (36.8 °C)    SpO2: 95% 94% 97% 96%   Weight:       Height:            Physical Exam:  GENERAL: alert, cooperative, no distress  NEUROLOGIC: positive findings: Right lower extremity weakness focally at the quadriceps which is 2/5, tibialis anterior which is 4/5 and EHL which is 3/5. Hip flexor on the right side is 5/5.   Diffuse bilateral dysesthesias right much more severe than left noted. Lower extremity reflexes are diminished bilaterally. Imaging Review:    MRI of the lumbar spine from 12/16/2022 was reviewed and shows intradural mass at the L3 level. No other similar lesions are noted. PET/CT from 9/17/2022 does demonstrate a significant area of uptake at the L3 level within the canal consistent with the lesion later noted in December. I do not see any similar intrathecal areas of uptake, certainly none of this intensity. However there may be some lower intensity uptake within the cervical spine that may be physiologic versus early metastatic lesions.     Signed By: Gissel Mathew MD     January 4, 2023

## 2023-01-04 NOTE — PROGRESS NOTES
Notified PA of patient's declination of miralax x 2 nights and requested to replace this morning. Also asked for pain med PRN radiation. Notified patient and daughter radiation tentatively for 2pm.  Also informed daughter of inablility to get patient off of bed with 3 assist.  Spoke with CT regarding orders and informed them the patient would not likely tolerate positioning for scans. Notified PA as well and placed a call out to oncology as patient may need a form of sedation to proceed with scans. She cannot toleate laying flat due to such pain. Also mentioned palliative care to daughter and PA. Daughter receptive and stated she has spoken with people regarding pharmacy. However there is no IP palliative per PA. Patient and daughter were still discussing chemo in addition to radiation. Awaiting oncology round and call back. Treating patient accordingly to condition, orders, and scope of practice.

## 2023-01-04 NOTE — PROGRESS NOTES
Hospitalist Progress Note        Daily Progress Note: 1/4/2023 11:32 AM  Hospital Course     Gely Otero 78 y.o. female history of carcinoma of the lung with lobectomy in 1996, since then on follow-up with surveillance. She woke up this morning with severe lower extremity weakness and pain unable to stand up and walk. 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 She is recommended to follow-up with oncology, Dr. Latrell Kidd, had MRI and PET scan scheduled January 2023. States the back pain started getting worse, its severe, given oxycodone 10 mg 3 times a day but not helping the pain. Its radiating into the legs, now with severe weakness. She is crying with pain worse with any movement. Due to the mass in the lumbar spine requested for CT of thoracic spine and lumbar spine, consult placed to radiation oncology. As she is not completely paralyzed, radiation oncology recommended to continue steroids at this time and follow-up. Dr. Madhuri Burt, rad/onc, to start radiation treatment on 01/03. Oncology consult. Continue pain management inpatient for now. Spoke to patient and daughter at bedside about goals of care moving forward, and they are interested in pursing hospice at this time. Hospice consult placed. Subjective:     Patient seen and examined at bedside. She reports no bowel movement x 3 days, but has not been taking her Miralax. Unable to place patient on bedpan with assist of 3 staff members. Spoke to patient and daughter about goals moving forward. They do not want SNF. Patient will not qualify for IRF. Unsafe to discharge with [facility]. They are interested in pursuing home hospice at this time. REVIEW OF SYSTEMS    Constitutional:  Negative for chills, fever and malaise/fatigue. HENT:  Negative for congestion and sore throat.     Eyes:  Negative for blurred vision and double vision. Respiratory:  Negative for cough, sputum production, shortness of breath and wheezing. Cardiovascular:  Negative for chest pain, palpitations, orthopnea and leg swelling. Gastrointestinal:  Negative for abdominal pain, blood in stool, constipation, diarrhea, nausea and vomiting. Genitourinary:  Negative for dysuria, flank pain, frequency, hematuria and urgency. Musculoskeletal:  + back pain, + joint pain, + myalgias  Skin:  Negative for itching and rash. Neurological:  Negative for dizziness, speech change, seizures and headaches. Psychiatric/Behavioral:  Negative for depression. The patient is not nervous/anxious.       Objective:     Current Facility-Administered Medications   Medication Dose Route Frequency    diatrizoate leonela-diatrizoat sod (MD-GASTROVIEW,GASTROGRAFIN) 66-10 % contrast solution 30 mL  30 mL Oral ONCE    morphine injection 2 mg  2 mg IntraVENous ONCE PRN    docusate sodium (COLACE) capsule 100 mg  100 mg Oral DAILY    cyclobenzaprine (FLEXERIL) tablet 10 mg  10 mg Oral TID PRN    polyethylene glycol (MIRALAX) packet 17 g  17 g Oral QHS    hydrOXYzine pamoate (VISTARIL) capsule 25 mg  25 mg Oral TID PRN    diphenhydrAMINE (BENADRYL) capsule 25 mg  25 mg Oral Q6H PRN    albuterol-ipratropium (DUO-NEB) 2.5 MG-0.5 MG/3 ML  3 mL Nebulization Q4H PRN    budesonide-formoteroL (SYMBICORT) 160-4.5 mcg/actuation HFA inhaler 1 Puff  1 Puff Inhalation BID RT    And    tiotropium bromide (SPIRIVA RESPIMAT) 2.5 mcg /actuation  2 Puff Inhalation DAILY    albuterol (PROVENTIL HFA, VENTOLIN HFA, PROAIR HFA) inhaler 2 Puff  2 Puff Inhalation G1T PRN    folic acid (FOLVITE) tablet 1 mg  1 mg Oral ACB    gabapentin (NEURONTIN) capsule 400 mg  400 mg Oral TID    pantoprazole (PROTONIX) tablet 40 mg  40 mg Oral ACB    sodium chloride (NS) flush 5-40 mL  5-40 mL IntraVENous Q8H    sodium chloride (NS) flush 5-40 mL  5-40 mL IntraVENous PRN    acetaminophen (TYLENOL) tablet 650 mg  650 mg Oral Q6H PRN    Or    acetaminophen (TYLENOL) suppository 650 mg  650 mg Rectal Q6H PRN    ondansetron (ZOFRAN ODT) tablet 4 mg  4 mg Oral Q6H PRN    Or    ondansetron (ZOFRAN) injection 4 mg  4 mg IntraVENous Q6H PRN    enoxaparin (LOVENOX) injection 40 mg  40 mg SubCUTAneous DAILY    oxyCODONE ER (OxyCONTIN) tablet 20 mg  20 mg Oral Q12H    dexamethasone (DECADRON) 4 mg/mL injection 8 mg  8 mg IntraVENous Q8H    morphine injection 2 mg  2 mg IntraVENous Q4H PRN       Visit Vitals  BP (!) 156/72 (BP 1 Location: Left upper arm, BP Patient Position: At rest)   Pulse 62   Temp 98.2 °F (36.8 °C)   Resp 18   Ht 5' 6\" (1.676 m)   Wt 81.6 kg (180 lb)   SpO2 96%   BMI 29.05 kg/m²    O2 Flow Rate (L/min): 2 l/min O2 Device: Nasal cannula    Temp (24hrs), Av.7 °F (36.5 °C), Min:97.4 °F (36.3 °C), Max:98.2 °F (36.8 °C)      No intake/output data recorded.  1901 -  0700  In: -   Out: 750 [Urine:750]    PHYSICAL EXAM:    Constitutional: No acute distress  Skin: Extremities and face reveal no rashes. HEENT: Sclerae anicteric. PERRL. No oral ulcers. The neck is supple and no masses. Cardiovascular: Regular rate and rhythm. +S1/S2. No murmur or gallop. No JVD. Respiratory:  Clear breath sounds bilaterally with no wheezes, rales, or rhonchi. GI: Abdomen nondistended, soft, and nontender. Normal active bowel sounds. Rectal: Deferred   Musculoskeletal: Difficulty lifting lower extremities s/t pain, right > left s/t pain and weakness. No pitting edema of the lower legs. Neurological:  Patient is A&Ox3. Cranial nerves II-XII grossly intact  Psychiatric: Mood and affect appropriate    Data Review    No results found for this or any previous visit (from the past 24 hour(s)). CT SPINE LUMB WO CONT   Final Result   1. No evidence of acute fracture.  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   2. 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 CT SPINE [facility] WO CONT   Final Result   1. No evidence of acute fracture. CT ABD PELV WO CONT    (Results Pending)   CT CHEST ABD PELV WO CONT    (Results Pending)       Active Problems:    Acute back pain (12/30/2022)      Lower extremity weakness (12/30/2022)      Gait disturbance (12/30/2022)      Assessment/Plan:     1. Gait disturbance with lower extremity weakness  - Most likely secondary to cord compression and tumor  - Continue IV dexamethasone as per radiation oncology recommendations, 10 mg of 1 dose now then 8 mg every 8 hours and monitor closely. - Frequent repositioning     2. Severe back pain secondary to suspected spinal mass  - Increased the dose of the oxycodone to long-acting OxyContin 20 mg twice a day with IV morphine breakthrough pain relief. - Mass at L3 that was clearly present 2 weeks ago on MRI and appears to have progressed causing increased pain and possibly lower extremity weakness.  - Radiation oncology consult  - Received dose #1 of 10 radiation. 30 Gy in 10 fractions per Rad Onc starting on 01/03/2023.    3. 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 COPD  - Mild with no signs of exacerbation on DuoNeb which we will continue      DVT Prophylaxis: Lovenox  GI Prophylaxis: Protonix   Code Status: Full Code  POA/NOK:    Disposition and discharge barriers:   Pain control  IV steroid  Hospice consult   Oncology consult     Care Plan discussed with: nursing, patient, and daughter at bedside. Discussed goals of care moving forward.  Patient interested in hospice consult.   _____________________________________________________________________________  Time spent in direct care including coordination of service, review of data and examination: > 35 minutes    ______________________________________________________________________________    Bhagat Brake, PA-C

## 2023-01-04 NOTE — PROGRESS NOTES
Hematology/Oncology progress notes. HPI      Chief Complaint   Patient presents with    Leg Pain       Gary Cristina is a 78 y.o. female who is being seen for  L3 intradural mass. She is a patient of Dr. Bailey Carranza and has metastatic non-small cell lung cancer for which she is being followed since 2013. Over the course of these past 10 years she has received multiple treatments including surgical resection, CarboTaxol radiation, carboplatin Alimta, Taxotere & Opdivo. She had complete remission in 2017 at which time all treatments were stopped. She had a most recent PET scan in September which did not show any clear-cut metastatic disease or recurrence. She developed some low back pain in October and subsequently was seen by orthopedics and obtained an MRI of the lumbar spine mid December. On this MRI she is noted to have an intradural mass posterior to L3 feeling the thecal sac there was a concern for intradural metastasis. The spinal cord terminates normally posterior to T12-L1 ; the soft tissue mass filling the thecal sac posterior to L3 measures 3 cm. She was ordered complete staging work-up again. However she developed progressively worsening low back pain radiating down her right leg along with the development of weakness in her legs. This prompted her to come to the hospital on December 30. Radiation oncology was consulted and was started on IV steroids. She received her first dose of radiation this morning. Medical oncology is consulted for further recommendations today. Interval history: She is feeling better today. Still in lot of pain but able to use bedside commode with nursing and PT help today. Received day 2 of radiation . Receiving mrophine and oxycodone for pain relief. Right leg weakness is stable. Left shows improvement and her pain overall seems better. She denied any urinary or bowel incontinence or retention.       Current Facility-Administered Medications Medication Dose Route Frequency Provider Last Rate Last Admin    diatrizoate leonela-diatrizoat sod (MD-GASTROVIEW,GASTROGRAFIN) 66-10 % contrast solution 30 mL  30 mL Oral Will Leavitt MD        docusate sodium (COLACE) capsule 100 mg  100 mg Oral DAILY Margarita Christiansen PA-C   100 mg at 01/04/23 0844    cyclobenzaprine (FLEXERIL) tablet 10 mg  10 mg Oral TID PRN Margarita Christiansen PA-C   10 mg at 01/04/23 1346    polyethylene glycol (MIRALAX) packet 17 g  17 g Oral QHS Margarita Christiansen PA-C        hydrOXYzine pamoate (VISTARIL) capsule 25 mg  25 mg Oral TID PRN Margarita Christiansen PA-C        diphenhydrAMINE (BENADRYL) capsule 25 mg  25 mg Oral Q6H PRN Margarita Christiansen PA-C   25 mg at 01/02/23 0158    albuterol-ipratropium (DUO-NEB) 2.5 MG-0.5 MG/3 ML  3 mL Nebulization Q4H PRN Julio César Romano MD        budesonide-formoteroL (SYMBICORT) 160-4.5 mcg/actuation HFA inhaler 1 Puff  1 Puff Inhalation BID RT Floresita Flores MD   1 Puff at 01/04/23 0756    And    tiotropium bromide (SPIRIVA RESPIMAT) 2.5 mcg /actuation  2 Puff Inhalation DAILY Floresita Flores MD   2 Puff at 01/04/23 0757    albuterol (PROVENTIL HFA, VENTOLIN HFA, PROAIR HFA) inhaler 2 Puff  2 Puff Inhalation Q6H PRN Julio César Romano MD        folic acid (FOLVITE) tablet 1 mg  1 mg Oral ACB Julio César Romano MD   1 mg at 01/04/23 0901    gabapentin (NEURONTIN) capsule 400 mg  400 mg Oral TID Julio César Romano MD   400 mg at 01/04/23 1615    pantoprazole (PROTONIX) tablet 40 mg  40 mg Oral ACB Julio César Romano MD   40 mg at 01/04/23 0844    sodium chloride (NS) flush 5-40 mL  5-40 mL IntraVENous Q8H Julio César Romano MD   10 mL at 01/04/23 1617    sodium chloride (NS) flush 5-40 mL  5-40 mL IntraVENous PRN Julio César Romano MD        acetaminophen (TYLENOL) tablet 650 mg  650 mg Oral Q6H PRN Julio César Romano MD   650 mg at 01/03/23 1647    Or    acetaminophen (TYLENOL) suppository 650 mg 650 mg Rectal Q6H PRN Carlos Gu MD        ondansetron (ZOFRAN ODT) tablet 4 mg  4 mg Oral Q6H PRN Carlos Gu MD        Or    ondansetron (ZOFRAN) injection 4 mg  4 mg IntraVENous Q6H PRN Carlos Gu MD        enoxaparin (LOVENOX) injection 40 mg  40 mg SubCUTAneous DAILY Carlos Gu MD   40 mg at 01/04/23 0844    oxyCODONE ER (OxyCONTIN) tablet 20 mg  20 mg Oral Q12H Carlos Gu MD   20 mg at 01/04/23 0844    dexamethasone (DECADRON) 4 mg/mL injection 8 mg  8 mg IntraVENous Q8H Carlos Gu MD   8 mg at 01/04/23 1344    morphine injection 2 mg  2 mg IntraVENous Q4H PRN Carlos Gu MD   2 mg at 01/04/23 1345        Allergies   Allergen Reactions    Contrast Agent [Iodine] Rash    Adhesive Tape-Silicones Itching and Contact Dermatitis       Review of Systems:  Constitutional No fevers, chills, night sweats, excessive fatigue or weight loss. Allergic/Immunologic No recent allergic reactions   Eyes No significant visual difficulties. No diplopia. ENMT No problems with hearing, no sore throat, no sinus drainage. Endocrine No hot flashes or night sweats. No cold intolerance, polyuria, or polydipsia   Hematologic/Lymphatic No easy bruising or bleeding. The patient denies any tender or palpable lymph nodes   Breasts No abnormal masses of breast, nipple discharge or pain. Respiratory No dyspnea on exertion, orthopnea, chest pain, cough or hemoptysis. Cardiovascular No anginal chest pain, irregular heart beat, tachycardia, palpitations or orthopnea. Gastrointestinal No nausea, vomiting, diarrhea, constipation, cramping, dysphagia, reflux, heartburn, GI bleeding, or early satiety. No change in bowel habits. Genitourinary (M) No hematuria, dysuria, increased frequency, urgency, hesitancy or incontinence.    Musculoskeletal Severe low back pain radiating down the right leg and weakness of the legs   Integumentary No chronic rashes, inflammation, ulcerations, pruritus, petechiae, purpura, ecchymoses, or skin changes. Neurologic No headache, blurred vision, and full: Weakness of bilateral legs right worse than the left   Psychiatric No insomnia, depression, bjorn or mood swings. No psychotropic drugs. Objective:     Vitals:    01/04/23 0404 01/04/23 0740 01/04/23 0755 01/04/23 1548   BP: (!) 142/68 (!) 156/72  (!) 146/68   Pulse: 79 62  83   Resp: 20 18  18   Temp: 97.4 °F (36.3 °C) 98.2 °F (36.8 °C)  97.7 °F (36.5 °C)   SpO2: 94% 97% 96% 97%   Weight:       Height:            Physical Exam:  Constitutional Elderly white female , Alert, cooperative, oriented. Mood and affect appropriate. Appears close to chronological age. Well nourished. Well developed. Head Normocephalic; no scars   Eyes Conjunctivae and sclerae are clear and without icterus. Pupils are reactive and equal.   ENMT Sinuses are nontender. No oral exudates, ulcers, masses, thrush or mucositis. Oropharynx clear. Tongue normal.   Neck Supple without masses or thyromegaly. No jugular venous distension. Hematologic/Lymphatic No petechiae or purpura. No tender or palpable lymph nodes in the cervical, supraclavicular, axillary or inguinal area. Respiratory Lungs are clear to auscultation without rhonchi or wheezing. Cardiovascular Regular rate and rhythm of heart without murmurs, gallops or rubs. Chest / Line Site Chest is symmetric with no chest wall deformities. Abdomen Non-tender, non-distended, no masses, ascites or hepatosplenomegaly. Good bowel sounds. No guarding or rebound tenderness. No pulsatile masses. Musculoskeletal No tenderness or swelling, normal range of motion without obvious weakness. Extremities No visible deformities, no cyanosis, clubbing or edema. Skin No rashes, scars, or lesions suggestive of malignancy. No petechiae, purpura, or ecchymoses. No excoriations.     Neurologic Bilateral lower extremity weakness 2/5 right greater than left   Psychiatric Alert and oriented times three. Coherent speech. Verbalizes understanding of our discussions today. Lab/Data Review:  No results for input(s): WBC, HGB, HCT, PLT, HGBEXT, HCTEXT, PLTEXT, HGBEXT, HCTEXT, PLTEXT in the last 72 hours. No results for input(s): NA, K, CL, CO2, GLU, BUN, CREA, CA, MG, PHOS, ALB, TBIL, TBILI, ALT, INR, INREXT, INREXT in the last 72 hours. No lab exists for component: SGOT    No results for input(s): PH, PCO2, PO2, HCO3, FIO2 in the last 72 hours. No results found for this or any previous visit (from the past 24 hour(s)). No results found. Assessment and Plan:     Hospital Problems  Date Reviewed: 12/30/2022            Codes Class Noted POA    Acute back pain ICD-10-CM: M54.9  ICD-9-CM: 724.5  12/30/2022 Yes        Lower extremity weakness ICD-10-CM: R29.898  ICD-9-CM: 729.89  12/30/2022 Yes        Gait disturbance ICD-10-CM: R26.9  ICD-9-CM: 781.2  12/30/2022 Yes         1. Metastatic non-small cell lung cancer,adenocarcinoma for which she is being treated since 2013. Over the course of these past 10 years she has received multiple treatments including surgical resection, CarboTaxol radiation, carboplatin Alimta, Taxotere & Opdivo. She had complete remission in 2017 at which time all treatments were stopped. -She did well until 10/2022 when she developed back pain with subsequent work up showing L3 intradural mass. _Obtain ct chest abdomen and pelvis to stage her disease now. She is allergic to iv dye and so non contrast imaging is being ordered. She however could not lay flat and so these are delayed. 2. L3 intradural mass: occupying the thecal sac. -Pain and weakness of Legs , rt >left.   -Started urgent radiation 1/3/22 ,seen by rad onc. Appreciate orthopedic  evaluation and recommendations. Also d/w neurosurgeon at [address] [place] [address],  surgical intervention for intradural mass is quite morbid and may not benefit the patient much.    Family and patient not inclined for major surgical intervention at this time. Given  high likely reed that this is metastasis and morbidity assoicated with surgical procedures, we decided to forego biopsy at this time. Liquid biopsy will be obtained after discharge.    -continue steroids iv .   -continue Ppi.  -continue pain management. Monitor for improvement with radiation, if any clinical worsening of symptoms, neurosurgical consultation will be needed. D/w patient.    D/w Dr. Tayo Crowder and Dr. Robert Mota    Signed By: Neealm Covington MD     January 4, 2023

## 2023-01-04 NOTE — PROGRESS NOTES
PHYSICAL THERAPY EVALUATION  Patient: Isidro Whiting (65 y.o. female)  Date: 1/4/2023  Primary Diagnosis: Acute back pain [M54.9]       Precautions: falls, spinal      ASSESSMENT  Pt is a 78 y.o. female admitted on 12/30/2022 for LE weakness, difficulty amb with severe back pain. Per medical records pt seen recently by orthopedics, MRI obtained of lumbar spine which showed intradural mass posterior to L3 in thecal sac. Pt currently being treated for gait disturbances, severe back pain secondary to spinal mass, h/o carcinoma, COPD (2L via NC at baseline). Pt semi-supine in bed with daughter present (left while pt EOB) upon PT/OT arrival, agreeable to evaluation. Pt A&O x 4. Pt currently on 2L O2 via NC. Based on the objective data described, the patient presents with generalized weakness, impaired functional mobility, impaired amb, impaired balance, and decreased activity tolerance. (See below for objective details and assist levels). Overall pt tolerated session fair today with c/o 8/10 lower back pain at rest. Pt instructed in log roll technique to enter and exit bed, pt with increased vocalization of pain but did not adjust pain rating throughout session. Pt required rocking momentum to sit to stand transfers, demonstrates short, shuffling, step to gt pattern requiring max verbal cues and encouragement due to hyperventilation with mobility. Pt able to have BM while seated on BSC, required assistance for toileting hygiene (see OT note for details), bed was noted to be soiled with OOB transfers, PT/OT completed gown and linen change. Pt will benefit from continued skilled PT to address above deficits and return to PLOF. Current PT DC recommendation Tae Anand once medically appropriate.     Current Level of Function Impacting Discharge (mobility/balance): mod Ax2 with additional time    Other factors to consider for discharge: severity of deficits, PMH, acute medical state      PLAN :  Recommendations and Planned Interventions: bed mobility training, transfer training, gait training, therapeutic exercises, patient and family training/education, and therapeutic activities      Recommend for staff: Out of bed to chair for meals, Frequent repositioning to prevent skin breakdown, and BSC for BM    Frequency/Duration: Patient will be followed by physical therapy:  3-5x/week to address goals. Recommendation for discharge: (in order for the patient to meet his/her long term goals)  Tae Thompson discharge recommendation:  Has been made in collaboration with the attending provider and/or case management    IF patient discharges home will need the following DME: to be determined (TBD)         SUBJECTIVE:   Patient stated this is going to hurt isn't it.     OBJECTIVE DATA SUMMARY:   HISTORY:    Past Medical History:   Diagnosis Date    Arrhythmia     \"fast heart rate\" at times, takes Toprol to control    Arthritis     Asthma     Cancer ([facility] 01/01/1996    RUL lung    Cancer ([address] 01/01/2001    left breast    Cancer ([facility] [address] 2013, 2014    right lung    COPD     Dyslipidemia 04/29/2014    GERD (gastroesophageal reflux disease)     Hyperlipidemia     Irregular heart beat 1st-1983,early 2000's    SVT per patient-none since starting metoprolol early 2000's    Lung cancer ([address] 04/29/2014    Lung cancer, lower lobe ([address] 03/01/2013    T3Nx adenocarcinoma RLL    Other ill-defined conditions(799.89)     urinary urgency    Sleep apnea     Unspecified adverse effect of anesthesia     severe itching after lung surgery     Past Surgical History:   Procedure Laterality Date    HX APPENDECTOMY      HX BLADDER SUSPENSION  around 2004    HX BREAST LUMPECTOMY  2001    left w/nodes removed    HX CATARACT REMOVAL Bilateral 2013    HX HYSTERECTOMY  1985    ANTON    HX ORTHOPAEDIC Right 1980's    bunion removal    HX OTHER SURGICAL  3/5/2013    RLL superior segmentectomy    HX OTHER SURGICAL  3/11/2013 Resection of RLL margins    HX UROLOGICAL      Bladder tuck    MA CHEST SURGERY PROCEDURE UNLISTED  1996, 2013, 2014    right lung (see \"other\")    MA THORACOSCOPY SURG LOBECTOMY  1996    RUL lung cancer       Home Situation  Home Environment: Private residence  # Steps to Enter: 3  Rails to Enter: Yes  Hand Rails : Bilateral  Wheelchair Ramp: No  One/Two Story Residence: One story  Living Alone: No  Support Systems: Child(shawnee)  Patient Expects to be Discharged to[de-identified] Home with home health  Current DME Used/Available at Home: Walker, rolling, Walker, rollator, Grab bars, [place], straight, Shower chair, Raised toilet seat  Tub or Shower Type: Tub/Shower combination    EXAMINATION/PRESENTATION/DECISION MAKING:   Critical Behavior:  Neurologic State: Alert  Orientation Level: Oriented X4  Cognition: Appropriate decision making, Appropriate safety awareness, Appropriate for age attention/concentration     Hearing:     Skin:  intact where visible  Edema: none noted   Range Of Motion:  AROM: Generally decreased, functional           PROM: Generally decreased, functional           Strength:    Strength: Generally decreased, functional          Functional Mobility:  Bed Mobility:  Rolling: Minimum assistance; Moderate assistance;Assist x2  Supine to Sit: Minimum assistance; Moderate assistance;Assist x2; Additional time  Sit to Supine: Moderate assistance;Assist x2; Additional time  Scooting: Moderate assistance; Additional time  Transfers:  Sit to Stand: Moderate assistance;Assist x2; Additional time  Stand to Sit: Moderate assistance;Assist x2; Additional time  Stand Pivot Transfers: Moderate assistance;Assist x2; Additional time                    Balance:   Sitting: Intact; With support  Standing: Impaired; With support  Standing - Static: Fair;Constant support  Standing - Dynamic : Poor;Constant support  Ambulation/Gait Training:  Distance (ft): 2 Feet (ft) (bed to [facility])  Assistive Device: Gait belt;Walker, rolling  Ambulation - Level of Assistance: Moderate assistance;Assist x2; Additional time     Gait Description (WDL): Exceptions to [facility]           Base of Support: Widened;Shift to left     Speed/Bailey: Slow;Shuffled    Therapeutic Exercises:   Pt would benefit from LE HEP to improve overall LE AROM/strength and can be further educated in next treatment session. Functional Measure:  [address] [place] [id number] AM-PAC 6 Clicks         Basic Mobility Inpatient Short Form  How much difficulty does the patient currently have. .. Unable A Lot A Little None   1. Turning over in bed (including adjusting bedclothes, sheets and blankets)? [] 1   [x] 2   [] 3   [] 4   2. Sitting down on and standing up from a chair with arms ( e.g., wheelchair, bedside commode, etc.)   [] 1   [x] 2   [] 3   [] 4   3. Moving from lying on back to sitting on the side of the bed? [] 1   [x] 2   [] 3   [] 4          How much help from another person does the patient currently need. .. Total A Lot A Little None   4. Moving to and from a bed to a chair (including a wheelchair)? [] 1   [x] 2   [] 3   [] 4   5. Need to walk in hospital room? [] 1   [x] 2   [] 3   [] 4   6. Climbing 3-5 steps with a railing? [x] 1   [] 2   [] 3   [] 4   © 2007, Trustees of [address] [place] [id number], under license to [employer]. All rights reserved     Score:  Initial: 9/24 Most Recent: X (Date: 1/4/2023 )   Interpretation of Tool:  Represents activities that are increasingly more difficult (i.e. Bed mobility, Transfers, Gait).   Score 24 23 22-20 19-15 14-10 9-7 6   Modifier CH CI CJ CK CL CM CN         Physical Therapy Evaluation Charge Determination   History Examination Presentation Decision-Making   HIGH Complexity :3+ comorbidities / personal factors will impact the outcome/ POC  HIGH Complexity : 4+ Standardized tests and measures addressing body structure, function, activity limitation and / or participation in recreation  MEDIUM Complexity : Evolving with changing characteristics  Other outcome measures [facility] 6  mod      Based on the above components, the patient evaluation is determined to be of the following complexity level: MEDIUM    Pain Ratin/10 at rest, elevated with mobility     Activity Tolerance:   Fair, requires rest breaks, and observed SOB with activity    After treatment patient left in no apparent distress:   Bed locked and in lowest position Supine in bed, Call bell within reach, and Side rails x 3 and nsg updated. Pt deferred recliner transfers at this time but is agreeable to complete tomorrow. GOALS:    Problem: Mobility Impaired (Adult and Pediatric)  Goal: *Acute Goals and Plan of Care (Insert Text)  Description: FUNCTIONAL STATUS PRIOR TO ADMISSION: Patient was modified independent using a rolling walker for functional mobility. Patient required minimal assistance for basic and instrumental ADLs. HOME SUPPORT PRIOR TO ADMISSION: The patient lived alone with daughter to provide assistance. Physical Therapy Goals  Initiated 2023  Pt stated goal: to go home  Pt will be I with LE HEP in 7 days. Pt will perform bed mobility with mod I utilizing log roll technique in 7 days. Pt will perform transfers with mod I in 7 days. Pt will amb 25-50 feet with LRAD safely with mod I in 7 days. Pt will verbalize and demonstrate compliance with spinal precautions to include no bending, lifting or twisting in 7 days. Outcome: Not Met       COMMUNICATION/EDUCATION:   The patients plan of care was discussed with: Occupational therapist, Registered nurse, and Case management. Fall prevention education was provided and the patient/caregiver indicated understanding., Patient/family have participated as able in goal setting and plan of care. , and Patient/family agree to work toward stated goals and plan of care. PT/OT sessions occurred together for increased safety of pt and clinician.      Thank you for this referral.  Lakeisha Wharton, PT, DPT   Time Calculation: 33 mins

## 2023-01-04 NOTE — HOSPICE
[facility]  [phone]     Patient Name: Tasia Oates  YOB: 1940  Age: 78 y.o. 190 Jae Michele RN Note:  Hospice consult received, reviewing chart. Will follow up with Unit Nurse and Care Manager to discuss plan of care, patient status and discharge disposition this afternoon. Thank you for the opportunity to be of service to this patient. 11:45: Bedside patient, who appears to be sleeping. No distress noted. Plan to reach out to family and arrange a Hospice information visit. 12:15: Call made to patient's daughter, Mercedes Gomez. Erin Sharp confirmed that she is patient's MPOA and has documentation. Erin Sharp provided goals for patient short term are to have palliative radiation treatments starting today, with hopes to shrink cancer. Erin Sharp shares that she is very familiar with hospice care, and has reached out to a \"friend\" from another hospice company. Erin Sharp states that she would like to meet with Eva Michele Thursday am at 9:00, however, she may elect another hospice company. PLAN: Eva Michele will meet with patient and Erin Sharp bedside in the am, 1/5/2023 around 9:00 am. Erin Sharp would like patient to move into her house, vs returning to patient's small home. Ra Orozco's home address:  [address] [place] [address]. Erin Sharp would like to continue to try radiation treatments before electing Hospice care/services. Also, updated Musa Headings would want to trial oral/SL route dosing for symptoms prior to discharge home with Hospice. Erin Sharp shared that pt has used Fentanyl patch in the past with good results. Erin Sharp has Hospice information to return call prior to scheduled meeting on 1/4.       Nati Jones RN, [place] [address] Nurse Liaison  [phone] Mobile  [phone] Office  Available on [employer]

## 2023-01-05 ENCOUNTER — HOSPITAL ENCOUNTER (INPATIENT)
Dept: RADIATION THERAPY | Age: 83
Discharge: HOME | DRG: 055 | End: 2023-01-05
Payer: MEDICARE

## 2023-01-05 PROCEDURE — 74011000250 HC RX REV CODE- 250: Performed by: HOSPITALIST

## 2023-01-05 PROCEDURE — 94640 AIRWAY INHALATION TREATMENT: CPT

## 2023-01-05 PROCEDURE — 74011250637 HC RX REV CODE- 250/637: Performed by: HOSPITALIST

## 2023-01-05 PROCEDURE — 74011250637 HC RX REV CODE- 250/637: Performed by: INTERNAL MEDICINE

## 2023-01-05 PROCEDURE — 65270000029 HC RM PRIVATE

## 2023-01-05 PROCEDURE — 36415 COLL VENOUS BLD VENIPUNCTURE: CPT

## 2023-01-05 PROCEDURE — 77010033678 HC OXYGEN DAILY

## 2023-01-05 PROCEDURE — 74011250637 HC RX REV CODE- 250/637: Performed by: STUDENT IN AN ORGANIZED HEALTH CARE EDUCATION/TRAINING PROGRAM

## 2023-01-05 PROCEDURE — 77412 RADIATION TX DELIVERY LVL 3: CPT

## 2023-01-05 PROCEDURE — 94761 N-INVAS EAR/PLS OXIMETRY MLT: CPT

## 2023-01-05 PROCEDURE — 74011250636 HC RX REV CODE- 250/636: Performed by: HOSPITALIST

## 2023-01-05 RX ORDER — FENTANYL 25 UG/1
1 PATCH TRANSDERMAL
Status: DISCONTINUED | OUTPATIENT
Start: 2023-01-05 | End: 2023-01-07

## 2023-01-05 RX ADMIN — SODIUM CHLORIDE, PRESERVATIVE FREE 10 ML: 5 INJECTION INTRAVENOUS at 21:43

## 2023-01-05 RX ADMIN — MORPHINE SULFATE 2 MG: 2 INJECTION, SOLUTION INTRAMUSCULAR; INTRAVENOUS at 14:17

## 2023-01-05 RX ADMIN — OXYCODONE HYDROCHLORIDE 20 MG: 20 TABLET, FILM COATED, EXTENDED RELEASE ORAL at 21:42

## 2023-01-05 RX ADMIN — OXYCODONE HYDROCHLORIDE 20 MG: 20 TABLET, FILM COATED, EXTENDED RELEASE ORAL at 11:20

## 2023-01-05 RX ADMIN — ENOXAPARIN SODIUM 40 MG: 100 INJECTION SUBCUTANEOUS at 11:21

## 2023-01-05 RX ADMIN — BUDESONIDE AND FORMOTEROL FUMARATE DIHYDRATE 1 PUFF: 160; 4.5 AEROSOL RESPIRATORY (INHALATION) at 08:30

## 2023-01-05 RX ADMIN — POLYETHYLENE GLYCOL 3350 17 G: 17 POWDER, FOR SOLUTION ORAL at 21:43

## 2023-01-05 RX ADMIN — BUDESONIDE AND FORMOTEROL FUMARATE DIHYDRATE 1 PUFF: 160; 4.5 AEROSOL RESPIRATORY (INHALATION) at 21:27

## 2023-01-05 RX ADMIN — GABAPENTIN 400 MG: 400 CAPSULE ORAL at 18:56

## 2023-01-05 RX ADMIN — FOLIC ACID 1 MG: 1 TABLET ORAL at 11:20

## 2023-01-05 RX ADMIN — DEXAMETHASONE SODIUM PHOSPHATE 8 MG: 4 INJECTION, SOLUTION INTRA-ARTICULAR; INTRALESIONAL; INTRAMUSCULAR; INTRAVENOUS; SOFT TISSUE at 21:42

## 2023-01-05 RX ADMIN — SODIUM CHLORIDE, PRESERVATIVE FREE 10 ML: 5 INJECTION INTRAVENOUS at 18:05

## 2023-01-05 RX ADMIN — DOCUSATE SODIUM 100 MG: 100 CAPSULE, LIQUID FILLED ORAL at 11:20

## 2023-01-05 RX ADMIN — DEXAMETHASONE SODIUM PHOSPHATE 8 MG: 4 INJECTION, SOLUTION INTRA-ARTICULAR; INTRALESIONAL; INTRAMUSCULAR; INTRAVENOUS; SOFT TISSUE at 14:16

## 2023-01-05 RX ADMIN — CYCLOBENZAPRINE 10 MG: 10 TABLET, FILM COATED ORAL at 22:06

## 2023-01-05 RX ADMIN — MORPHINE SULFATE 2 MG: 2 INJECTION, SOLUTION INTRAMUSCULAR; INTRAVENOUS at 20:02

## 2023-01-05 RX ADMIN — GABAPENTIN 400 MG: 400 CAPSULE ORAL at 21:42

## 2023-01-05 RX ADMIN — TIOTROPIUM BROMIDE INHALATION SPRAY 2 PUFF: 3.12 SPRAY, METERED RESPIRATORY (INHALATION) at 08:30

## 2023-01-05 RX ADMIN — DEXAMETHASONE SODIUM PHOSPHATE 8 MG: 4 INJECTION, SOLUTION INTRA-ARTICULAR; INTRALESIONAL; INTRAMUSCULAR; INTRAVENOUS; SOFT TISSUE at 05:42

## 2023-01-05 RX ADMIN — GABAPENTIN 400 MG: 400 CAPSULE ORAL at 11:20

## 2023-01-05 RX ADMIN — SODIUM CHLORIDE, PRESERVATIVE FREE 10 ML: 5 INJECTION INTRAVENOUS at 05:42

## 2023-01-05 RX ADMIN — PANTOPRAZOLE SODIUM 40 MG: 40 TABLET, DELAYED RELEASE ORAL at 11:20

## 2023-01-05 NOTE — PROGRESS NOTES
Hematology/Oncology progress notes. HPI      Chief Complaint   Patient presents with    Leg Pain       Hope Tan is a 78 y.o. female who is being seen for  L3 intradural mass. She is a patient of Dr. Marco Antonio Cummins and has metastatic non-small cell lung cancer for which she is being followed since 2013. Over the course of these past 10 years she has received multiple treatments including surgical resection, CarboTaxol radiation, carboplatin Alimta, Taxotere & Opdivo. She had complete remission in 2017 at which time all treatments were stopped. She had a most recent PET scan in September which did not show any clear-cut metastatic disease or recurrence. She developed some low back pain in October and subsequently was seen by orthopedics and obtained an MRI of the lumbar spine mid December. On this MRI she is noted to have an intradural mass posterior to L3 feeling the thecal sac there was a concern for intradural metastasis. The spinal cord terminates normally posterior to T12-L1 ; the soft tissue mass filling the thecal sac posterior to L3 measures 3 cm. She was ordered complete staging work-up again. However she developed progressively worsening low back pain radiating down her right leg along with the development of weakness in her legs. This prompted her to come to the hospital on December 30. Radiation oncology was consulted and was started on IV steroids. She received her first dose of radiation this morning. Medical oncology is consulted for further recommendations today. Interval history: Receiving day 3 of radiation today. Patient is off the floor. Her daughter and her family are present at bedside. Reports her condition is pretty stable still has pain issues. Family is anxious about discharge and arrangements to rehab they do have certain choices where they want their mother to go for rehabilitation.   Recommended them to discuss with the  and case manager.     Current Facility-Administered Medications   Medication Dose Route Frequency Provider Last Rate Last Admin    docusate sodium (COLACE) capsule 100 mg  100 mg Oral DAILY Ester Valle, PA-C   100 mg at 01/05/23 1120    cyclobenzaprine (FLEXERIL) tablet 10 mg  10 mg Oral TID PRN [facility] [place], [facility]   10 mg at 01/04/23 2054    polyethylene glycol (MIRALAX) packet 17 g  17 g Oral QHS [facility], PA-C   17 g at 01/04/23 2050    hydrOXYzine pamoate (VISTARIL) capsule 25 mg  25 mg Oral TID PRN [facility], PA-[facility]        diphenhydrAMINE (BENADRYL) capsule 25 mg  25 mg Oral Q6H PRN [facility] Tori, PA-C   25 mg at 01/02/23 0158    albuterol-ipratropium (DUO-NEB) 2.5 MG-0.5 MG/3 ML  3 mL Nebulization Q4H PRN Catherine Bullard MD        budesonide-formoteroL (SYMBICORT) 160-4.5 mcg/actuation HFA inhaler 1 Puff  1 Puff Inhalation BID RT Ynes Mayer MD   1 Puff at 01/05/23 0830    And    tiotropium bromide (SPIRIVA RESPIMAT) 2.5 mcg /actuation  2 Puff Inhalation DAILY Ynes Mayer MD   2 Puff at 01/05/23 0830    albuterol (PROVENTIL HFA, VENTOLIN HFA, PROAIR HFA) inhaler 2 Puff  2 Puff Inhalation Q6H PRN Catherine Bullard MD        folic acid (FOLVITE) tablet 1 mg  1 mg Oral JAH Bullard MD   1 mg at 01/05/23 1120    gabapentin (NEURONTIN) capsule 400 mg  400 mg Oral TID Catherine Bullard MD   400 mg at 01/05/23 1120    pantoprazole (PROTONIX) tablet 40 mg  40 mg Oral JAH Bullard MD   40 mg at 01/05/23 1120    sodium chloride (NS) flush 5-40 mL  5-40 mL IntraVENous Q8H Catherine Bullard MD   10 mL at 01/05/23 0542    sodium chloride (NS) flush 5-40 mL  5-40 mL IntraVENous PRN Catherine Bullard MD        acetaminophen (TYLENOL) tablet 650 mg  650 mg Oral Q6H PRN Catherine Bullard MD   650 mg at 01/03/23 1647    Or    acetaminophen (TYLENOL) suppository 650 mg  650 mg Rectal Q6H PRN Catherine Bullard MD ondansetron (ZOFRAN ODT) tablet 4 mg  4 mg Oral Q6H PRN Paula Sharp MD        Or    ondansetron (ZOFRAN) injection 4 mg  4 mg IntraVENous Q6H PRN Paula Sharp MD        enoxaparin (LOVENOX) injection 40 mg  40 mg SubCUTAneous DAILY Paula Sharp MD   40 mg at 01/05/23 1121    oxyCODONE ER (OxyCONTIN) tablet 20 mg  20 mg Oral Q12H Paula Sharp MD   20 mg at 01/05/23 1120    dexamethasone (DECADRON) 4 mg/mL injection 8 mg  8 mg IntraVENous Q8H Paula Sharp MD   8 mg at 01/05/23 1416    morphine injection 2 mg  2 mg IntraVENous Q4H PRN Paula Sharp MD   2 mg at 01/05/23 1417        Allergies   Allergen Reactions    Contrast Agent [Iodine] Rash    Adhesive Tape-Silicones Itching and Contact Dermatitis       Review of Systems:  Constitutional No fevers, chills, night sweats, excessive fatigue or weight loss. Allergic/Immunologic No recent allergic reactions   Eyes No significant visual difficulties. No diplopia. ENMT No problems with hearing, no sore throat, no sinus drainage. Endocrine No hot flashes or night sweats. No cold intolerance, polyuria, or polydipsia   Hematologic/Lymphatic No easy bruising or bleeding. The patient denies any tender or palpable lymph nodes   Breasts No abnormal masses of breast, nipple discharge or pain. Respiratory No dyspnea on exertion, orthopnea, chest pain, cough or hemoptysis. Cardiovascular No anginal chest pain, irregular heart beat, tachycardia, palpitations or orthopnea. Gastrointestinal No nausea, vomiting, diarrhea, constipation, cramping, dysphagia, reflux, heartburn, GI bleeding, or early satiety. No change in bowel habits. Genitourinary (M) No hematuria, dysuria, increased frequency, urgency, hesitancy or incontinence.    Musculoskeletal Severe low back pain radiating down the right leg and weakness of the legs   Integumentary No chronic rashes, inflammation, ulcerations, pruritus, petechiae, purpura, ecchymoses, or skin changes. Neurologic No headache, blurred vision, and full: Weakness of bilateral legs right worse than the left   Psychiatric No insomnia, depression, bjorn or mood swings. No psychotropic drugs. Objective:     Vitals:    01/04/23 1943 01/04/23 2042 01/05/23 0213 01/05/23 0830   BP: (!) 144/69  137/64    Pulse: 91  74    Resp: 18  20    Temp: 98.5 °F (36.9 °C)  98.3 °F (36.8 °C)    SpO2: 95% 95% 94% 95%   Weight:       Height:            Physical Exam:  Constitutional Elderly white female , Alert, cooperative, oriented. Mood and affect appropriate. Appears close to chronological age. Well nourished. Well developed. Head Normocephalic; no scars   Eyes Conjunctivae and sclerae are clear and without icterus. Pupils are reactive and equal.   ENMT Sinuses are nontender. No oral exudates, ulcers, masses, thrush or mucositis. Oropharynx clear. Tongue normal.   Neck Supple without masses or thyromegaly. No jugular venous distension. Hematologic/Lymphatic No petechiae or purpura. No tender or palpable lymph nodes in the cervical, supraclavicular, axillary or inguinal area. Respiratory Lungs are clear to auscultation without rhonchi or wheezing. Cardiovascular Regular rate and rhythm of heart without murmurs, gallops or rubs. Chest / Line Site Chest is symmetric with no chest wall deformities. Abdomen Non-tender, non-distended, no masses, ascites or hepatosplenomegaly. Good bowel sounds. No guarding or rebound tenderness. No pulsatile masses. Musculoskeletal No tenderness or swelling, normal range of motion without obvious weakness. Extremities No visible deformities, no cyanosis, clubbing or edema. Skin No rashes, scars, or lesions suggestive of malignancy. No petechiae, purpura, or ecchymoses. No excoriations. Neurologic Bilateral lower extremity weakness 2/5 right greater than left   Psychiatric Alert and oriented times three. Coherent speech.  Verbalizes understanding of our discussions today. Lab/Data Review:  No results for input(s): WBC, HGB, HCT, PLT, HGBEXT, HCTEXT, PLTEXT, HGBEXT, HCTEXT, PLTEXT in the last 72 hours. No results for input(s): NA, K, CL, CO2, GLU, BUN, CREA, CA, MG, PHOS, ALB, TBIL, TBILI, ALT, INR, INREXT, INREXT in the last 72 hours. No lab exists for component: SGOT    No results for input(s): PH, PCO2, PO2, HCO3, FIO2 in the last 72 hours. No results found for this or any previous visit (from the past 24 hour(s)). No results found. Assessment and Plan:     Hospital Problems  Date Reviewed: 12/30/2022            Codes Class Noted POA    Acute back pain ICD-10-CM: M54.9  ICD-9-CM: 724.5  12/30/2022 Yes        Lower extremity weakness ICD-10-CM: R29.898  ICD-9-CM: 729.89  12/30/2022 Yes        Gait disturbance ICD-10-CM: R26.9  ICD-9-CM: 781.2  12/30/2022 Yes       1. Metastatic non-small cell lung cancer,adenocarcinoma for which she is being treated since 2013. Over the course of these past 10 years she has received multiple treatments including surgical resection, CarboTaxol radiation, carboplatin Alimta, Taxotere & Opdivo. She had complete remission in 2017 at which time all treatments were stopped. -She did well until 10/2022 when she developed back pain with subsequent work up showing L3 intradural mass. _Obtain ct chest abdomen and pelvis to stage her disease now. She is allergic to iv dye and so non contrast imaging is being ordered. She however could not lay flat and so these are delayed. 2. L3 intradural mass: occupying the thecal sac. -Pain and weakness of Legs , rt >left.   -Started urgent radiation 1/3/22 ,seen by rad onc. Appreciate orthopedic  evaluation and recommendations. Also d/w neurosurgeon at [address] [place] [address],  surgical intervention for intradural mass is quite morbid and may not benefit the patient much. Family and patient not inclined for major surgical intervention at this time.     Given  high likely reed that this is metastasis and morbidity assoicated with surgical procedures, we decided to forego biopsy at this time. Liquid biopsy will be obtained after discharge.    -continue steroids iv .   -continue Ppi.  -continue pain management.      Monitor for improvement with radiation,  Signed By: Tsering Dyer MD     January 5, 2023

## 2023-01-05 NOTE — HOSPICE
[facility]  [phone]     Patient Name: Akhil Lawrence  YOB: 1940  Age: 78 y.o. [address] [facility] RN Note:      Met with patient and daughter Dallas Oseguera at bedside. Ms. Etienne Lied alert, oriented with c/o back pain. Given opportunity to ask questions and all were answered. Daughter wants patient to move in with her even though she works full time. Today their concerns were surrounding how long patient would be hospitalized and hopeful she can remain here for six more radiation treatments. Daughter is interested in Fentanyl for pain control as this was effective in the past.  Updated CM who reports daughter now wants patient evaluated for IRF. Will follow while hospitalized.     Please contact us if we can further assist.    Kristian Fong RN  Clinical Nurse Liaison  [phone]

## 2023-01-05 NOTE — PROGRESS NOTES
Attempted PT/OT treatment 5110 however pt off the floor at radiology, Will continue to follow and attempt at a later time. Thank you.

## 2023-01-05 NOTE — PROGRESS NOTES
OT tx session attempted at [id number] 33, however pt off floor in radiology at this time. Will continue to follow pt and attempt tx session at a later time as time allows. Thank you.

## 2023-01-05 NOTE — PROGRESS NOTES
Bedside, Verbal, and Written shift change report given to Zane Linares (oncoming nurse) by Terri Motta RN (offgoing nurse). Report included the following information SBAR.

## 2023-01-05 NOTE — PROGRESS NOTES
CM asked to meet with patient to discuss DCP. CM met with patient at bedside to discuss therapy recs for SNF. Patient does not want to go to a SNF under any condition and wants to go home with [place]. No preference of agency, CM sent referrals. ------------    1400- Dr. Dione Deleon informed CM that he has met with patient and she now wants to go to [facility]. CM met with patient and daughter at bedside and choice given for [facility]. Daughter agrees with patient and does not want her to go to a SNF either. CM sent referral. [place] will not be needed.

## 2023-01-06 ENCOUNTER — APPOINTMENT (OUTPATIENT)
Dept: CT IMAGING | Age: 83
DRG: 055 | End: 2023-01-06
Attending: INTERNAL MEDICINE
Payer: MEDICARE

## 2023-01-06 ENCOUNTER — HOSPITAL ENCOUNTER (INPATIENT)
Dept: RADIATION THERAPY | Age: 83
Discharge: HOME | DRG: 055 | End: 2023-01-06
Payer: MEDICARE

## 2023-01-06 LAB
ALBUMIN SERPL-MCNC: 3 G/DL (ref 3.5–5)
ALBUMIN/GLOB SERPL: 0.8 (ref 1.1–2.2)
ALP SERPL-CCNC: 56 U/L (ref 45–117)
ALT SERPL-CCNC: 60 U/L (ref 12–78)
ANION GAP SERPL CALC-SCNC: 6 MMOL/L (ref 5–15)
AST SERPL W P-5'-P-CCNC: 15 U/L (ref 15–37)
BASOPHILS # BLD: 0 K/UL (ref 0–0.1)
BASOPHILS NFR BLD: 0 % (ref 0–1)
BILIRUB SERPL-MCNC: 0.6 MG/DL (ref 0.2–1)
BUN SERPL-MCNC: 28 MG/DL (ref 6–20)
BUN/CREAT SERPL: 56 (ref 12–20)
CA-I BLD-MCNC: 9.2 MG/DL (ref 8.5–10.1)
CHLORIDE SERPL-SCNC: 97 MMOL/L (ref 97–108)
CO2 SERPL-SCNC: 33 MMOL/L (ref 21–32)
CREAT SERPL-MCNC: 0.5 MG/DL (ref 0.55–1.02)
DIFFERENTIAL METHOD BLD: ABNORMAL
EOSINOPHIL # BLD: 0 K/UL (ref 0–0.4)
EOSINOPHIL NFR BLD: 0 % (ref 0–7)
ERYTHROCYTE [DISTWIDTH] IN BLOOD BY AUTOMATED COUNT: 13.5 % (ref 11.5–14.5)
GLOBULIN SER CALC-MCNC: 3.6 G/DL (ref 2–4)
GLUCOSE SERPL-MCNC: 112 MG/DL (ref 65–100)
HCT VFR BLD AUTO: 40.6 % (ref 35–47)
HGB BLD-MCNC: 13 G/DL (ref 11.5–16)
IMM GRANULOCYTES # BLD AUTO: 0.1 K/UL (ref 0–0.04)
IMM GRANULOCYTES NFR BLD AUTO: 1 % (ref 0–0.5)
LYMPHOCYTES # BLD: 0.8 K/UL (ref 0.8–3.5)
LYMPHOCYTES NFR BLD: 10 % (ref 12–49)
MAGNESIUM SERPL-MCNC: 2.3 MG/DL (ref 1.6–2.4)
MCH RBC QN AUTO: 30.7 PG (ref 26–34)
MCHC RBC AUTO-ENTMCNC: 32 G/DL (ref 30–36.5)
MCV RBC AUTO: 95.8 FL (ref 80–99)
MONOCYTES # BLD: 0.5 K/UL (ref 0–1)
MONOCYTES NFR BLD: 6 % (ref 5–13)
NEUTS SEG # BLD: 6.1 K/UL (ref 1.8–8)
NEUTS SEG NFR BLD: 83 % (ref 32–75)
NRBC # BLD: 0 K/UL (ref 0–0.01)
NRBC BLD-RTO: 0 PER 100 WBC
PLATELET # BLD AUTO: 279 K/UL (ref 150–400)
PMV BLD AUTO: 9.7 FL (ref 8.9–12.9)
POTASSIUM SERPL-SCNC: 4.4 MMOL/L (ref 3.5–5.1)
PROT SERPL-MCNC: 6.6 G/DL (ref 6.4–8.2)
RBC # BLD AUTO: 4.24 M/UL (ref 3.8–5.2)
SODIUM SERPL-SCNC: 136 MMOL/L (ref 136–145)
WBC # BLD AUTO: 7.4 K/UL (ref 3.6–11)

## 2023-01-06 PROCEDURE — 74011000250 HC RX REV CODE- 250: Performed by: HOSPITALIST

## 2023-01-06 PROCEDURE — 74011250637 HC RX REV CODE- 250/637: Performed by: HOSPITALIST

## 2023-01-06 PROCEDURE — 83735 ASSAY OF MAGNESIUM: CPT

## 2023-01-06 PROCEDURE — 85025 COMPLETE CBC W/AUTO DIFF WBC: CPT

## 2023-01-06 PROCEDURE — 97530 THERAPEUTIC ACTIVITIES: CPT

## 2023-01-06 PROCEDURE — 94640 AIRWAY INHALATION TREATMENT: CPT

## 2023-01-06 PROCEDURE — 74011250637 HC RX REV CODE- 250/637: Performed by: STUDENT IN AN ORGANIZED HEALTH CARE EDUCATION/TRAINING PROGRAM

## 2023-01-06 PROCEDURE — 74011250636 HC RX REV CODE- 250/636: Performed by: HOSPITALIST

## 2023-01-06 PROCEDURE — 65270000029 HC RM PRIVATE

## 2023-01-06 PROCEDURE — 74176 CT ABD & PELVIS W/O CONTRAST: CPT

## 2023-01-06 PROCEDURE — 36415 COLL VENOUS BLD VENIPUNCTURE: CPT

## 2023-01-06 PROCEDURE — 77412 RADIATION TX DELIVERY LVL 3: CPT

## 2023-01-06 PROCEDURE — 77010033678 HC OXYGEN DAILY

## 2023-01-06 PROCEDURE — 94761 N-INVAS EAR/PLS OXIMETRY MLT: CPT

## 2023-01-06 PROCEDURE — 80053 COMPREHEN METABOLIC PANEL: CPT

## 2023-01-06 RX ADMIN — BUDESONIDE AND FORMOTEROL FUMARATE DIHYDRATE 1 PUFF: 160; 4.5 AEROSOL RESPIRATORY (INHALATION) at 08:28

## 2023-01-06 RX ADMIN — OXYCODONE HYDROCHLORIDE 20 MG: 20 TABLET, FILM COATED, EXTENDED RELEASE ORAL at 21:31

## 2023-01-06 RX ADMIN — FOLIC ACID 1 MG: 1 TABLET ORAL at 08:10

## 2023-01-06 RX ADMIN — BUDESONIDE AND FORMOTEROL FUMARATE DIHYDRATE 1 PUFF: 160; 4.5 AEROSOL RESPIRATORY (INHALATION) at 21:02

## 2023-01-06 RX ADMIN — DEXAMETHASONE SODIUM PHOSPHATE 8 MG: 4 INJECTION, SOLUTION INTRA-ARTICULAR; INTRALESIONAL; INTRAMUSCULAR; INTRAVENOUS; SOFT TISSUE at 06:05

## 2023-01-06 RX ADMIN — POLYETHYLENE GLYCOL 3350 17 G: 17 POWDER, FOR SOLUTION ORAL at 21:31

## 2023-01-06 RX ADMIN — GABAPENTIN 400 MG: 400 CAPSULE ORAL at 08:10

## 2023-01-06 RX ADMIN — OXYCODONE HYDROCHLORIDE 20 MG: 20 TABLET, FILM COATED, EXTENDED RELEASE ORAL at 08:10

## 2023-01-06 RX ADMIN — PANTOPRAZOLE SODIUM 40 MG: 40 TABLET, DELAYED RELEASE ORAL at 08:10

## 2023-01-06 RX ADMIN — SODIUM CHLORIDE, PRESERVATIVE FREE 10 ML: 5 INJECTION INTRAVENOUS at 21:32

## 2023-01-06 RX ADMIN — MORPHINE SULFATE 2 MG: 2 INJECTION, SOLUTION INTRAMUSCULAR; INTRAVENOUS at 14:28

## 2023-01-06 RX ADMIN — DOCUSATE SODIUM 100 MG: 100 CAPSULE, LIQUID FILLED ORAL at 08:10

## 2023-01-06 RX ADMIN — DEXAMETHASONE SODIUM PHOSPHATE 8 MG: 4 INJECTION, SOLUTION INTRA-ARTICULAR; INTRALESIONAL; INTRAMUSCULAR; INTRAVENOUS; SOFT TISSUE at 21:31

## 2023-01-06 RX ADMIN — MORPHINE SULFATE 2 MG: 2 INJECTION, SOLUTION INTRAMUSCULAR; INTRAVENOUS at 06:05

## 2023-01-06 RX ADMIN — DEXAMETHASONE SODIUM PHOSPHATE 8 MG: 4 INJECTION, SOLUTION INTRA-ARTICULAR; INTRALESIONAL; INTRAMUSCULAR; INTRAVENOUS; SOFT TISSUE at 14:35

## 2023-01-06 RX ADMIN — GABAPENTIN 400 MG: 400 CAPSULE ORAL at 17:15

## 2023-01-06 RX ADMIN — SODIUM CHLORIDE, PRESERVATIVE FREE 10 ML: 5 INJECTION INTRAVENOUS at 06:10

## 2023-01-06 RX ADMIN — SODIUM CHLORIDE, PRESERVATIVE FREE 10 ML: 5 INJECTION INTRAVENOUS at 14:50

## 2023-01-06 RX ADMIN — GABAPENTIN 400 MG: 400 CAPSULE ORAL at 21:31

## 2023-01-06 RX ADMIN — CYCLOBENZAPRINE 10 MG: 10 TABLET, FILM COATED ORAL at 20:10

## 2023-01-06 RX ADMIN — MORPHINE SULFATE 2 MG: 2 INJECTION, SOLUTION INTRAMUSCULAR; INTRAVENOUS at 20:10

## 2023-01-06 NOTE — CONSULTS
Rehab Consult    Patient: Luma Wilkins MRN: [id number]  SSN: [id number]    YOB: 1940  Age: 78 y.o. Sex: female      Consulting provider: Ludin Sarkar MD  Reason for consult: Evaluate for rehab needs     Admit date: 12/30/2022  LOS (days): 7    CC: Difficulty walking    Subjective: This is a 78 y.o. female with a past medical history including carcinoma of the lung, COPD. Patient initially presented to [facility] with severe lower extremity weakness not being unable to stand and walk. She was noted to recently undergo MRI of the lumbar which showed a intradural mass posterior to L3. Patient reports she started having worsening pain and weakness. Patient was started on steroids and radiation oncology was consulted. Patient started radiation treatments on 1/3/2023. Patient with pain management with fentanyl patch recently started. Patient family seen today while she is sitting in bed eating lunch appears to be in no acute distress at this time. Patient endorses she is having some improvement in strength as well as pain management. Patient notes that she is eager to continue therapy to get stronger. Functional History -   Baseline: Independent with mobility/transfers/ADLs.      Current: Patient currently functioning at a mod assist for his transfers, ADLs, mobility    Living situation: With family    Past Medical History:   Diagnosis Date    Arrhythmia     \"fast heart rate\" at times, takes Toprol to control    Arthritis     Asthma     Cancer ([address] 01/01/1996    RUL lung    Cancer ([address] 01/01/2001    left breast    Cancer ([address] 2013, 2014    right lung    COPD     Dyslipidemia 04/29/2014    GERD (gastroesophageal reflux disease)     Hyperlipidemia     Irregular heart beat 1st-1983,early 2000's    SVT per patient-none since starting metoprolol early 2000's    Lung cancer ([address] 04/29/2014    Lung cancer, lower lobe ([address] 03/01/2013    T3Nx adenocarcinoma RLL Other ill-defined conditions(799.89)     urinary urgency    Sleep apnea     Unspecified adverse effect of anesthesia     severe itching after lung surgery     Past Surgical History:   Procedure Laterality Date    HX APPENDECTOMY      HX BLADDER SUSPENSION  around     HX BREAST LUMPECTOMY      left w/nodes removed    HX CATARACT REMOVAL Bilateral 2013    HX HYSTERECTOMY  1985    ANTON    HX ORTHOPAEDIC Right 's    bunion removal    HX OTHER SURGICAL  3/5/2013    RLL superior segmentectomy    HX OTHER SURGICAL  3/11/2013    Resection of RLL margins    HX UROLOGICAL      Bladder tuck    AL CHEST SURGERY PROCEDURE UNLISTED  , ,     right lung (see \"other\")    AL THORACOSCOPY SURG LOBECTOMY      RUL lung cancer      Family History   Problem Relation Age of Onset    Cancer Mother         colon    Cancer Father         prostate    Cancer Sister         breast    Cancer Other         breast    Ovarian Cancer Other     No Known Problems Brother     No Known Problems Maternal Grandmother     No Known Problems Maternal Grandfather     No Known Problems Paternal Grandmother     No Known Problems Paternal Grandfather      Social History     Tobacco Use    Smoking status: Former     Packs/day: 1.00     Years: 60.00     Pack years: 60.00     Types: Cigarettes     Quit date: 3/4/2013     Years since quittin.8    Smokeless tobacco: Never   Substance Use Topics    Alcohol use: No      Current Facility-Administered Medications   Medication    fentaNYL (DURAGESIC) 25 mcg/hr patch 1 Patch    docusate sodium (COLACE) capsule 100 mg    cyclobenzaprine (FLEXERIL) tablet 10 mg    polyethylene glycol (MIRALAX) packet 17 g    hydrOXYzine pamoate (VISTARIL) capsule 25 mg    diphenhydrAMINE (BENADRYL) capsule 25 mg    albuterol-ipratropium (DUO-NEB) 2.5 MG-0.5 MG/3 ML    budesonide-formoteroL (SYMBICORT) 160-4.5 mcg/actuation HFA inhaler 1 Puff    And    tiotropium bromide (SPIRIVA RESPIMAT) 2.5 mcg Deanna Joe albuterol (PROVENTIL HFA, VENTOLIN HFA, PROAIR HFA) inhaler 2 Puff    folic acid (FOLVITE) tablet 1 mg    gabapentin (NEURONTIN) capsule 400 mg    pantoprazole (PROTONIX) tablet 40 mg    sodium chloride (NS) flush 5-40 mL    sodium chloride (NS) flush 5-40 mL    acetaminophen (TYLENOL) tablet 650 mg    Or    acetaminophen (TYLENOL) suppository 650 mg    ondansetron (ZOFRAN ODT) tablet 4 mg    Or    ondansetron (ZOFRAN) injection 4 mg    oxyCODONE ER (OxyCONTIN) tablet 20 mg    dexamethasone (DECADRON) 4 mg/mL injection 8 mg    morphine injection 2 mg        Allergies   Allergen Reactions    Contrast Agent [Iodine] Rash    Adhesive Tape-Silicones Itching and Contact Dermatitis       Review of Systems:  Positive for fatigue. Otherwise a complete review of systems was negative except as noted above in HPI.      Objective:     Vitals:    01/05/23 2127 01/06/23 0256 01/06/23 0824 01/06/23 0828   BP:  (!) 149/76 (!) 148/73    Pulse:  69 62    Resp:  18 18    Temp:  98 °F (36.7 °C) 97.5 °F (36.4 °C)    SpO2: 97% 96% 96% 94%   Weight:       Height:            Physical Exam:  General: NAD, pleasant and cooperative   HEENT: anicteric, moist oral mucosa   CV: RRR, no murmurs, 2+ pulses   Respiratory: clear and aerating well, no increased WOB  Abdomen: nondistended, nontender  Extremities: no peripheral edema, diffuse weakness BLE greater in right lower extremity  Musculoskeletal: moving extremities at least antigravity  Neuro: alert, normal speech, CN 2-12 intact, sensation intact to light touch     Labs:  Recent Results (from the past 24 hour(s))   CBC WITH AUTOMATED DIFF    Collection Time: 01/06/23  8:36 AM   Result Value Ref Range    WBC 7.4 3.6 - 11.0 K/uL    RBC 4.24 3.80 - 5.20 M/uL    HGB 13.0 11.5 - 16.0 g/dL    HCT 40.6 35.0 - 47.0 %    MCV 95.8 80.0 - 99.0 FL    MCH 30.7 26.0 - 34.0 PG    MCHC 32.0 30.0 - 36.5 g/dL    RDW 13.5 11.5 - 14.5 %    PLATELET 702 336 - 406 K/uL    MPV 9.7 8.9 - 12.9 FL    NRBC 0.0 0.0 PER 100 WBC    ABSOLUTE NRBC 0.00 0.00 - 0.01 K/uL    NEUTROPHILS 83 (H) 32 - 75 %    LYMPHOCYTES 10 (L) 12 - 49 %    MONOCYTES 6 5 - 13 %    EOSINOPHILS 0 0 - 7 %    BASOPHILS 0 0 - 1 %    IMMATURE GRANULOCYTES 1 (H) 0 - 0.5 %    ABS. NEUTROPHILS 6.1 1.8 - 8.0 K/UL    ABS. LYMPHOCYTES 0.8 0.8 - 3.5 K/UL    ABS. MONOCYTES 0.5 0.0 - 1.0 K/UL    ABS. EOSINOPHILS 0.0 0.0 - 0.4 K/UL    ABS. BASOPHILS 0.0 0.0 - 0.1 K/UL    ABS. IMM. GRANS. 0.1 (H) 0.00 - 0.04 K/UL    DF AUTOMATED     METABOLIC PANEL, COMPREHENSIVE    Collection Time: 01/06/23  8:36 AM   Result Value Ref Range    Sodium 136 136 - 145 mmol/L    Potassium 4.4 3.5 - 5.1 mmol/L    Chloride 97 97 - 108 mmol/L    CO2 33 (H) 21 - 32 mmol/L    Anion gap 6 5 - 15 mmol/L    Glucose 112 (H) 65 - 100 mg/dL    BUN 28 (H) 6 - 20 mg/dL    Creatinine 0.50 (L) 0.55 - 1.02 mg/dL    BUN/Creatinine ratio 56 (H) 12 - 20      eGFR >60 >60 ml/min/1.73m2    Calcium 9.2 8.5 - 10.1 mg/dL    Bilirubin, total 0.6 0.2 - 1.0 mg/dL    AST (SGOT) 15 15 - 37 U/L    ALT (SGPT) 60 12 - 78 U/L    Alk. phosphatase 56 45 - 117 U/L    Protein, total 6.6 6.4 - 8.2 g/dL    Albumin 3.0 (L) 3.5 - 5.0 g/dL    Globulin 3.6 2.0 - 4.0 g/dL    A-G Ratio 0.8 (L) 1.1 - 2.2     MAGNESIUM    Collection Time: 01/06/23  8:36 AM   Result Value Ref Range    Magnesium 2.3 1.6 - 2.4 mg/dL         Imaging:  No results found. 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     This patient can likely tolerate 3 hours of therapy per day. Discussed with patient/family. Barriers to rehab: Radiation treatments, patient still with several radiation treatments left      Continue PT/OT in the acute setting. Will continue to follow.        Signed By: Sade Acosta NP     January 6, 2023    Physical Medicine and Rehabilitation

## 2023-01-06 NOTE — PROGRESS NOTES
Hospitalist Progress Note    Daily Progress Note: 1/6/2023 11:32 AM  Hospital Course     Gely Otero 78 y.o. female history of carcinoma of the lung with lobectomy in 1996, since then on follow-up with surveillance. She woke up this morning with severe lower extremity weakness and pain unable to stand up and walk. 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 She is recommended to follow-up with oncology, Dr. Edie Wright, had MRI and PET scan scheduled January 2023. States the back pain started getting worse, its severe, given oxycodone 10 mg 3 times a day but not helping the pain. Its radiating into the legs, now with severe weakness. She is crying with pain worse with any movement. Due to the mass in the lumbar spine requested for CT of thoracic spine and lumbar spine, consult placed to radiation oncology. As she is not completely paralyzed, radiation oncology recommended to continue steroids at this time and follow-up. Dr. Miguel Uribe, rad/onc, to start radiation treatment on 01/03. Oncology consult. Continue pain management inpatient for now. Spoke to patient and daughter at bedside about goals of care moving forward, and they are interested in pursing hospice at this time. Hospice consult placed. Subjective:     Discussed at length with daughter and patient bedside regarding disposition and mgmt plans. Patient's very concerned and anxious about being at home and transport for XRT. Worried that she'll fall as she's still very weak in her legs. Pain much better controlled on Fentanyl. Discussed increasing to 50 mcg and change oral pain meds to PRN; they agreed. I discussed with Case Mgmt about disposition options. Patient/daughter do not want SNF; prefer [facility] but were also open to [facility] with PT/OT except for the issue with XRT. Today's is her 4th/10 XRT session.     No other acute change or symptom. Breathing ok    1/5/23:  Back pain. Better when she is up. Started XRT. Some increased movement in leg. Discussed with daughter/patient regarding pain and DC plans. Agreeable with Fentanyl patch and Rehab. Unfortunately, after discussing with Case Mgmt, [facility] will only consider accepting after XRT completed. REVIEW OF SYSTEMS    Constitutional:  Negative for chills, fever and malaise/fatigue. HENT:  Negative for congestion and sore throat. Eyes:  Negative for blurred vision and double vision. Respiratory:  Negative for cough, sputum production, shortness of breath and wheezing. Cardiovascular:  Negative for chest pain, palpitations, orthopnea and leg swelling. Gastrointestinal:  Negative for abdominal pain, blood in stool, constipation, diarrhea, nausea and vomiting. Genitourinary:  Negative for dysuria, flank pain, frequency, hematuria and urgency. Musculoskeletal:  + back pain, + joint pain, + myalgias  Skin:  Negative for itching and rash. Neurological:  Negative for dizziness, speech change, seizures and headaches. Psychiatric/Behavioral:  Negative for depression. The patient is not nervous/anxious.       Objective:     Current Facility-Administered Medications   Medication Dose Route Frequency    fentaNYL (DURAGESIC) 25 mcg/hr patch 1 Patch  1 Patch TransDERmal Q72H    docusate sodium (COLACE) capsule 100 mg  100 mg Oral DAILY    cyclobenzaprine (FLEXERIL) tablet 10 mg  10 mg Oral TID PRN    polyethylene glycol (MIRALAX) packet 17 g  17 g Oral QHS    hydrOXYzine pamoate (VISTARIL) capsule 25 mg  25 mg Oral TID PRN    diphenhydrAMINE (BENADRYL) capsule 25 mg  25 mg Oral Q6H PRN    albuterol-ipratropium (DUO-NEB) 2.5 MG-0.5 MG/3 ML  3 mL Nebulization Q4H PRN    budesonide-formoteroL (SYMBICORT) 160-4.5 mcg/actuation HFA inhaler 1 Puff  1 Puff Inhalation BID RT    And    tiotropium bromide (SPIRIVA RESPIMAT) 2.5 mcg /actuation  2 Puff Inhalation DAILY albuterol (PROVENTIL HFA, VENTOLIN HFA, PROAIR HFA) inhaler 2 Puff  2 Puff Inhalation B8Y PRN    folic acid (FOLVITE) tablet 1 mg  1 mg Oral ACB    gabapentin (NEURONTIN) capsule 400 mg  400 mg Oral TID    pantoprazole (PROTONIX) tablet 40 mg  40 mg Oral ACB    sodium chloride (NS) flush 5-40 mL  5-40 mL IntraVENous Q8H    sodium chloride (NS) flush 5-40 mL  5-40 mL IntraVENous PRN    acetaminophen (TYLENOL) tablet 650 mg  650 mg Oral Q6H PRN    Or    acetaminophen (TYLENOL) suppository 650 mg  650 mg Rectal Q6H PRN    ondansetron (ZOFRAN ODT) tablet 4 mg  4 mg Oral Q6H PRN    Or    ondansetron (ZOFRAN) injection 4 mg  4 mg IntraVENous Q6H PRN    oxyCODONE ER (OxyCONTIN) tablet 20 mg  20 mg Oral Q12H    dexamethasone (DECADRON) 4 mg/mL injection 8 mg  8 mg IntraVENous Q8H    morphine injection 2 mg  2 mg IntraVENous Q4H PRN       Visit Vitals  /70 (BP 1 Location: Left upper arm, BP Patient Position: At rest)   Pulse 67   Temp 98.2 °F (36.8 °C)   Resp 18   Ht 5' 6\" (1.676 m)   Wt 81.6 kg (180 lb)   SpO2 98%   BMI 29.05 kg/m²    O2 Flow Rate (L/min): 2 l/min O2 Device: Nasal cannula    Temp (24hrs), Av.9 °F (36.6 °C), Min:97.5 °F (36.4 °C), Max:98.2 °F (36.8 °C)      PHYSICAL EXAM:    Constitutional: No acute distress  Skin: Extremities and face reveal no rashes. HEENT: Sclerae anicteric. PERRL. No oral ulcers. The neck is supple and no masses. Cardiovascular: Regular rate and rhythm. +S1/S2. No murmur or gallop. No JVD. Respiratory:  Clear breath sounds bilaterally with no wheezes, rales, or rhonchi. GI: Abdomen nondistended, soft, and nontender. Normal active bowel sounds. Rectal: Deferred   Musculoskeletal: Difficulty lifting lower extremities s/t pain, right > left s/t pain and weakness. No pitting edema of the lower legs. Neurological:  Patient is A&Ox3. Cranial nerves II-XII grossly intact  Psychiatric: Mood and affect appropriate    Data Review    CT SPINE LUMB WO CONT   Final Result   1. No evidence of acute fracture. 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   2. 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 CT SPINE [facility] WO CONT   Final Result   1. No evidence of acute fracture. CT CHEST ABD PELV WO CONT    (Results Pending)       Assessment/Plan:     1. Gait disturbance with lower extremity weakness  - D/t cord compression and tumor  - Continue IV dexamethasone as per radiation oncology recommendations, 10 mg of 1 dose now then 8 mg every 8 hours and monitor closely. 2. Severe back pain secondary to suspected spinal mass  - Increased the dose of the oxycodone to long-acting OxyContin 20 mg twice a day with IV morphine breakthrough pain relief. - Mass at L3 that was clearly present 2 weeks ago on MRI and appears to have progressed causing increased pain and possibly lower extremity weakness.  - Discussed with Dr. Kerrie Vargas; per Spine surgeons --> very high complication risk.   - Radiation oncology consulted --> XRT started; 10 total (30 Gy in 10 fractions per Rad Onc starting on 01/03/2023)  - Add fentanyl --> increase to 50mcg; change other meds to PRN  - Add xanax prn    3. 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  COPD  - Mild with no signs of exacerbation on DuoNeb which we will continue    DVT Prophylaxis: Lovenox --> DC; high risk d/t spinal mass  GI Prophylaxis: Protonix   Code Status: Full Code  POA/NOK: patient/daugher    Dispo: See HPI from today//Friday    Roselia Kearney MD

## 2023-01-06 NOTE — PROGRESS NOTES
OCCUPATIONAL THERAPY TREATMENT  Patient: Carlos Bocanegra (58 y.o. female)  Date: 1/6/2023  Diagnosis: Acute back pain [M54.9] <principal problem not specified>      Precautions:    Chart, occupational therapy assessment, plan of care, and goals were reviewed. ASSESSMENT  Pt continues with skilled OT services and is progressing towards goals. Upon DE ANDA arrival, pt semi supine in bed with daughter in room and agreeable to tx session at this time. Overall, pt continues to present with deficits in generalized strength/AROM, coordination, bed mobility, static/dynamic sitting and standing balance and functional activity tolerance during performance of ADLs/mobility (see below for objective details and assist levels). Pt noted with continued improvement in overall mobility this date. Pt completed bed mobility with overall Min/ModA x2 due to pain. Reviewed log rolling prior to mobility and pt able to demonstrate understanding but continues to require additional assist with transferring to EOB. Pt completed transfers from EOB with ModA x2 overall but limited with fear of falling and pain. Pt initially agreeable to transfer to [facility], but due to fear of ambulation and reporting unable to initiate mobility with LLE, transfer was unable to be completed. Pt completed sit>stand transfer x4-5 trials with ModA x2 using RW and able to maintain standing for ~30 seconds each trial.  Pt noted to be shaking in BUE upon standing. Returned pt to supine with ModA x2 and repositioned in bed. Pt required total A for donning/doffing socks throughout session due to pain and limited reach. Recommend d/c to [facility] once medically appropriate. Other factors to consider for discharge: family/social support, DME, time since onset, severity of deficits, decline from functional baseline         PLAN :  Patient continues to benefit from skilled intervention to address the above impairments.   Continue treatment per established plan of care to address goals. Recommendation for discharge: (in order for the patient to meet his/her long term goals)  Tae Anand    This discharge recommendation:  Has been made in collaboration with the attending provider and/or case management    IF patient discharges home will need the following DME: TBD       SUBJECTIVE:   Patient stated They cleaned me up earlier.     OBJECTIVE DATA SUMMARY:   Cognitive/Behavioral Status:  Neurologic State: Alert  Orientation Level: Oriented X4  Cognition: Follows commands    Functional Mobility and Transfers for ADLs:  Bed Mobility:  Rolling: Minimum assistance; Moderate assistance;Assist x2  Supine to Sit: Moderate assistance;Assist x2  Sit to Supine: Moderate assistance;Assist x2  Scooting: Minimum assistance;Assist x2    Transfers:  Sit to Stand: Moderate assistance;Assist x2    Balance:  Sitting: Impaired; With support  Sitting - Static: Good (unsupported)  Sitting - Dynamic: Fair (occasional)  Standing: Impaired; With support  Standing - Static: Constant support;Poor    ADL Intervention:  Lower Body Dressing Assistance  Socks: Total assistance (dependent)    Pain:  8-9/10 in B legs at rest  8-9/10 with mobility in lower back    Activity Tolerance:   Fair and requires rest breaks  Please refer to the flowsheet for vital signs taken during this treatment. After treatment patient left in no apparent distress:   Bed locked and in lowest position  Supine in bed, Call bell within reach, Bed / chair alarm activated, and Side rails x 3    COMMUNICATION/COLLABORATION:   The patients plan of care was discussed with: Physical therapy assistant and Registered nurse. [place] with PT for increased safety for pt and clinician.     ADILSON Lopez  Time Calculation: 31 mins    Problem: Self Care Deficits Care Plan (Adult)  Goal: *Acute Goals and Plan of Care (Insert Text)  Description:   FUNCTIONAL STATUS PRIOR TO ADMISSION: Patient was modified independent using a rolling walker for functional mobility. Patient required minimal assistance for basic and instrumental ADLs. HOME SUPPORT: The patient lived alone with daughter to provide assistance. Occupational Therapy Goals  Initiated 1/4/2023  Patient Goal: Pt did not state. 1.  Patient will perform lower body dressing with minimal assistance/contact guard assist within 7 day(s). 2.  Patient will perform grooming with modified independence within 7 day(s). 3.  Patient will perform bathing with minimal assistance/contact guard assist within 7 day(s). 4.  Patient will perform toilet transfers with minimal assistance/contact guard assist within 7 day(s). 5.  Patient will perform all aspects of toileting with minimal assistance/contact guard assist within 7 day(s). 6.  Patient will participate in upper extremity therapeutic exercise/activities with independence within 7 day(s). 7.  Patient will utilize energy conservation techniques during functional activities with verbal cues within 7 day(s).   Outcome: Progressing Towards Goal

## 2023-01-06 NOTE — PROGRESS NOTES
Problem: Falls - Risk of  Goal: *Absence of Falls  Description: Document Joey Beckford Fall Risk and appropriate interventions in the flowsheet.   Outcome: Progressing Towards Goal  Note: Fall Risk Interventions:  Mobility Interventions: PT Consult for mobility concerns         Medication Interventions: Bed/chair exit alarm    Elimination Interventions: Bed/chair exit alarm, Call light in reach              Problem: Pain  Goal: *Control of Pain  Outcome: Progressing Towards Goal     Problem: General Medical Care Plan  Goal: *Skin integrity maintained  Outcome: Progressing Towards Goal

## 2023-01-06 NOTE — PROGRESS NOTES
Hospitalist Progress Note    Daily Progress Note: 1/5/2023 11:32 AM  Hospital Course     Gely Otero 78 y.o. female history of carcinoma of the lung with lobectomy in 1996, since then on follow-up with surveillance. She woke up this morning with severe lower extremity weakness and pain unable to stand up and walk. 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 She is recommended to follow-up with oncology, Dr. Jean Carlos Pineda, had MRI and PET scan scheduled January 2023. States the back pain started getting worse, its severe, given oxycodone 10 mg 3 times a day but not helping the pain. Its radiating into the legs, now with severe weakness. She is crying with pain worse with any movement. Due to the mass in the lumbar spine requested for CT of thoracic spine and lumbar spine, consult placed to radiation oncology. As she is not completely paralyzed, radiation oncology recommended to continue steroids at this time and follow-up. Dr. Britni Elliott, rad/onc, to start radiation treatment on 01/03. Oncology consult. Continue pain management inpatient for now. Spoke to patient and daughter at bedside about goals of care moving forward, and they are interested in pursing hospice at this time. Hospice consult placed. Subjective:     Back pain. Better when she is up. Started XRT. Some increased movement in leg. Discussed with daughter/patient regarding pain and DC plans. Agreeable with Fentanyl patch and Rehab. Unfortunately, after discussing with Case Mgmt, [facility] will only consider accepting after XRT completed. REVIEW OF SYSTEMS    Constitutional:  Negative for chills, fever and malaise/fatigue. HENT:  Negative for congestion and sore throat. Eyes:  Negative for blurred vision and double vision. Respiratory:  Negative for cough, sputum production, shortness of breath and wheezing. Cardiovascular:  Negative for chest pain, palpitations, orthopnea and leg swelling. Gastrointestinal:  Negative for abdominal pain, blood in stool, constipation, diarrhea, nausea and vomiting. Genitourinary:  Negative for dysuria, flank pain, frequency, hematuria and urgency. Musculoskeletal:  + back pain, + joint pain, + myalgias  Skin:  Negative for itching and rash. Neurological:  Negative for dizziness, speech change, seizures and headaches. Psychiatric/Behavioral:  Negative for depression. The patient is not nervous/anxious.       Objective:     Current Facility-Administered Medications   Medication Dose Route Frequency    fentaNYL (DURAGESIC) 25 mcg/hr patch 1 Patch  1 Patch TransDERmal Q72H    docusate sodium (COLACE) capsule 100 mg  100 mg Oral DAILY    cyclobenzaprine (FLEXERIL) tablet 10 mg  10 mg Oral TID PRN    polyethylene glycol (MIRALAX) packet 17 g  17 g Oral QHS    hydrOXYzine pamoate (VISTARIL) capsule 25 mg  25 mg Oral TID PRN    diphenhydrAMINE (BENADRYL) capsule 25 mg  25 mg Oral Q6H PRN    albuterol-ipratropium (DUO-NEB) 2.5 MG-0.5 MG/3 ML  3 mL Nebulization Q4H PRN    budesonide-formoteroL (SYMBICORT) 160-4.5 mcg/actuation HFA inhaler 1 Puff  1 Puff Inhalation BID RT    And    tiotropium bromide (SPIRIVA RESPIMAT) 2.5 mcg /actuation  2 Puff Inhalation DAILY    albuterol (PROVENTIL HFA, VENTOLIN HFA, PROAIR HFA) inhaler 2 Puff  2 Puff Inhalation R2N PRN    folic acid (FOLVITE) tablet 1 mg  1 mg Oral ACB    gabapentin (NEURONTIN) capsule 400 mg  400 mg Oral TID    pantoprazole (PROTONIX) tablet 40 mg  40 mg Oral ACB    sodium chloride (NS) flush 5-40 mL  5-40 mL IntraVENous Q8H    sodium chloride (NS) flush 5-40 mL  5-40 mL IntraVENous PRN    acetaminophen (TYLENOL) tablet 650 mg  650 mg Oral Q6H PRN    Or    acetaminophen (TYLENOL) suppository 650 mg  650 mg Rectal Q6H PRN    ondansetron (ZOFRAN ODT) tablet 4 mg  4 mg Oral Q6H PRN    Or    ondansetron (ZOFRAN) injection 4 mg  4 mg IntraVENous Q6H PRN    enoxaparin (LOVENOX) injection 40 mg  40 mg SubCUTAneous DAILY    oxyCODONE ER (OxyCONTIN) tablet 20 mg  20 mg Oral Q12H    dexamethasone (DECADRON) 4 mg/mL injection 8 mg  8 mg IntraVENous Q8H    morphine injection 2 mg  2 mg IntraVENous Q4H PRN       Visit Vitals  /64 (BP 1 Location: Left upper arm, BP Patient Position: At rest;Semi fowlers)   Pulse 74   Temp 98.3 °F (36.8 °C)   Resp 20   Ht 5' 6\" (1.676 m)   Wt 81.6 kg (180 lb)   SpO2 95%   BMI 29.05 kg/m²    O2 Flow Rate (L/min): 2 l/min O2 Device: Nasal cannula    Temp (24hrs), Av.3 °F (36.8 °C), Min:98.3 °F (36.8 °C), Max:98.3 °F (36.8 °C)      PHYSICAL EXAM:    Constitutional: No acute distress  Skin: Extremities and face reveal no rashes. HEENT: Sclerae anicteric. PERRL. No oral ulcers. The neck is supple and no masses. Cardiovascular: Regular rate and rhythm. +S1/S2. No murmur or gallop. No JVD. Respiratory:  Clear breath sounds bilaterally with no wheezes, rales, or rhonchi. GI: Abdomen nondistended, soft, and nontender. Normal active bowel sounds. Rectal: Deferred   Musculoskeletal: Difficulty lifting lower extremities s/t pain, right > left s/t pain and weakness. No pitting edema of the lower legs. Neurological:  Patient is A&Ox3. Cranial nerves II-XII grossly intact  Psychiatric: Mood and affect appropriate    Data Review    CT SPINE LUMB WO CONT   Final Result   1. No evidence of acute fracture. 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   2. 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 CT SPINE [facility] WO CONT   Final Result   1. No evidence of acute fracture. CT CHEST ABD PELV WO CONT    (Results Pending)       Assessment/Plan:     1.  Gait disturbance with lower extremity weakness  - D/t cord compression and tumor  - Continue IV dexamethasone as per radiation oncology recommendations, 10 mg of 1 dose now then 8 mg every 8 hours and monitor closely. 2. Severe back pain secondary to suspected spinal mass  - Increased the dose of the oxycodone to long-acting OxyContin 20 mg twice a day with IV morphine breakthrough pain relief. - Mass at L3 that was clearly present 2 weeks ago on MRI and appears to have progressed causing increased pain and possibly lower extremity weakness.  - Discussed with Dr. Jessica Lopez; per Spine surgeons --> very high complication risk.   - Radiation oncology consulted --> XRT started; 10 total (30 Gy in 10 fractions per Rad Onc starting on 01/03/2023)  - Add fentanyl    3. 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  COPD  - Mild with no signs of exacerbation on DuoNeb which we will continue    DVT Prophylaxis: Lovenox --> DC; high risk d/t spinal mass  GI Prophylaxis: Protonix   Code Status: Full Code  POA/NOK: patient/daugher    Dispo: Rehab after completing XRT vs home with hospice    Bee Becerril MD

## 2023-01-06 NOTE — PROGRESS NOTES
Problem: Mobility Impaired (Adult and Pediatric)  Goal: *Acute Goals and Plan of Care (Insert Text)  Description: FUNCTIONAL STATUS PRIOR TO ADMISSION: Patient was modified independent using a rolling walker for functional mobility. Patient required minimal assistance for basic and instrumental ADLs. HOME SUPPORT PRIOR TO ADMISSION: The patient lived alone with daughter to provide assistance. Physical Therapy Goals  Initiated 1/4/2023  Pt stated goal: to go home  Pt will be I with LE HEP in 7 days. Pt will perform bed mobility with mod I utilizing log roll technique in 7 days. Pt will perform transfers with mod I in 7 days. Pt will amb 25-50 feet with LRAD safely with mod I in 7 days. Pt will verbalize and demonstrate compliance with spinal precautions to include no bending, lifting or twisting in 7 days. Outcome: Progressing Towards Goal   PHYSICAL THERAPY TREATMENT  Patient: Rodriguez Pozo (58 y.o. female)  Date: 1/6/2023  Diagnosis: Acute back pain [M54.9] <principal problem not specified>      Precautions:    Chart, physical therapy assessment, plan of care and goals were reviewed. ASSESSMENT  Patient continues with skilled PT services and is slowly progressing towards goals. Pt found semi supine with daughter at bedside upon PTA/DE ANDA arrival, agreeable to session. (See below for objective details and assist levels). Overall pt tolerated session fair today with bed mobility and transfers, activity limited by increased fear of falling secondary to pain. Reviewed log rolling technique with pt prior to mobility, continues to require additional A to transfer to sitting EOB. Pt agreeable to transfer to bedside commode however afraid of ambulation and reports of inability to initiate mobility with LE. Pt able to perform sit<>stand x4-5 with mod A x2 and RW, able to maintain ~30 sec . In standing pt demo's shaking in BUE on RW. Pt returned to supine with mod A x2.  Will continue to benefit from skilled PT services, and will continue to progress as tolerated. Current Level of Function Impacting Discharge (mobility/balance): medical     Other factors to consider for discharge: PLOF and amount of A needed for mobility          PLAN :  Patient continues to benefit from skilled intervention to address the above impairments. Continue treatment per established plan of care to address goals. Recommend with staff: Encourage HEP in prep for ADLs/mobility and Frequent repositioning to prevent skin breakdown    Recommendation for discharge: (in order for the patient to meet his/her long term goals)  Tae Anand    This discharge recommendation:  Has been made in collaboration with the attending provider and/or case management    IF patient discharges home will need the following DME: to be determined (TBD)       SUBJECTIVE:   Patient stated that went better than I thought.  referencing log roll    OBJECTIVE DATA SUMMARY:   Critical Behavior:  Neurologic State: Alert  Orientation Level: Oriented X4  Cognition: Follows commands     Functional Mobility Training:  Bed Mobility:  Rolling: Minimum assistance; Moderate assistance;Assist x2  Supine to Sit: Moderate assistance;Assist x2  Sit to Supine: Moderate assistance;Assist x2  Scooting: Minimum assistance;Assist x2  Transfers:  Sit to Stand: Moderate assistance;Assist x2  Stand to Sit: Moderate assistance;Assist x2  Balance:  Sitting: Impaired; With support  Sitting - Static: Good (unsupported)  Sitting - Dynamic: Fair (occasional)  Standing: Impaired; With support  Standing - Static: Constant support;Poor  Ambulation/Gait Training:       Pain Ratin-9/10 in BLE at rest, 8-9/10 in lumbar spine with mobility    Activity Tolerance:   Fair, requires frequent rest breaks, and observed SOB with activity    After treatment patient left in no apparent distress:   Bed locked and returned to lowest position, Supine in bed and Call bell within reach    COMMUNICATION/COLLABORATION:   The patients plan of care was discussed with: Occupational therapy assistant and Registered nurse.      PTA/DE ANDA cotreat to maximize pt and clinician safety     Miguelina Falk PTA, PT   Time Calculation: 31 mins

## 2023-01-06 NOTE — PROGRESS NOTES
Patient off the unit for Radiation Treatment transferred on stretcher. Alert, verbal, denies any complaints.

## 2023-01-06 NOTE — PROGRESS NOTES
Problem: Falls - Risk of  Goal: *Absence of Falls  Description: Document Norman Duron Fall Risk and appropriate interventions in the flowsheet. Outcome: Progressing Towards Goal  Note: Fall Risk Interventions:  Mobility Interventions: PT Consult for mobility concerns         Medication Interventions: Bed/chair exit alarm    Elimination Interventions: Bed/chair exit alarm, Call light in reach              Problem: Patient Education: Go to Patient Education Activity  Goal: Patient/Family Education  Outcome: Progressing Towards Goal     Problem: Pain  Goal: *Control of Pain  Outcome: Progressing Towards Goal     Problem: Patient Education: Go to Patient Education Activity  Goal: Patient/Family Education  Outcome: Progressing Towards Goal     Problem: General Medical Care Plan  Goal: *Vital signs within specified parameters  Outcome: Progressing Towards Goal  Goal: *Labs within defined limits  Outcome: Progressing Towards Goal  Goal: *Absence of infection signs and symptoms  Outcome: Progressing Towards Goal  Goal: *Optimal pain control at patient's stated goal  Outcome: Progressing Towards Goal  Goal: *Skin integrity maintained  Outcome: Progressing Towards Goal  Goal: *Fluid volume balance  Outcome: Progressing Towards Goal  Goal: *Optimize nutritional status  Outcome: Progressing Towards Goal  Goal: *Anxiety reduced or absent  Outcome: Progressing Towards Goal  Goal: *Progressive mobility and function (eg: ADL's)  Outcome: Progressing Towards Goal     Problem: Patient Education: Go to Patient Education Activity  Goal: Patient/Family Education  Outcome: Progressing Towards Goal     Problem: Patient Education: Go to Patient Education Activity  Goal: Patient/Family Education  Outcome: Progressing Towards Goal     Problem: Pressure Injury - Risk of  Goal: *Prevention of pressure injury  Description: Document Al Scale and appropriate interventions in the flowsheet.   Outcome: Progressing Towards Goal     Problem: Patient Education: Go to Patient Education Activity  Goal: Patient/Family Education  Outcome: Progressing Towards Goal     Problem: Patient Education: Go to Patient Education Activity  Goal: Patient/Family Education  Outcome: Progressing Towards Goal     Problem: Patient Education: Go to Patient Education Activity  Goal: Patient/Family Education  Outcome: Progressing Towards Goal

## 2023-01-06 NOTE — PROGRESS NOTES
Hematology/Oncology progress notes. JACOB Shafer is a 78 y.o. female who is being seen for  L3 intradural mass. She is a patient of Dr. Ngoc Urrutia and has metastatic non-small cell lung cancer for which she is being followed since 2013. Over the course of these past 10 years she has received multiple treatments including surgical resection, CarboTaxol radiation, carboplatin Alimta, Taxotere & Opdivo. She had complete remission in 2017 at which time all treatments were stopped. She had a most recent PET scan in September which did not show any clear-cut metastatic disease or recurrence. She developed some low back pain in October and subsequently was seen by orthopedics and obtained an MRI of the lumbar spine mid December. On this MRI she is noted to have an intradural mass posterior to L3 feeling the thecal sac there was a concern for intradural metastasis. The spinal cord terminates normally posterior to T12-L1 ; the soft tissue mass filling the thecal sac posterior to L3 measures 3 cm. She was ordered complete staging work-up again. However she developed progressively worsening low back pain radiating down her right leg along with the development of weakness in her legs. This prompted her to come to the hospital on December 30. Radiation oncology was consulted and was started on IV steroids. She received her first dose of radiation this morning. Medical oncology is consulted for further recommendations today. Interval history: Receiving day 4 of radiation today. Her daughter is at bedside. Her pain is much better. Right leg weakness is not much changed. Left leg has improved strength. They chose Rehab , but transportation to radiation is not available as per Family . So she may have to stay in the hospital for this.  .    Current Facility-Administered Medications   Medication Dose Route Frequency Provider Last Rate Last Admin    fentaNYL (DURAGESIC) 25 mcg/hr patch 1 Patch 1 Patch TransDERmal Q72H Ottoniel Barber MD   1 Patch at 01/05/23 2141    docusate sodium (COLACE) capsule 100 mg  100 mg Oral DAILY Yamil Herrera PA-C   100 mg at 01/06/23 0810    cyclobenzaprine (FLEXERIL) tablet 10 mg  10 mg Oral TID PRN Yamil Herrera PA-C   10 mg at 01/05/23 2206    polyethylene glycol (MIRALAX) packet 17 g  17 g Oral QHS Yamil Herrera PA-C   17 g at 01/05/23 2143    hydrOXYzine pamoate (VISTARIL) capsule 25 mg  25 mg Oral TID PRN Yamil Herrera PA-C        diphenhydrAMINE (BENADRYL) capsule 25 mg  25 mg Oral Q6H PRN Yamil Herrera PA-C   25 mg at 01/02/23 0158    albuterol-ipratropium (DUO-NEB) 2.5 MG-0.5 MG/3 ML  3 mL Nebulization Q4H PRN Kellie Ibrahim MD        budesonide-formoteroL (SYMBICORT) 160-4.5 mcg/actuation HFA inhaler 1 Puff  1 Puff Inhalation BID RT Yuliya Fierro MD   1 Puff at 01/06/23 1031    And    tiotropium bromide (SPIRIVA RESPIMAT) 2.5 mcg /actuation  2 Puff Inhalation DAILY Yuliya Fierro MD   2 Puff at 01/05/23 0830    albuterol (PROVENTIL HFA, VENTOLIN HFA, PROAIR HFA) inhaler 2 Puff  2 Puff Inhalation Q6H PRN Kellie Ibrahim MD        folic acid (FOLVITE) tablet 1 mg  1 mg Oral ACZINA Ibrahim MD   1 mg at 01/06/23 0810    gabapentin (NEURONTIN) capsule 400 mg  400 mg Oral TID Kellie Ibrahim MD   400 mg at 01/06/23 0810    pantoprazole (PROTONIX) tablet 40 mg  40 mg Oral JAH Ibrahim MD   40 mg at 01/06/23 0810    sodium chloride (NS) flush 5-40 mL  5-40 mL IntraVENous Q8H Kellie Ibrahim MD   10 mL at 01/06/23 1450    sodium chloride (NS) flush 5-40 mL  5-40 mL IntraVENous PRN Kellie Ibrahim MD        acetaminophen (TYLENOL) tablet 650 mg  650 mg Oral Q6H PRN Kellie Ibrahim MD   650 mg at 01/03/23 1647    Or    acetaminophen (TYLENOL) suppository 650 mg  650 mg Rectal Q6H PRN Kellie Ibrahim MD        ondansetron (ZOFRAN ODT) tablet 4 mg  4 mg Oral Q6H PRN Vernon Pfeiffer MD        Or    ondansetron [facility]) injection 4 mg  4 mg IntraVENous Q6H PRN Vernon Pfeiffer MD        oxyCODONE ER (OxyCONTIN) tablet 20 mg  20 mg Oral Q12H Vernon Pfeiffer MD   20 mg at 01/06/23 0810    dexamethasone (DECADRON) 4 mg/mL injection 8 mg  8 mg IntraVENous Q8H Vernon Pfeiffer MD   8 mg at 01/06/23 1435    morphine injection 2 mg  2 mg IntraVENous Q4H PRN Vernon Pfeiffer MD   2 mg at 01/06/23 1428        Allergies   Allergen Reactions    Contrast Agent [Iodine] Rash    Adhesive Tape-Silicones Itching and Contact Dermatitis       Review of Systems:  Constitutional No fevers, chills, night sweats, excessive fatigue or weight loss. Allergic/Immunologic No recent allergic reactions   Eyes No significant visual difficulties. No diplopia. ENMT No problems with hearing, no sore throat, no sinus drainage. Endocrine No hot flashes or night sweats. No cold intolerance, polyuria, or polydipsia   Hematologic/Lymphatic No easy bruising or bleeding. The patient denies any tender or palpable lymph nodes   Breasts No abnormal masses of breast, nipple discharge or pain. Respiratory No dyspnea on exertion, orthopnea, chest pain, cough or hemoptysis. Cardiovascular No anginal chest pain, irregular heart beat, tachycardia, palpitations or orthopnea. Gastrointestinal No nausea, vomiting, diarrhea, constipation, cramping, dysphagia, reflux, heartburn, GI bleeding, or early satiety. No change in bowel habits. Genitourinary (M) No hematuria, dysuria, increased frequency, urgency, hesitancy or incontinence. Musculoskeletal Severe low back pain radiating down the right leg and weakness of the legs   Integumentary No chronic rashes, inflammation, ulcerations, pruritus, petechiae, purpura, ecchymoses, or skin changes.    Neurologic No headache, blurred vision, and full: Weakness of bilateral legs right worse than the left   Psychiatric No insomnia, depression, bjorn or mood swings. No psychotropic drugs. Objective:     Vitals:    01/06/23 0256 01/06/23 0824 01/06/23 0828 01/06/23 1633   BP: (!) 149/76 (!) 148/73  126/70   Pulse: 69 62  67   Resp: 18 18  18   Temp: 98 °F (36.7 °C) 97.5 °F (36.4 °C)  98.2 °F (36.8 °C)   SpO2: 96% 96% 94% 98%   Weight:       Height:            Physical Exam:  Constitutional Elderly white female , Alert, cooperative, oriented. Mood and affect appropriate. Appears close to chronological age. Well nourished. Well developed. Head Normocephalic; no scars   Eyes Conjunctivae and sclerae are clear and without icterus. Pupils are reactive and equal.   ENMT Sinuses are nontender. No oral exudates, ulcers, masses, thrush or mucositis. Oropharynx clear. Tongue normal.   Neck Supple without masses or thyromegaly. No jugular venous distension. Hematologic/Lymphatic No petechiae or purpura. No tender or palpable lymph nodes in the cervical, supraclavicular, axillary or inguinal area. Respiratory Lungs are clear to auscultation without rhonchi or wheezing. Cardiovascular Regular rate and rhythm of heart without murmurs, gallops or rubs. Chest / Line Site Chest is symmetric with no chest wall deformities. Abdomen Non-tender, non-distended, no masses, ascites or hepatosplenomegaly. Good bowel sounds. No guarding or rebound tenderness. No pulsatile masses. Musculoskeletal No tenderness or swelling, normal range of motion without obvious weakness. Extremities No visible deformities, no cyanosis, clubbing or edema. Skin No rashes, scars, or lesions suggestive of malignancy. No petechiae, purpura, or ecchymoses. No excoriations. Neurologic Bilateral lower extremity weakness 2/5 right greater than left   Psychiatric Alert and oriented times three. Coherent speech. Verbalizes understanding of our discussions today.      Lab/Data Review:  Recent Labs     01/06/23  0836   WBC 7.4   HGB 13.0   HCT 40.6    Recent Labs     01/06/23  0836      K 4.4   CL 97   CO2 33*   *   BUN 28*   CREA 0.50*   CA 9.2   MG 2.3   ALB 3.0*   TBILI 0.6   ALT 60       No results for input(s): PH, PCO2, PO2, HCO3, FIO2 in the last 72 hours. Recent Results (from the past 24 hour(s))   CBC WITH AUTOMATED DIFF    Collection Time: 01/06/23  8:36 AM   Result Value Ref Range    WBC 7.4 3.6 - 11.0 K/uL    RBC 4.24 3.80 - 5.20 M/uL    HGB 13.0 11.5 - 16.0 g/dL    HCT 40.6 35.0 - 47.0 %    MCV 95.8 80.0 - 99.0 FL    MCH 30.7 26.0 - 34.0 PG    MCHC 32.0 30.0 - 36.5 g/dL    RDW 13.5 11.5 - 14.5 %    PLATELET 123 988 - 143 K/uL    MPV 9.7 8.9 - 12.9 FL    NRBC 0.0 0.0  WBC    ABSOLUTE NRBC 0.00 0.00 - 0.01 K/uL    NEUTROPHILS 83 (H) 32 - 75 %    LYMPHOCYTES 10 (L) 12 - 49 %    MONOCYTES 6 5 - 13 %    EOSINOPHILS 0 0 - 7 %    BASOPHILS 0 0 - 1 %    IMMATURE GRANULOCYTES 1 (H) 0 - 0.5 %    ABS. NEUTROPHILS 6.1 1.8 - 8.0 K/UL    ABS. LYMPHOCYTES 0.8 0.8 - 3.5 K/UL    ABS. MONOCYTES 0.5 0.0 - 1.0 K/UL    ABS. EOSINOPHILS 0.0 0.0 - 0.4 K/UL    ABS. BASOPHILS 0.0 0.0 - 0.1 K/UL    ABS. IMM. GRANS. 0.1 (H) 0.00 - 0.04 K/UL    DF AUTOMATED     METABOLIC PANEL, COMPREHENSIVE    Collection Time: 01/06/23  8:36 AM   Result Value Ref Range    Sodium 136 136 - 145 mmol/L    Potassium 4.4 3.5 - 5.1 mmol/L    Chloride 97 97 - 108 mmol/L    CO2 33 (H) 21 - 32 mmol/L    Anion gap 6 5 - 15 mmol/L    Glucose 112 (H) 65 - 100 mg/dL    BUN 28 (H) 6 - 20 mg/dL    Creatinine 0.50 (L) 0.55 - 1.02 mg/dL    BUN/Creatinine ratio 56 (H) 12 - 20      eGFR >60 >60 ml/min/1.73m2    Calcium 9.2 8.5 - 10.1 mg/dL    Bilirubin, total 0.6 0.2 - 1.0 mg/dL    AST (SGOT) 15 15 - 37 U/L    ALT (SGPT) 60 12 - 78 U/L    Alk.  phosphatase 56 45 - 117 U/L    Protein, total 6.6 6.4 - 8.2 g/dL    Albumin 3.0 (L) 3.5 - 5.0 g/dL    Globulin 3.6 2.0 - 4.0 g/dL    A-G Ratio 0.8 (L) 1.1 - 2.2     MAGNESIUM    Collection Time: 01/06/23  8:36 AM   Result Value Ref Range Magnesium 2.3 1.6 - 2.4 mg/dL        No results found. Assessment and Plan:     Hospital Problems  Date Reviewed: 12/30/2022            Codes Class Noted POA    Acute back pain ICD-10-CM: M54.9  ICD-9-CM: 724.5  12/30/2022 Yes        Lower extremity weakness ICD-10-CM: R29.898  ICD-9-CM: 729.89  12/30/2022 Yes        Gait disturbance ICD-10-CM: R26.9  ICD-9-CM: 781.2  12/30/2022 Yes       1. Metastatic non-small cell lung cancer,adenocarcinoma for which she is being treated since 2013. Over the course of these past 10 years she has received multiple treatments including surgical resection, CarboTaxol radiation, carboplatin Alimta, Taxotere & Opdivo. She had complete remission in 2017 at which time all treatments were stopped. -She did well until 10/2022 when she developed back pain with subsequent work up showing L3 intradural mass. _Obtain ct chest abdomen and pelvis to stage her disease now. She is allergic to iv dye and so non contrast imaging is being ordered. She however could not lay flat and so these are delayed. 2. L3 intradural mass: occupying the thecal sac. -Pain and weakness of Legs , rt >left.   -Started urgent radiation 1/3/22. Tolerating well and having signficant pain relief. Strength and weakness is not much improved. -continue steroids iv .   -continue Ppi.  -continue pain management.        Signed By: Neelam Covington MD     January 6, 2023

## 2023-01-07 PROCEDURE — 74011250636 HC RX REV CODE- 250/636: Performed by: HOSPITALIST

## 2023-01-07 PROCEDURE — 94640 AIRWAY INHALATION TREATMENT: CPT

## 2023-01-07 PROCEDURE — 74011250637 HC RX REV CODE- 250/637: Performed by: INTERNAL MEDICINE

## 2023-01-07 PROCEDURE — 74011250637 HC RX REV CODE- 250/637: Performed by: HOSPITALIST

## 2023-01-07 PROCEDURE — 94761 N-INVAS EAR/PLS OXIMETRY MLT: CPT

## 2023-01-07 PROCEDURE — 65270000029 HC RM PRIVATE

## 2023-01-07 PROCEDURE — 77010033678 HC OXYGEN DAILY

## 2023-01-07 PROCEDURE — 74011000250 HC RX REV CODE- 250: Performed by: HOSPITALIST

## 2023-01-07 PROCEDURE — 74011250637 HC RX REV CODE- 250/637: Performed by: STUDENT IN AN ORGANIZED HEALTH CARE EDUCATION/TRAINING PROGRAM

## 2023-01-07 RX ORDER — FENTANYL 25 UG/1
2 PATCH TRANSDERMAL
Status: DISCONTINUED | OUTPATIENT
Start: 2023-01-07 | End: 2023-01-18 | Stop reason: HOSPADM

## 2023-01-07 RX ORDER — OXYCODONE HYDROCHLORIDE 5 MG/1
10 TABLET ORAL
Status: DISCONTINUED | OUTPATIENT
Start: 2023-01-07 | End: 2023-01-09

## 2023-01-07 RX ORDER — ALPRAZOLAM 0.25 MG/1
0.25 TABLET ORAL
Status: DISCONTINUED | OUTPATIENT
Start: 2023-01-07 | End: 2023-01-18 | Stop reason: HOSPADM

## 2023-01-07 RX ADMIN — GABAPENTIN 400 MG: 400 CAPSULE ORAL at 17:01

## 2023-01-07 RX ADMIN — TIOTROPIUM BROMIDE INHALATION SPRAY 2 PUFF: 3.12 SPRAY, METERED RESPIRATORY (INHALATION) at 07:26

## 2023-01-07 RX ADMIN — DOCUSATE SODIUM 100 MG: 100 CAPSULE, LIQUID FILLED ORAL at 08:10

## 2023-01-07 RX ADMIN — POLYETHYLENE GLYCOL 3350 17 G: 17 POWDER, FOR SOLUTION ORAL at 21:49

## 2023-01-07 RX ADMIN — CYCLOBENZAPRINE 10 MG: 10 TABLET, FILM COATED ORAL at 11:38

## 2023-01-07 RX ADMIN — FOLIC ACID 1 MG: 1 TABLET ORAL at 08:10

## 2023-01-07 RX ADMIN — GABAPENTIN 400 MG: 400 CAPSULE ORAL at 21:48

## 2023-01-07 RX ADMIN — BUDESONIDE AND FORMOTEROL FUMARATE DIHYDRATE 1 PUFF: 160; 4.5 AEROSOL RESPIRATORY (INHALATION) at 19:32

## 2023-01-07 RX ADMIN — DEXAMETHASONE SODIUM PHOSPHATE 8 MG: 4 INJECTION, SOLUTION INTRA-ARTICULAR; INTRALESIONAL; INTRAMUSCULAR; INTRAVENOUS; SOFT TISSUE at 05:58

## 2023-01-07 RX ADMIN — DEXAMETHASONE SODIUM PHOSPHATE 8 MG: 4 INJECTION, SOLUTION INTRA-ARTICULAR; INTRALESIONAL; INTRAMUSCULAR; INTRAVENOUS; SOFT TISSUE at 21:48

## 2023-01-07 RX ADMIN — DEXAMETHASONE SODIUM PHOSPHATE 8 MG: 4 INJECTION, SOLUTION INTRA-ARTICULAR; INTRALESIONAL; INTRAMUSCULAR; INTRAVENOUS; SOFT TISSUE at 14:28

## 2023-01-07 RX ADMIN — MORPHINE SULFATE 2 MG: 2 INJECTION, SOLUTION INTRAMUSCULAR; INTRAVENOUS at 05:59

## 2023-01-07 RX ADMIN — CYCLOBENZAPRINE 10 MG: 10 TABLET, FILM COATED ORAL at 21:48

## 2023-01-07 RX ADMIN — OXYCODONE HYDROCHLORIDE 10 MG: 5 TABLET ORAL at 17:05

## 2023-01-07 RX ADMIN — OXYCODONE HYDROCHLORIDE 20 MG: 20 TABLET, FILM COATED, EXTENDED RELEASE ORAL at 08:11

## 2023-01-07 RX ADMIN — GABAPENTIN 400 MG: 400 CAPSULE ORAL at 08:10

## 2023-01-07 RX ADMIN — PANTOPRAZOLE SODIUM 40 MG: 40 TABLET, DELAYED RELEASE ORAL at 08:11

## 2023-01-07 RX ADMIN — SODIUM CHLORIDE, PRESERVATIVE FREE 10 ML: 5 INJECTION INTRAVENOUS at 14:38

## 2023-01-07 RX ADMIN — SODIUM CHLORIDE, PRESERVATIVE FREE 10 ML: 5 INJECTION INTRAVENOUS at 21:49

## 2023-01-07 RX ADMIN — BUDESONIDE AND FORMOTEROL FUMARATE DIHYDRATE 1 PUFF: 160; 4.5 AEROSOL RESPIRATORY (INHALATION) at 07:26

## 2023-01-07 RX ADMIN — SODIUM CHLORIDE, PRESERVATIVE FREE 10 ML: 5 INJECTION INTRAVENOUS at 06:01

## 2023-01-07 NOTE — PROGRESS NOTES
Hospitalist Progress Note         Lizeth Cueva MD          Daily Progress Note: 1/7/2023      Subjective: The patient is seen for follow  up. Gely Otero 78 y.o. female history of carcinoma of the lung with lobectomy in 1996, since then on follow-up with surveillance. She woke up this morning with severe lower extremity weakness and pain unable to stand up and walk. 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 She is recommended to follow-up with oncology, . Missy Mortimer, had MRI and PET scan scheduled January 2023. States the back pain started getting worse, its severe, given oxycodone 10 mg 3 times a day but not helping the pain. Its radiating into the legs, now with severe weakness. She is crying with pain worse with any movement. Due to the mass in the lumbar spine requested for CT of thoracic spine and lumbar spine, consult placed to radiation oncology. As she is not completely paralyzed, radiation oncology recommended to continue steroids at this time and follow-up. Dr. Gilbert Srivastava, rad/onc, to start radiation treatment on 01/03. Oncology consult. Continue pain management inpatient for now. Family interested in pursing hospice at this time. Hospice consult placed.      Problem List:  Problem List as of 1/7/2023 Date Reviewed: 12/30/2022            Codes Class Noted - Resolved    Acute back pain ICD-10-CM: M54.9  ICD-9-CM: 724.5  12/30/2022 - Present        Lower extremity weakness ICD-10-CM: R29.898  ICD-9-CM: 729.89  12/30/2022 - Present        Gait disturbance ICD-10-CM: R26.9  ICD-9-CM: 781.2  12/30/2022 - Present        Bronchitis ICD-10-CM: J40  ICD-9-CM: 484  11/16/2021 - Present        Sepsis ([facility] [address] ICD-10-CM: A41.9  ICD-9-CM: 038.9, 995.91  11/13/2021 - Present        Acute and chronic respiratory failure with hypoxia ([facility] [address] ICD-10-CM: J96.21  ICD-9-CM: 518.84, 799.02  11/13/2021 - Present        Lung cancer [facility]) ICD-10-CM: C34.90  ICD-9-CM: 162.9  4/29/2014 - Present        Dyslipidemia ICD-10-CM: E78.5  ICD-9-CM: 272.4  4/29/2014 - Present           Medications reviewed  Current Facility-Administered Medications   Medication Dose Route Frequency    fentaNYL (DURAGESIC) 25 mcg/hr patch 2 Patch  2 Patch TransDERmal Q72H    oxyCODONE IR (ROXICODONE) tablet 10 mg  10 mg Oral Q8H PRN    ALPRAZolam (XANAX) tablet 0.25 mg  0.25 mg Oral TID PRN    docusate sodium (COLACE) capsule 100 mg  100 mg Oral DAILY    cyclobenzaprine (FLEXERIL) tablet 10 mg  10 mg Oral TID PRN    polyethylene glycol (MIRALAX) packet 17 g  17 g Oral QHS    hydrOXYzine pamoate (VISTARIL) capsule 25 mg  25 mg Oral TID PRN    diphenhydrAMINE (BENADRYL) capsule 25 mg  25 mg Oral Q6H PRN    albuterol-ipratropium (DUO-NEB) 2.5 MG-0.5 MG/3 ML  3 mL Nebulization Q4H PRN    budesonide-formoteroL (SYMBICORT) 160-4.5 mcg/actuation HFA inhaler 1 Puff  1 Puff Inhalation BID RT    And    tiotropium bromide (SPIRIVA RESPIMAT) 2.5 mcg /actuation  2 Puff Inhalation DAILY    albuterol (PROVENTIL HFA, VENTOLIN HFA, PROAIR HFA) inhaler 2 Puff  2 Puff Inhalation B5F PRN    folic acid (FOLVITE) tablet 1 mg  1 mg Oral ACB    gabapentin (NEURONTIN) capsule 400 mg  400 mg Oral TID    pantoprazole (PROTONIX) tablet 40 mg  40 mg Oral ACB    sodium chloride (NS) flush 5-40 mL  5-40 mL IntraVENous Q8H    sodium chloride (NS) flush 5-40 mL  5-40 mL IntraVENous PRN    acetaminophen (TYLENOL) tablet 650 mg  650 mg Oral Q6H PRN    Or    acetaminophen (TYLENOL) suppository 650 mg  650 mg Rectal Q6H PRN    ondansetron (ZOFRAN ODT) tablet 4 mg  4 mg Oral Q6H PRN    Or    ondansetron (ZOFRAN) injection 4 mg  4 mg IntraVENous Q6H PRN    dexamethasone (DECADRON) 4 mg/mL injection 8 mg  8 mg IntraVENous Q8H       Review of Systems:   A comprehensive review of systems was negative except for that written in the HPI.    Objective:   Physical Exam:     Visit Vitals  /67 (BP 1 Location: Left upper arm, BP Patient Position: At rest)   Pulse 63   Temp 97.8 °F (36.6 °C)   Resp 18   Ht 5' 5.98\" (1.676 m)   Wt 81.6 kg (180 lb)   SpO2 96%   BMI 29.07 kg/m²    O2 Flow Rate (L/min): 2 l/min O2 Device: Nasal cannula    Temp (24hrs), Av.9 °F (36.6 °C), Min:97.8 °F (36.6 °C), Max:98.2 °F (36.8 °C)    No intake/output data recorded.  1901 -  0700  In: -   Out: 900 [Urine:900]    General:  Alert, cooperative, no distress, appears stated age. Lungs:   Clear to auscultation bilaterally. Chest wall:  No tenderness or deformity. Heart:  Regular rate and rhythm, S1, S2 normal, no murmur, click, rub or gallop. Abdomen:   Soft, non-tender. Bowel sounds normal. No masses,  No organomegaly. Extremities: Extremities normal, atraumatic, no cyanosis or edema. Pulses: 2+ and symmetric all extremities. Skin: Skin color, texture, turgor normal. No rashes or lesions   Neurologic: CNII-XII intact. No gross sensory or motor deficits     Data Review:       Recent Days:  Recent Labs     23  0836   WBC 7.4   HGB 13.0   HCT 40.6        Recent Labs     23  0836      K 4.4   CL 97   CO2 33*   *   BUN 28*   CREA 0.50*   CA 9.2   MG 2.3   ALB 3.0*   TBILI 0.6   ALT 60     No results for input(s): PH, PCO2, PO2, HCO3, FIO2 in the last 72 hours. 24 Hour Results:  No results found for this or any previous visit (from the past 24 hour(s)). Assessment/     1. Gait disturbance with lower extremity weakness  - D/t cord compression and tumor  - Continue IV dexamethasone as per radiation oncology recommendations, 10 mg of 1 dose now then 8 mg every 8 hours and monitor closely. 2. Severe back pain secondary to suspected spinal mass  - Increased the dose of the oxycodone to long-acting OxyContin 20 mg twice a day with IV morphine breakthrough pain relief.   - Mass at L3 that was clearly present 2 weeks ago on MRI and appears to have progressed causing increased pain and possibly lower extremity weakness.  - Discussed with Dr. Jessica Lopez; per Spine surgeons --> very high complication risk.   - Radiation oncology consulted --> XRT started; 10 total (30 Gy in 10 fractions per Rad Onc starting on 01/03/2023)  - Add fentanyl --> increase to 50mcg; change other meds to PRN  - Add xanax prn    3. 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 COPD  - Mild with no signs of exacerbation on DuoNeb which we will continue      Plan:  Continue supportive care pending hospice evaluation  Will benefit from home hospice care once completed    Care Plan discussed with: Patient/Family    Total time spent with patient: 30 minutes.     Hubert Keller MD

## 2023-01-07 NOTE — PROGRESS NOTES
Problem: Falls - Risk of  Goal: *Absence of Falls  Description: Document Jolayne Levels Fall Risk and appropriate interventions in the flowsheet.   Outcome: Progressing Towards Goal  Note: Fall Risk Interventions:  Mobility Interventions: PT Consult for mobility concerns         Medication Interventions: Bed/chair exit alarm    Elimination Interventions: Bed/chair exit alarm, Call light in reach              Problem: Patient Education: Go to Patient Education Activity  Goal: Patient/Family Education  Outcome: Progressing Towards Goal     Problem: Pain  Goal: *Control of Pain  Outcome: Progressing Towards Goal

## 2023-01-07 NOTE — PROGRESS NOTES
Problem: Falls - Risk of  Goal: *Absence of Falls  Description: Document Dennie Rous Fall Risk and appropriate interventions in the flowsheet. Outcome: Progressing Towards Goal  Note: Fall Risk Interventions:  Mobility Interventions: PT Consult for mobility concerns         Medication Interventions: Bed/chair exit alarm    Elimination Interventions: Bed/chair exit alarm, Call light in reach              Problem: Patient Education: Go to Patient Education Activity  Goal: Patient/Family Education  Outcome: Progressing Towards Goal     Problem: Pain  Goal: *Control of Pain  Outcome: Progressing Towards Goal     Problem: Patient Education: Go to Patient Education Activity  Goal: Patient/Family Education  Outcome: Progressing Towards Goal     Problem: General Medical Care Plan  Goal: *Vital signs within specified parameters  Outcome: Progressing Towards Goal  Goal: *Labs within defined limits  Outcome: Progressing Towards Goal  Goal: *Absence of infection signs and symptoms  Outcome: Progressing Towards Goal  Goal: *Optimal pain control at patient's stated goal  Outcome: Progressing Towards Goal  Goal: *Skin integrity maintained  Outcome: Progressing Towards Goal  Goal: *Fluid volume balance  Outcome: Progressing Towards Goal  Goal: *Optimize nutritional status  Outcome: Progressing Towards Goal  Goal: *Anxiety reduced or absent  Outcome: Progressing Towards Goal  Goal: *Progressive mobility and function (eg: ADL's)  Outcome: Progressing Towards Goal     Problem: Patient Education: Go to Patient Education Activity  Goal: Patient/Family Education  Outcome: Progressing Towards Goal     Problem: Patient Education: Go to Patient Education Activity  Goal: Patient/Family Education  Outcome: Progressing Towards Goal     Problem: Pressure Injury - Risk of  Goal: *Prevention of pressure injury  Description: Document Al Scale and appropriate interventions in the flowsheet.   Outcome: Progressing Towards Goal     Problem: Patient Education: Go to Patient Education Activity  Goal: Patient/Family Education  Outcome: Progressing Towards Goal     Problem: Patient Education: Go to Patient Education Activity  Goal: Patient/Family Education  Outcome: Progressing Towards Goal     Problem: Patient Education: Go to Patient Education Activity  Goal: Patient/Family Education  Outcome: Progressing Towards Goal

## 2023-01-07 NOTE — PROGRESS NOTES
Nutrition Assessment     Type and Reason for Visit: (P) RD nutrition re-screen/LOS    Nutrition Recommendations/Plan:   Continue current diet  Ensure enlive 2x/day  Monitor and record PO intakes, supplement acceptance, and Bms in I/Os     Nutrition Assessment:  (P) Admitted for acute back pain, +L3 intradural mass, began radiation. No hx of significant weight loss, appetite/intakes at baseline. Will add ONS to help meet needs in setting of hypermetabolic disease. Labs: Na 136, K 4.4, BUN 28, Creat 0.5, Gluc 112, Alb 3.0. Meds: dexamethasone, docusate, folic acid, pantoprazole, polyethylene glycol    Malnutrition Assessment:  Malnutrition Status: No malnutrition     Nutrition Related Findings:  (P) No acute findings per NFPE. No n/v, d/c, or problems chewing/swallowing. No edema. BM 1/4.     Current Nutrition Therapies:  ADULT DIET Regular    Anthropometric Measures:  Height:  (P) 5' 5.98\" (167.6 cm)  Current Body Wt:  (P) 81.6 kg (179 lb 14.3 oz)  BMI: (P) 29    Nutrition Diagnosis:   (P) No nutrition diagnosis at this time     Nutrition Interventions:   Food and/or Nutrient Delivery: (P) Continue current diet  Nutrition Education/Counseling: (P) No recommendations at this time  Coordination of Nutrition Care: (P) Continue to monitor while inpatient    Nutrition Monitoring and Evaluation:   Behavioral-Environmental Outcomes: (P) None identified  Food/Nutrient Intake Outcomes: (P) Food and nutrient intake  Physical Signs/Symptoms Outcomes: (P) Meal time behavior, Weight    Discharge Planning:    (P) Too soon to determine    Sunshine Hatch RD  Contact: [phone]

## 2023-01-07 NOTE — PROGRESS NOTES
Hematology/Oncology progress notes. JACOB Lock is a 78 y.o. female who is being seen for  L3 intradural mass. She is a patient of Dr. Stefan Chavez and has metastatic non-small cell lung cancer for which she is being followed since 2013. Over the course of these past 10 years she has received multiple treatments including surgical resection, CarboTaxol radiation, carboplatin Alimta, Taxotere & Opdivo. She had complete remission in 2017 at which time all treatments were stopped. She had a most recent PET scan in September which did not show any clear-cut metastatic disease or recurrence. She developed some low back pain in October and subsequently was seen by orthopedics and obtained an MRI of the lumbar spine mid December. On this MRI she is noted to have an intradural mass posterior to L3 feeling the thecal sac there was a concern for intradural metastasis. The spinal cord terminates normally posterior to T12-L1 ; the soft tissue mass filling the thecal sac posterior to L3 measures 3 cm. She was ordered complete staging work-up again. However she developed progressively worsening low back pain radiating down her right leg along with the development of weakness in her legs. This prompted her to come to the hospital on December 30. Radiation oncology was consulted and was started on IV steroids. She received her first dose of radiation this morning. Medical oncology is consulted for further recommendations today. Interval history: Received day 4 of radiation yesterday reports her leg pains and back pain has significantly improved. Pain now comes on only with movement. The right lower extremity weakness has not shown much improvement remains at the strength of 1-2 out of 5. They chose Rehab , but transportation to radiation is not available as per Family . So she may have to stay in the hospital for this.   As per discussion it appears like she has to stay in the hospital until she completes her radiation treatments.     Current Facility-Administered Medications   Medication Dose Route Frequency Provider Last Rate Last Admin    fentaNYL (DURAGESIC) 25 mcg/hr patch 2 Patch  2 Patch TransDERmal Q72H Tru Childs MD   2 Patch at 01/07/23 1116    oxyCODONE IR (ROXICODONE) tablet 10 mg  10 mg Oral Q8H PRN Tru Childs MD        ALPRAZolam Ozie Cristinhead) tablet 0.25 mg  0.25 mg Oral TID PRN Tru Childs MD        docusate sodium (COLACE) capsule 100 mg  100 mg Oral DAILY JACE Butler-C   100 mg at 01/07/23 0810    cyclobenzaprine (FLEXERIL) tablet 10 mg  10 mg Oral TID PRN Mac Diallo PA-C   10 mg at 01/07/23 1138    polyethylene glycol (MIRALAX) packet 17 g  17 g Oral QHS RAYMOND ButlerC   17 g at 01/06/23 2131    hydrOXYzine pamoate (VISTARIL) capsule 25 mg  25 mg Oral TID PRN Mac Diallo PA-C        diphenhydrAMINE (BENADRYL) capsule 25 mg  25 mg Oral Q6H PRN Mac Diallo PA-C   25 mg at 01/02/23 0158    albuterol-ipratropium (DUO-NEB) 2.5 MG-0.5 MG/3 ML  3 mL Nebulization Q4H PRN Sergio Lindquist MD        budesonide-formoteroL (SYMBICORT) 160-4.5 mcg/actuation HFA inhaler 1 Puff  1 Puff Inhalation BID RT Iver Apley, MD   1 Puff at 01/07/23 0726    And    tiotropium bromide (SPIRIVA RESPIMAT) 2.5 mcg /actuation  2 Puff Inhalation DAILY Iver Apley, MD   2 Puff at 01/07/23 0726    albuterol (PROVENTIL HFA, VENTOLIN HFA, PROAIR HFA) inhaler 2 Puff  2 Puff Inhalation Q6H PRN Sergio Lindquist MD        folic acid (FOLVITE) tablet 1 mg  1 mg Oral ACB Sergio Lindquist MD   1 mg at 01/07/23 0810    gabapentin (NEURONTIN) capsule 400 mg  400 mg Oral TID Sergio Lindquist MD   400 mg at 01/07/23 0810    pantoprazole (PROTONIX) tablet 40 mg  40 mg Oral ACB Sergio Lindquist MD   40 mg at 01/07/23 0811    sodium chloride (NS) flush 5-40 mL  5-40 mL IntraVENous Q8H Sergio Lindquist MD   10 mL at 01/07/23 8130 sodium chloride (NS) flush 5-40 mL  5-40 mL IntraVENous PRN Key Engel MD        acetaminophen (TYLENOL) tablet 650 mg  650 mg Oral Q6H PRN Key Engel MD   650 mg at 01/03/23 1647    Or    acetaminophen (TYLENOL) suppository 650 mg  650 mg Rectal Q6H PRN Key Engel MD        ondansetron (ZOFRAN ODT) tablet 4 mg  4 mg Oral Q6H PRN Key Engel MD        Or    ondansetron (ZOFRAN) injection 4 mg  4 mg IntraVENous Q6H PRN Key Engel MD        dexamethasone (DECADRON) 4 mg/mL injection 8 mg  8 mg IntraVENous Q8H Key Engel MD   8 mg at 01/07/23 8466        Allergies   Allergen Reactions    Contrast Agent [Iodine] Rash    Adhesive Tape-Silicones Itching and Contact Dermatitis       Review of Systems:  Constitutional No fevers, chills, night sweats, excessive fatigue or weight loss. Allergic/Immunologic No recent allergic reactions   Eyes No significant visual difficulties. No diplopia. ENMT No problems with hearing, no sore throat, no sinus drainage. Endocrine No hot flashes or night sweats. No cold intolerance, polyuria, or polydipsia   Hematologic/Lymphatic No easy bruising or bleeding. The patient denies any tender or palpable lymph nodes   Breasts No abnormal masses of breast, nipple discharge or pain. Respiratory No dyspnea on exertion, orthopnea, chest pain, cough or hemoptysis. Cardiovascular No anginal chest pain, irregular heart beat, tachycardia, palpitations or orthopnea. Gastrointestinal No nausea, vomiting, diarrhea, constipation, cramping, dysphagia, reflux, heartburn, GI bleeding, or early satiety. No change in bowel habits. Genitourinary (M) No hematuria, dysuria, increased frequency, urgency, hesitancy or incontinence.    Musculoskeletal Severe low back pain radiating down the right leg and weakness of the legs   Integumentary No chronic rashes, inflammation, ulcerations, pruritus, petechiae, purpura, ecchymoses, or skin changes. Neurologic No headache, blurred vision, and full: Weakness of bilateral legs right worse than the left   Psychiatric No insomnia, depression, bjorn or mood swings. No psychotropic drugs. Objective:     Vitals:    01/07/23 0224 01/07/23 0726 01/07/23 0816 01/07/23 1111   BP: 128/70  139/67    Pulse: 68  63    Resp: 18  18    Temp: 97.8 °F (36.6 °C)  97.8 °F (36.6 °C)    SpO2: 98% 97% 96%    Weight:       Height:    5' 5.98\" (1.676 m)        Physical Exam:  Constitutional Elderly white female , Alert, cooperative, oriented. Mood and affect appropriate. Appears close to chronological age. Well nourished. Well developed. Head Normocephalic; no scars   Eyes Conjunctivae and sclerae are clear and without icterus. Pupils are reactive and equal.   ENMT Sinuses are nontender. No oral exudates, ulcers, masses, thrush or mucositis. Oropharynx clear. Tongue normal.   Neck Supple without masses or thyromegaly. No jugular venous distension. Hematologic/Lymphatic No petechiae or purpura. No tender or palpable lymph nodes in the cervical, supraclavicular, axillary or inguinal area. Respiratory Lungs are clear to auscultation without rhonchi or wheezing. Cardiovascular Regular rate and rhythm of heart without murmurs, gallops or rubs. Chest / Line Site Chest is symmetric with no chest wall deformities. Abdomen Non-tender, non-distended, no masses, ascites or hepatosplenomegaly. Good bowel sounds. No guarding or rebound tenderness. No pulsatile masses. Musculoskeletal No tenderness or swelling, normal range of motion without obvious weakness. Extremities No visible deformities, no cyanosis, clubbing or edema. Skin No rashes, scars, or lesions suggestive of malignancy. No petechiae, purpura, or ecchymoses. No excoriations. Neurologic Bilateral lower extremity weakness 2/5 right greater than left   Psychiatric Alert and oriented times three. Coherent speech.  Verbalizes understanding of our discussions today. Lab/Data Review:  Recent Labs     01/06/23  0836   WBC 7.4   HGB 13.0   HCT 40.6          Recent Labs     01/06/23  0836      K 4.4   CL 97   CO2 33*   *   BUN 28*   CREA 0.50*   CA 9.2   MG 2.3   ALB 3.0*   TBILI 0.6   ALT 60       No results for input(s): PH, PCO2, PO2, HCO3, FIO2 in the last 72 hours. No results found for this or any previous visit (from the past 24 hour(s)). CT CHEST ABD PELV WO CONT    Result Date: 1/6/2023  No evidence for recurrent or metastatic disease in the chest, abdomen, or pelvis. Stable appearance of the bones. Please refer to above findings for complete details. Assessment and Plan:     Hospital Problems  Date Reviewed: 12/30/2022            Codes Class Noted POA    Acute back pain ICD-10-CM: M54.9  ICD-9-CM: 724.5  12/30/2022 Yes        Lower extremity weakness ICD-10-CM: R29.898  ICD-9-CM: 729.89  12/30/2022 Yes        Gait disturbance ICD-10-CM: R26.9  ICD-9-CM: 781.2  12/30/2022 Yes       1. Metastatic non-small cell lung cancer,adenocarcinoma for which she is being treated since 2013. Over the course of these past 10 years she has received multiple treatments including surgical resection, CarboTaxol radiation, carboplatin Alimta, Taxotere & Opdivo. She had complete remission in 2017 at which time all treatments were stopped. -She did well until 10/2022 when she developed back pain with subsequent work up showing L3 intradural mass. _Obtain ct chest abdomen and pelvis to stage her disease now. She is allergic to iv dye and so non contrast imaging is being ordered. She however could not lay flat and so these are delayed. 2. L3 intradural mass: occupying the thecal sac. -Pain and weakness of Legs , rt >left.   -Started urgent radiation 1/3/22. Tolerating well and having signficant pain relief. Strength and weakness is not much improved.    -Overall her condition and neurological exam remained stable.  -continue steroids iv .   -continue Ppi.  -continue pain management.        Signed By: Arcelia Lee MD     January 7, 2023

## 2023-01-08 PROCEDURE — 94761 N-INVAS EAR/PLS OXIMETRY MLT: CPT

## 2023-01-08 PROCEDURE — 74011250636 HC RX REV CODE- 250/636: Performed by: HOSPITALIST

## 2023-01-08 PROCEDURE — 74011250637 HC RX REV CODE- 250/637: Performed by: STUDENT IN AN ORGANIZED HEALTH CARE EDUCATION/TRAINING PROGRAM

## 2023-01-08 PROCEDURE — 74011250637 HC RX REV CODE- 250/637: Performed by: INTERNAL MEDICINE

## 2023-01-08 PROCEDURE — 74011250637 HC RX REV CODE- 250/637: Performed by: HOSPITALIST

## 2023-01-08 PROCEDURE — 77010033678 HC OXYGEN DAILY

## 2023-01-08 PROCEDURE — 97530 THERAPEUTIC ACTIVITIES: CPT

## 2023-01-08 PROCEDURE — 65270000029 HC RM PRIVATE

## 2023-01-08 PROCEDURE — 97110 THERAPEUTIC EXERCISES: CPT

## 2023-01-08 PROCEDURE — 74011000250 HC RX REV CODE- 250: Performed by: HOSPITALIST

## 2023-01-08 PROCEDURE — 94640 AIRWAY INHALATION TREATMENT: CPT

## 2023-01-08 RX ADMIN — TIOTROPIUM BROMIDE INHALATION SPRAY 2 PUFF: 3.12 SPRAY, METERED RESPIRATORY (INHALATION) at 09:15

## 2023-01-08 RX ADMIN — FOLIC ACID 1 MG: 1 TABLET ORAL at 08:04

## 2023-01-08 RX ADMIN — BUDESONIDE AND FORMOTEROL FUMARATE DIHYDRATE 1 PUFF: 160; 4.5 AEROSOL RESPIRATORY (INHALATION) at 08:44

## 2023-01-08 RX ADMIN — OXYCODONE HYDROCHLORIDE 10 MG: 5 TABLET ORAL at 14:10

## 2023-01-08 RX ADMIN — GABAPENTIN 400 MG: 400 CAPSULE ORAL at 21:37

## 2023-01-08 RX ADMIN — DEXAMETHASONE SODIUM PHOSPHATE 8 MG: 4 INJECTION, SOLUTION INTRA-ARTICULAR; INTRALESIONAL; INTRAMUSCULAR; INTRAVENOUS; SOFT TISSUE at 21:37

## 2023-01-08 RX ADMIN — BUDESONIDE AND FORMOTEROL FUMARATE DIHYDRATE 1 PUFF: 160; 4.5 AEROSOL RESPIRATORY (INHALATION) at 21:37

## 2023-01-08 RX ADMIN — GABAPENTIN 400 MG: 400 CAPSULE ORAL at 08:05

## 2023-01-08 RX ADMIN — DOCUSATE SODIUM 100 MG: 100 CAPSULE, LIQUID FILLED ORAL at 08:05

## 2023-01-08 RX ADMIN — PANTOPRAZOLE SODIUM 40 MG: 40 TABLET, DELAYED RELEASE ORAL at 08:05

## 2023-01-08 RX ADMIN — CYCLOBENZAPRINE 10 MG: 10 TABLET, FILM COATED ORAL at 09:55

## 2023-01-08 RX ADMIN — GABAPENTIN 400 MG: 400 CAPSULE ORAL at 16:22

## 2023-01-08 RX ADMIN — SODIUM CHLORIDE, PRESERVATIVE FREE 10 ML: 5 INJECTION INTRAVENOUS at 06:26

## 2023-01-08 RX ADMIN — OXYCODONE HYDROCHLORIDE 10 MG: 5 TABLET ORAL at 03:25

## 2023-01-08 RX ADMIN — SODIUM CHLORIDE, PRESERVATIVE FREE 10 ML: 5 INJECTION INTRAVENOUS at 21:38

## 2023-01-08 RX ADMIN — DEXAMETHASONE SODIUM PHOSPHATE 8 MG: 4 INJECTION, SOLUTION INTRA-ARTICULAR; INTRALESIONAL; INTRAMUSCULAR; INTRAVENOUS; SOFT TISSUE at 06:25

## 2023-01-08 RX ADMIN — SODIUM CHLORIDE, PRESERVATIVE FREE 10 ML: 5 INJECTION INTRAVENOUS at 15:14

## 2023-01-08 RX ADMIN — DEXAMETHASONE SODIUM PHOSPHATE 8 MG: 4 INJECTION, SOLUTION INTRA-ARTICULAR; INTRALESIONAL; INTRAMUSCULAR; INTRAVENOUS; SOFT TISSUE at 13:59

## 2023-01-08 NOTE — PROGRESS NOTES
Hospitalist Progress Note         Bing Carrero MD          Daily Progress Note: 1/8/2023      Subjective: The patient is seen for follow  up. Gely Otero 78 y.o. female history of carcinoma of the lung with lobectomy in 1996, since then on follow-up with surveillance. She woke up this morning with severe lower extremity weakness and pain unable to stand up and walk. 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 She is recommended to follow-up with oncology, Dr. Wu Patient, had MRI and PET scan scheduled January 2023. States the back pain started getting worse, its severe, given oxycodone 10 mg 3 times a day but not helping the pain. Its radiating into the legs, now with severe weakness. She is crying with pain worse with any movement. Due to the mass in the lumbar spine requested for CT of thoracic spine and lumbar spine, consult placed to radiation oncology. As she is not completely paralyzed, radiation oncology recommended to continue steroids at this time and follow-up. Dr. Mercedes Reyes, rad/onc, to start radiation treatment on 01/03. Oncology consult. Continue pain management inpatient for now. Family interested in pursing hospice at this time. Hospice consult placed.      Problem List:  Problem List as of 1/8/2023 Date Reviewed: 12/30/2022            Codes Class Noted - Resolved    Acute back pain ICD-10-CM: M54.9  ICD-9-CM: 724.5  12/30/2022 - Present        Lower extremity weakness ICD-10-CM: R29.898  ICD-9-CM: 729.89  12/30/2022 - Present        Gait disturbance ICD-10-CM: R26.9  ICD-9-CM: 781.2  12/30/2022 - Present        Bronchitis ICD-10-CM: J40  ICD-9-CM: 773  11/16/2021 - Present        [facility] ICD-10-CM: A41.9  ICD-9-CM: 038.9, 995.91  11/13/2021 - Present        Acute and chronic respiratory failure with hypoxia ([facility] ICD-10-CM: J96.21  ICD-9-CM: 518.84, 799.02  11/13/2021 - Present        Lung cancer [facility]) ICD-10-CM: C34.90  ICD-9-CM: 162.9  4/29/2014 - Present        Dyslipidemia ICD-10-CM: E78.5  ICD-9-CM: 272.4  4/29/2014 - Present         Medications reviewed  Current Facility-Administered Medications   Medication Dose Route Frequency    fentaNYL (DURAGESIC) 25 mcg/hr patch 2 Patch  2 Patch TransDERmal Q72H    oxyCODONE IR (ROXICODONE) tablet 10 mg  10 mg Oral Q8H PRN    ALPRAZolam (XANAX) tablet 0.25 mg  0.25 mg Oral TID PRN    docusate sodium (COLACE) capsule 100 mg  100 mg Oral DAILY    cyclobenzaprine (FLEXERIL) tablet 10 mg  10 mg Oral TID PRN    polyethylene glycol (MIRALAX) packet 17 g  17 g Oral QHS    hydrOXYzine pamoate (VISTARIL) capsule 25 mg  25 mg Oral TID PRN    diphenhydrAMINE (BENADRYL) capsule 25 mg  25 mg Oral Q6H PRN    albuterol-ipratropium (DUO-NEB) 2.5 MG-0.5 MG/3 ML  3 mL Nebulization Q4H PRN    budesonide-formoteroL (SYMBICORT) 160-4.5 mcg/actuation HFA inhaler 1 Puff  1 Puff Inhalation BID RT    And    tiotropium bromide (SPIRIVA RESPIMAT) 2.5 mcg /actuation  2 Puff Inhalation DAILY    albuterol (PROVENTIL HFA, VENTOLIN HFA, PROAIR HFA) inhaler 2 Puff  2 Puff Inhalation C2O PRN    folic acid (FOLVITE) tablet 1 mg  1 mg Oral ACB    gabapentin (NEURONTIN) capsule 400 mg  400 mg Oral TID    pantoprazole (PROTONIX) tablet 40 mg  40 mg Oral ACB    sodium chloride (NS) flush 5-40 mL  5-40 mL IntraVENous Q8H    sodium chloride (NS) flush 5-40 mL  5-40 mL IntraVENous PRN    acetaminophen (TYLENOL) tablet 650 mg  650 mg Oral Q6H PRN    Or    acetaminophen (TYLENOL) suppository 650 mg  650 mg Rectal Q6H PRN    ondansetron (ZOFRAN ODT) tablet 4 mg  4 mg Oral Q6H PRN    Or    ondansetron (ZOFRAN) injection 4 mg  4 mg IntraVENous Q6H PRN    dexamethasone (DECADRON) 4 mg/mL injection 8 mg  8 mg IntraVENous Q8H       Review of Systems:   A comprehensive review of systems was negative except for that written in the HPI.    Objective:   Physical Exam:     Visit Vitals  /68 (BP 1 Location: Left upper arm, BP Patient Position: At rest;Semi fowlers)   Pulse 73   Temp 97.9 °F (36.6 °C)   Resp 18   Ht 5' 5.98\" (1.676 m)   Wt 81.6 kg (180 lb)   SpO2 94%   BMI 29.07 kg/m²    O2 Flow Rate (L/min): 2 l/min O2 Device: Nasal cannula    Temp (24hrs), Av.1 °F (36.7 °C), Min:97.9 °F (36.6 °C), Max:98.3 °F (36.8 °C)    No intake/output data recorded.  1901 -  0700  In: -   Out: 750 [Urine:750]    General:  Alert, cooperative, no distress, appears stated age. Lungs:   Clear to auscultation bilaterally. Chest wall:  No tenderness or deformity. Heart:  Regular rate and rhythm, S1, S2 normal, no murmur, click, rub or gallop. Abdomen:   Soft, non-tender. Bowel sounds normal. No masses,  No organomegaly. Extremities: Extremities normal, atraumatic, no cyanosis or edema. Pulses: 2+ and symmetric all extremities. Skin: Skin color, texture, turgor normal. No rashes or lesions   Neurologic: CNII-XII intact. No gross sensory or motor deficits     Data Review:       Recent Days:  Recent Labs     23  0836   WBC 7.4   HGB 13.0   HCT 40.6          Recent Labs     23  0836      K 4.4   CL 97   CO2 33*   *   BUN 28*   CREA 0.50*   CA 9.2   MG 2.3   ALB 3.0*   TBILI 0.6   ALT 60       No results for input(s): PH, PCO2, PO2, HCO3, FIO2 in the last 72 hours. 24 Hour Results:  No results found for this or any previous visit (from the past 24 hour(s)). Assessment/     1. Gait disturbance with lower extremity weakness  - D/t cord compression and tumor  - Continue IV dexamethasone as per radiation oncology recommendations, 10 mg of 1 dose now then 8 mg every 8 hours and monitor closely. 2. Severe back pain secondary to suspected spinal mass  - Increased the dose of the oxycodone to long-acting OxyContin 20 mg twice a day with IV morphine breakthrough pain relief.   - Mass at L3 that was clearly present 2 weeks ago on MRI and appears to have progressed causing increased pain and possibly lower extremity weakness.  - Discussed with Dr. Papo Cordova; per Spine surgeons --> very high complication risk.   - Radiation oncology consulted --> XRT started; 10 total (30 Gy in 10 fractions per Rad Onc starting on 01/03/2023)  - Add fentanyl --> increase to 50mcg; change other meds to PRN  - Add xanax prn    3. 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 COPD  - Mild with no signs of exacerbation on DuoNeb which we will continue      Plan:  Continue supportive care pending hospice evaluation  Will benefit from home hospice care once completed    Care Plan discussed with: Patient/Family    Total time spent with patient: 30 minutes.     Maggy Bird MD

## 2023-01-08 NOTE — PROGRESS NOTES
Problem: Falls - Risk of  Goal: *Absence of Falls  Description: Document [place] Fall Risk and appropriate interventions in the flowsheet. Outcome: Progressing Towards Goal  Note: Fall Risk Interventions:  Mobility Interventions: PT Consult for mobility concerns         Medication Interventions: Bed/chair exit alarm    Elimination Interventions: Bed/chair exit alarm, Call light in reach              Problem: Patient Education: Go to Patient Education Activity  Goal: Patient/Family Education  Outcome: Progressing Towards Goal     Problem: Pain  Goal: *Control of Pain  Outcome: Progressing Towards Goal     Problem: Patient Education: Go to Patient Education Activity  Goal: Patient/Family Education  Outcome: Progressing Towards Goal     Problem: General Medical Care Plan  Goal: *Vital signs within specified parameters  Outcome: Progressing Towards Goal  Goal: *Labs within defined limits  Outcome: Progressing Towards Goal  Goal: *Absence of infection signs and symptoms  Outcome: Progressing Towards Goal  Goal: *Optimal pain control at patient's stated goal  Outcome: Progressing Towards Goal  Goal: *Skin integrity maintained  Outcome: Progressing Towards Goal  Goal: *Fluid volume balance  Outcome: Progressing Towards Goal  Goal: *Optimize nutritional status  Outcome: Progressing Towards Goal  Goal: *Anxiety reduced or absent  Outcome: Progressing Towards Goal  Goal: *Progressive mobility and function (eg: ADL's)  Outcome: Progressing Towards Goal     Problem: Patient Education: Go to Patient Education Activity  Goal: Patient/Family Education  Outcome: Progressing Towards Goal     Problem: Patient Education: Go to Patient Education Activity  Goal: Patient/Family Education  Outcome: Progressing Towards Goal     Problem: Pressure Injury - Risk of  Goal: *Prevention of pressure injury  Description: Document Al Scale and appropriate interventions in the flowsheet.   Outcome: Progressing Towards Goal     Problem: Patient Education: Go to Patient Education Activity  Goal: Patient/Family Education  Outcome: Progressing Towards Goal     Problem: Patient Education: Go to Patient Education Activity  Goal: Patient/Family Education  Outcome: Progressing Towards Goal     Problem: Patient Education: Go to Patient Education Activity  Goal: Patient/Family Education  Outcome: Progressing Towards Goal

## 2023-01-08 NOTE — PROGRESS NOTES
Problem: Mobility Impaired (Adult and Pediatric)  Goal: *Acute Goals and Plan of Care (Insert Text)  Description: FUNCTIONAL STATUS PRIOR TO ADMISSION: Patient was modified independent using a rolling walker for functional mobility. Patient required minimal assistance for basic and instrumental ADLs. HOME SUPPORT PRIOR TO ADMISSION: The patient lived alone with daughter to provide assistance. Physical Therapy Goals  Initiated 1/4/2023  Pt stated goal: to go home  Pt will be I with LE HEP in 7 days. Pt will perform bed mobility with mod I utilizing log roll technique in 7 days. Pt will perform transfers with mod I in 7 days. Pt will amb 25-50 feet with LRAD safely with mod I in 7 days. Pt will verbalize and demonstrate compliance with spinal precautions to include no bending, lifting or twisting in 7 days. Outcome: Progressing Towards Goal   PHYSICAL THERAPY TREATMENT  Patient: Aris Brewer (39 y.o. female)  Date: 1/8/2023  Diagnosis: Acute back pain [M54.9] <principal problem not specified>      Precautions:    Chart, physical therapy assessment, plan of care and goals were reviewed. ASSESSMENT  Patient continues with skilled PT services and is progressing towards goals. Pt received semi supine in bed upon PTA arrival, agreeable to session. Pt completed LE ex in supine, needing rest break after 5 reps. With HOB elevated to highest, pt was able to complete supine to sitting and scoot to EOB. Attempted sit<>stand x 4 trials, but pt unable to clear buttocks off bed, although rising slightly higher each time. Noted pt and bed soiled with urine. RN assisted with toileting, bed linen and merlin pad change while pt needed mod to mod A for rolling side to side. Pt required max A x 2 for scooting to [facility] (See below for objective details and assist levels).  Overall pt tolerated session fairly well today with good progress shown with bed mobility and with excellent participation and willingness to complete all tasks. Will continue to benefit from skilled PT services, and will continue to progress as tolerated. Current Level of Function Impacting Discharge (mobility/balance): assist x 2 for OOB    Other factors to consider for discharge: severity of deficits, safety, home environment and support available         PLAN :  Patient continues to benefit from skilled intervention to address the above impairments. Continue treatment per established plan of care to address goals. Recommend with staff: Encourage HEP in prep for ADLs/mobility, Frequent repositioning to prevent skin breakdown, Use of bed/chair alarm for safety, and LE elevation for management of edema    Recommendation for discharge: (in order for the patient to meet his/her long term goals)  Tae Anand    This discharge recommendation:  Has been made in collaboration with the attending provider and/or case management    IF patient discharges home will need the following DME: to be determined (TBD)       SUBJECTIVE:   Patient stated I want to try to get up, I know I need to do more than what I'm doing right now.     OBJECTIVE DATA SUMMARY:   Critical Behavior:  Neurologic State: Alert  Orientation Level: Oriented X4  Cognition: Follows commands     Functional Mobility Training:  Bed Mobility:  Rolling: Moderate assistance  Supine to Sit: Moderate assistance  Sit to Supine: Maximum assistance  Scooting: Moderate assistance     Transfers:  Sit to Stand:  Other (comment) (attempted x 4 but unable to clear buttocks off bed)     Balance:  Sitting: Impaired  Sitting - Static: Good (unsupported)  Sitting - Dynamic: Fair (occasional)    Therapeutic Exercises:       EXERCISE   Sets   Reps   Active Active Assist   Passive Self ROM   Comments   Ankle Pumps 2 10 [x] [] [] []    Quad Sets/Glut Sets   [] [] [] []    Hamstring Sets   [] [] [] []    Short Arc Quads   [] [] [] []    Heel Slides 2 5 [x] [] [] []    Straight Leg Raises 2 5 [] [x] [] []    Hip abd/add 2 5 [x] [] [] []    Long Arc Quads   [] [] [] []    Marching   [] [] [] []       [] [] [] []       Pain Ratin-9/10    Activity Tolerance:   Fair    After treatment patient left in no apparent distress:   Bed locked and returned to lowest position, Supine in bed, Heels elevated for pressure relief, Call bell within reach, Bed / chair alarm activated, Caregiver / family present, and Side rails x 3    COMMUNICATION/COLLABORATION:   The patients plan of care was discussed with: Registered nurse and Respiratory therapist.     Demarcus Pryor PTA, PT   Time Calculation: 53 mins

## 2023-01-08 NOTE — PROGRESS NOTES
Problem: Falls - Risk of  Goal: *Absence of Falls  Description: Document Kellyleobardo Allison Fall Risk and appropriate interventions in the flowsheet.   Outcome: Progressing Towards Goal  Note: Fall Risk Interventions:  Mobility Interventions: PT Consult for mobility concerns         Medication Interventions: Bed/chair exit alarm    Elimination Interventions: Bed/chair exit alarm, Call light in reach              Problem: Patient Education: Go to Patient Education Activity  Goal: Patient/Family Education  Outcome: Progressing Towards Goal     Problem: General Medical Care Plan  Goal: *Vital signs within specified parameters  Outcome: Progressing Towards Goal

## 2023-01-08 NOTE — PROGRESS NOTES
Hematology/Oncology progress notes. JACOB Cordova is a 78 y.o. female who is being seen for  L3 intradural mass. She is a patient of Dr. Charanjit Caraballo and has metastatic non-small cell lung cancer for which she is being followed since 2013. Over the course of these past 10 years she has received multiple treatments including surgical resection, CarboTaxol radiation, carboplatin Alimta, Taxotere & Opdivo. She had complete remission in 2017 at which time all treatments were stopped. She had a most recent PET scan in September which did not show any clear-cut metastatic disease or recurrence. She developed some low back pain in October and subsequently was seen by orthopedics and obtained an MRI of the lumbar spine mid December. On this MRI she is noted to have an intradural mass posterior to L3 feeling the thecal sac there was a concern for intradural metastasis. The spinal cord terminates normally posterior to T12-L1 ; the soft tissue mass filling the thecal sac posterior to L3 measures 3 cm. She was ordered complete staging work-up again. However she developed progressively worsening low back pain radiating down her right leg along with the development of weakness in her legs. This prompted her to come to the hospital on December 30. Radiation oncology was consulted and was started on IV steroids. She received her first dose of radiation this morning. Medical oncology is consulted for further recommendations today. Interval history: She is receiving radiation treatments to the lumbar spine . reports her leg pains and back pain has significantly improved. Pain now comes on only with movement. The right lower extremity weakness has not shown much improvement remains at the strength of 1-2 out of 5. They chose Rehab , but transportation to radiation is not available as per Family . So she may have to stay in the hospital for this.   As per discussion it appears like she has to stay in the hospital until she completes her radiation treatments. She also requested a hospice evaluation and is considering this after rehabilitation if she does not improve.     Current Facility-Administered Medications   Medication Dose Route Frequency Provider Last Rate Last Admin    fentaNYL ([address]) 25 mcg/hr patch 2 Patch  2 Patch TransDERmal Q72H Rob Kramer MD   2 Patch at 01/07/23 1116    oxyCODONE IR (ROXICODONE) tablet 10 mg  10 mg Oral Q8H PRN Rob Kramer MD   10 mg at 01/08/23 1410    ALPRAZolam (XANAX) tablet 0.25 mg  0.25 mg Oral TID PRN Rob Kraemr MD        docusate sodium (COLACE) capsule 100 mg  100 mg Oral DAILY Irma Bhagat PA-C   100 mg at 01/08/23 0805    cyclobenzaprine (FLEXERIL) tablet 10 mg  10 mg Oral TID PRN Irma Bhagat PA-C   10 mg at 01/08/23 0955    polyethylene glycol (MIRALAX) packet 17 g  17 g Oral QHS Irma Bhagat PA-C   17 g at 01/07/23 2149    hydrOXYzine pamoate (VISTARIL) capsule 25 mg  25 mg Oral TID PRN Irma Bhagat PA-C        diphenhydrAMINE (BENADRYL) capsule 25 mg  25 mg Oral Q6H PRN Irma Bhagat PA-C   25 mg at 01/02/23 0158    albuterol-ipratropium (DUO-NEB) 2.5 MG-0.5 MG/3 ML  3 mL Nebulization Q4H PRN Key Engel MD        budesonide-formoteroL (SYMBICORT) 160-4.5 mcg/actuation HFA inhaler 1 Puff  1 Puff Inhalation BID RT Rodrigo King MD   1 Puff at 01/08/23 0844    And    tiotropium bromide (SPIRIVA RESPIMAT) 2.5 mcg /actuation  2 Puff Inhalation DAILY Rodrigo King MD   2 Puff at 01/08/23 0915    albuterol (PROVENTIL HFA, VENTOLIN HFA, PROAIR HFA) inhaler 2 Puff  2 Puff Inhalation Q6H PRN Key Engel MD        folic acid (FOLVITE) tablet 1 mg  1 mg Oral ACZINA Engel MD   1 mg at 01/08/23 0804    gabapentin (NEURONTIN) capsule 400 mg  400 mg Oral TID Key Engel MD   400 mg at 01/08/23 0805    pantoprazole (PROTONIX) tablet 40 mg  40 mg Oral JAH Casarez Tyrone Mcallister MD   40 mg at 01/08/23 0805    sodium chloride (NS) flush 5-40 mL  5-40 mL IntraVENous Q8H Benigno Frankel, MD   10 mL at 01/08/23 0626    sodium chloride (NS) flush 5-40 mL  5-40 mL IntraVENous PRN Benigno Frankel, MD        acetaminophen (TYLENOL) tablet 650 mg  650 mg Oral Q6H PRN Benigno Frankel, MD   650 mg at 01/03/23 1647    Or    acetaminophen (TYLENOL) suppository 650 mg  650 mg Rectal Q6H PRN Benigno Frankel, MD        ondansetron (ZOFRAN ODT) tablet 4 mg  4 mg Oral Q6H PRN Benigno Frankel, MD        Or    ondansetron (ZOFRAN) injection 4 mg  4 mg IntraVENous Q6H PRN Benigno Frankel, MD        dexamethasone (DECADRON) 4 mg/mL injection 8 mg  8 mg IntraVENous Q8H Benigno Frankel, MD   8 mg at 01/08/23 1359        Allergies   Allergen Reactions    Contrast Agent [Iodine] Rash    Adhesive Tape-Silicones Itching and Contact Dermatitis       Review of Systems:  Constitutional No fevers, chills, night sweats, excessive fatigue or weight loss. Allergic/Immunologic No recent allergic reactions   Eyes No significant visual difficulties. No diplopia. ENMT No problems with hearing, no sore throat, no sinus drainage. Endocrine No hot flashes or night sweats. No cold intolerance, polyuria, or polydipsia   Hematologic/Lymphatic No easy bruising or bleeding. The patient denies any tender or palpable lymph nodes   Breasts No abnormal masses of breast, nipple discharge or pain. Respiratory No dyspnea on exertion, orthopnea, chest pain, cough or hemoptysis. Cardiovascular No anginal chest pain, irregular heart beat, tachycardia, palpitations or orthopnea. Gastrointestinal No nausea, vomiting, diarrhea, constipation, cramping, dysphagia, reflux, heartburn, GI bleeding, or early satiety. No change in bowel habits. Genitourinary (M) No hematuria, dysuria, increased frequency, urgency, hesitancy or incontinence.    Musculoskeletal Severe low back pain radiating down the right leg and weakness of the legs   Integumentary No chronic rashes, inflammation, ulcerations, pruritus, petechiae, purpura, ecchymoses, or skin changes. Neurologic No headache, blurred vision, and full: Weakness of bilateral legs right worse than the left   Psychiatric No insomnia, depression, bjorn or mood swings. No psychotropic drugs. Objective:     Vitals:    01/07/23 1934 01/08/23 0328 01/08/23 0701 01/08/23 0846   BP: 120/65 120/68 131/68    Pulse: 72 73 73    Resp: 18 18 18    Temp: 98.2 °F (36.8 °C) 97.9 °F (36.6 °C) 97.9 °F (36.6 °C)    SpO2: 96% 96% 96% 94%   Weight:       Height:            Physical Exam:  Constitutional Elderly white female , Alert, cooperative, oriented. Mood and affect appropriate. Appears close to chronological age. Well nourished. Well developed. Head Normocephalic; no scars   Eyes Conjunctivae and sclerae are clear and without icterus. Pupils are reactive and equal.   ENMT Sinuses are nontender. No oral exudates, ulcers, masses, thrush or mucositis. Oropharynx clear. Tongue normal.   Neck Supple without masses or thyromegaly. No jugular venous distension. Hematologic/Lymphatic No petechiae or purpura. No tender or palpable lymph nodes in the cervical, supraclavicular, axillary or inguinal area. Respiratory Lungs are clear to auscultation without rhonchi or wheezing. Cardiovascular Regular rate and rhythm of heart without murmurs, gallops or rubs. Chest / Line Site Chest is symmetric with no chest wall deformities. Abdomen Non-tender, non-distended, no masses, ascites or hepatosplenomegaly. Good bowel sounds. No guarding or rebound tenderness. No pulsatile masses. Musculoskeletal No tenderness or swelling, normal range of motion without obvious weakness. Extremities No visible deformities, no cyanosis, clubbing or edema. Skin No rashes, scars, or lesions suggestive of malignancy. No petechiae, purpura, or ecchymoses. No excoriations. Neurologic Bilateral lower extremity weakness 2/5 right greater than left   Psychiatric Alert and oriented times three. Coherent speech. Verbalizes understanding of our discussions today. Lab/Data Review:  Recent Labs     01/06/23  0836   WBC 7.4   HGB 13.0   HCT 40.6          Recent Labs     01/06/23  0836      K 4.4   CL 97   CO2 33*   *   BUN 28*   CREA 0.50*   CA 9.2   MG 2.3   ALB 3.0*   TBILI 0.6   ALT 60       No results for input(s): PH, PCO2, PO2, HCO3, FIO2 in the last 72 hours. No results found for this or any previous visit (from the past 24 hour(s)). CT CHEST ABD PELV WO CONT    Result Date: 1/6/2023  No evidence for recurrent or metastatic disease in the chest, abdomen, or pelvis. Stable appearance of the bones. Please refer to above findings for complete details. Assessment and Plan:     Hospital Problems  Date Reviewed: 12/30/2022            Codes Class Noted POA    Acute back pain ICD-10-CM: M54.9  ICD-9-CM: 724.5  12/30/2022 Yes        Lower extremity weakness ICD-10-CM: R29.898  ICD-9-CM: 729.89  12/30/2022 Yes        Gait disturbance ICD-10-CM: R26.9  ICD-9-CM: 781.2  12/30/2022 Yes       1. Metastatic non-small cell lung cancer,adenocarcinoma for which she is being treated since 2013. Over the course of these past 10 years she has received multiple treatments including surgical resection, CarboTaxol radiation, carboplatin Alimta, Taxotere & Opdivo. She had complete remission in 2017 at which time all treatments were stopped. -She did well until 10/2022 when she developed back pain with subsequent work up showing L3 intradural mass. _Obtain ct chest abdomen and pelvis to stage her disease now. She is allergic to iv dye and so non contrast imaging is being ordered. She however could not lay flat and so these are delayed. -She is considering hospice care after all the acute treatments are complete. 2. L3 intradural mass: occupying the thecal sac. -Pain and weakness of Legs , rt >left.   -Started urgent radiation 1/3/22. Tolerating well and having signficant pain relief. Strength and weakness is not much improved. -Overall her condition and neurological exam remained stable.  -continue steroids iv .   -continue Ppi.  -continue pain management. Will sign off please call for any questions . She can follow-up with Dr. Fransisco Don after discharge from the hospital to have further discussion about treatment options and work-up for her renal lung cancer if she desires so.       Signed By: Deepali Ford MD     January 8, 2023

## 2023-01-09 ENCOUNTER — HOSPITAL ENCOUNTER (INPATIENT)
Dept: RADIATION THERAPY | Age: 83
Discharge: HOME | DRG: 055 | End: 2023-01-09
Payer: MEDICARE

## 2023-01-09 PROCEDURE — 74011250637 HC RX REV CODE- 250/637: Performed by: STUDENT IN AN ORGANIZED HEALTH CARE EDUCATION/TRAINING PROGRAM

## 2023-01-09 PROCEDURE — 74011250637 HC RX REV CODE- 250/637: Performed by: HOSPITALIST

## 2023-01-09 PROCEDURE — 97530 THERAPEUTIC ACTIVITIES: CPT

## 2023-01-09 PROCEDURE — 77336 RADIATION PHYSICS CONSULT: CPT

## 2023-01-09 PROCEDURE — 74011250637 HC RX REV CODE- 250/637: Performed by: INTERNAL MEDICINE

## 2023-01-09 PROCEDURE — 74011250636 HC RX REV CODE- 250/636: Performed by: HOSPITALIST

## 2023-01-09 PROCEDURE — 77412 RADIATION TX DELIVERY LVL 3: CPT

## 2023-01-09 PROCEDURE — 94640 AIRWAY INHALATION TREATMENT: CPT

## 2023-01-09 PROCEDURE — 65270000029 HC RM PRIVATE

## 2023-01-09 PROCEDURE — 74011000250 HC RX REV CODE- 250: Performed by: HOSPITALIST

## 2023-01-09 PROCEDURE — 77010033678 HC OXYGEN DAILY

## 2023-01-09 RX ORDER — OXYCODONE HYDROCHLORIDE 5 MG/1
10 TABLET ORAL
Status: DISCONTINUED | OUTPATIENT
Start: 2023-01-09 | End: 2023-01-18 | Stop reason: HOSPADM

## 2023-01-09 RX ADMIN — SODIUM CHLORIDE, PRESERVATIVE FREE 10 ML: 5 INJECTION INTRAVENOUS at 15:56

## 2023-01-09 RX ADMIN — FOLIC ACID 1 MG: 1 TABLET ORAL at 06:27

## 2023-01-09 RX ADMIN — CYCLOBENZAPRINE 10 MG: 10 TABLET, FILM COATED ORAL at 12:07

## 2023-01-09 RX ADMIN — OXYCODONE HYDROCHLORIDE 10 MG: 5 TABLET ORAL at 15:55

## 2023-01-09 RX ADMIN — SODIUM CHLORIDE, PRESERVATIVE FREE 10 ML: 5 INJECTION INTRAVENOUS at 06:27

## 2023-01-09 RX ADMIN — DOCUSATE SODIUM 100 MG: 100 CAPSULE, LIQUID FILLED ORAL at 09:08

## 2023-01-09 RX ADMIN — SODIUM CHLORIDE, PRESERVATIVE FREE 10 ML: 5 INJECTION INTRAVENOUS at 23:03

## 2023-01-09 RX ADMIN — GABAPENTIN 400 MG: 400 CAPSULE ORAL at 15:55

## 2023-01-09 RX ADMIN — DEXAMETHASONE SODIUM PHOSPHATE 8 MG: 4 INJECTION, SOLUTION INTRA-ARTICULAR; INTRALESIONAL; INTRAMUSCULAR; INTRAVENOUS; SOFT TISSUE at 06:25

## 2023-01-09 RX ADMIN — BUDESONIDE AND FORMOTEROL FUMARATE DIHYDRATE 1 PUFF: 160; 4.5 AEROSOL RESPIRATORY (INHALATION) at 21:04

## 2023-01-09 RX ADMIN — DEXAMETHASONE SODIUM PHOSPHATE 8 MG: 4 INJECTION, SOLUTION INTRA-ARTICULAR; INTRALESIONAL; INTRAMUSCULAR; INTRAVENOUS; SOFT TISSUE at 15:55

## 2023-01-09 RX ADMIN — TIOTROPIUM BROMIDE INHALATION SPRAY 2 PUFF: 3.12 SPRAY, METERED RESPIRATORY (INHALATION) at 07:51

## 2023-01-09 RX ADMIN — DEXAMETHASONE SODIUM PHOSPHATE 8 MG: 4 INJECTION, SOLUTION INTRA-ARTICULAR; INTRALESIONAL; INTRAMUSCULAR; INTRAVENOUS; SOFT TISSUE at 23:02

## 2023-01-09 RX ADMIN — GABAPENTIN 400 MG: 400 CAPSULE ORAL at 09:08

## 2023-01-09 RX ADMIN — OXYCODONE HYDROCHLORIDE 10 MG: 5 TABLET ORAL at 09:07

## 2023-01-09 RX ADMIN — PANTOPRAZOLE SODIUM 40 MG: 40 TABLET, DELAYED RELEASE ORAL at 06:27

## 2023-01-09 RX ADMIN — BUDESONIDE AND FORMOTEROL FUMARATE DIHYDRATE 1 PUFF: 160; 4.5 AEROSOL RESPIRATORY (INHALATION) at 07:51

## 2023-01-09 RX ADMIN — GABAPENTIN 400 MG: 400 CAPSULE ORAL at 23:02

## 2023-01-09 NOTE — PROGRESS NOTES
Problem: Falls - Risk of  Goal: *Absence of Falls  Description: Document Conception Brawn Fall Risk and appropriate interventions in the flowsheet. Outcome: Progressing Towards Goal  Note: Fall Risk Interventions:  Mobility Interventions: PT Consult for mobility concerns         Medication Interventions: Bed/chair exit alarm    Elimination Interventions: Bed/chair exit alarm, Call light in reach              Problem: Patient Education: Go to Patient Education Activity  Goal: Patient/Family Education  Outcome: Progressing Towards Goal     Problem: Pain  Goal: *Control of Pain  Outcome: Progressing Towards Goal     Problem: Patient Education: Go to Patient Education Activity  Goal: Patient/Family Education  Outcome: Progressing Towards Goal     Problem: General Medical Care Plan  Goal: *Vital signs within specified parameters  Outcome: Progressing Towards Goal  Goal: *Labs within defined limits  Outcome: Progressing Towards Goal  Goal: *Absence of infection signs and symptoms  Outcome: Progressing Towards Goal  Goal: *Optimal pain control at patient's stated goal  Outcome: Progressing Towards Goal  Goal: *Skin integrity maintained  Outcome: Progressing Towards Goal  Goal: *Fluid volume balance  Outcome: Progressing Towards Goal  Goal: *Optimize nutritional status  Outcome: Progressing Towards Goal  Goal: *Anxiety reduced or absent  Outcome: Progressing Towards Goal  Goal: *Progressive mobility and function (eg: ADL's)  Outcome: Progressing Towards Goal     Problem: Patient Education: Go to Patient Education Activity  Goal: Patient/Family Education  Outcome: Progressing Towards Goal     Problem: Patient Education: Go to Patient Education Activity  Goal: Patient/Family Education  Outcome: Progressing Towards Goal     Problem: Pressure Injury - Risk of  Goal: *Prevention of pressure injury  Description: Document Al Scale and appropriate interventions in the flowsheet.   Outcome: Progressing Towards Goal     Problem: Patient Education: Go to Patient Education Activity  Goal: Patient/Family Education  Outcome: Progressing Towards Goal     Problem: Patient Education: Go to Patient Education Activity  Goal: Patient/Family Education  Outcome: Progressing Towards Goal     Problem: Patient Education: Go to Patient Education Activity  Goal: Patient/Family Education  Outcome: Progressing Towards Goal

## 2023-01-09 NOTE — ROUTINE PROCESS
Bedside and Verbal shift change report given to Sammy Campuzano RN (oncoming nurse) by Teri Schaefer RN (offgoing nurse). Report included the following information Kardex and MAR.

## 2023-01-09 NOTE — PROGRESS NOTES
CM reviewed chart, spoke with physician and patient's daughter, Karoline Verdugo. Patient's daughter stated that she is waiting to hear from a physician about patient's CT results from Friday. Daughter states that if patient's CT shows that the cancer has metastisized, then they will take patient home on hospice with [facility]. If patient's cancer has not metastisized, then they would still like for patient to go to [facility]. CM explained that [facility] cannot take patient unless she has finished her daily radiation treatments. Daughter stated that she was under the impression the radiation would be complete at the end of the week, and the patient could stay until then and then go to [facility]. CM explained that is would be up to the physician to decide when patient is medically clear for discharge, and CM cannot confirm that patient can stay in hospital until radiation complete. Daughter stated that she thought the physicians would be okay with patient staying. CM made physician aware of conversation and asked physician to reach out to daughter. Physician verbalized understanding of situation.

## 2023-01-09 NOTE — ROUTINE PROCESS
Bedside shift report given to [facility], [address] [facility].

## 2023-01-09 NOTE — PROGRESS NOTES
Hospitalist Progress Note         Yanet Rivera MD          Daily Progress Note: 1/9/2023      Subjective: The patient is seen for follow  up. Gely Otero 78 y.o. female history of carcinoma of the lung with lobectomy in 1996, since then on follow-up with surveillance. She woke up this morning with severe lower extremity weakness and pain unable to stand up and walk. 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 She is recommended to follow-up with oncology, Dr. Shanice Mcfarlane, had MRI and PET scan scheduled January 2023. States the back pain started getting worse, its severe, given oxycodone 10 mg 3 times a day but not helping the pain. Its radiating into the legs, now with severe weakness. She is crying with pain worse with any movement. Due to the mass in the lumbar spine requested for CT of thoracic spine and lumbar spine, consult placed to radiation oncology. As she is not completely paralyzed, radiation oncology recommended to continue steroids at this time and follow-up. Dr. Diedra Bamberger, rad/onc, to start radiation treatment on 01/03. Oncology consult. Continue pain management inpatient for now. Family interested in pursing hospice at this time. Hospice consult placed.      Problem List:  Problem List as of 1/9/2023 Date Reviewed: 12/30/2022            Codes Class Noted - Resolved    Acute back pain ICD-10-CM: M54.9  ICD-9-CM: 724.5  12/30/2022 - Present        Lower extremity weakness ICD-10-CM: R29.898  ICD-9-CM: 729.89  12/30/2022 - Present        Gait disturbance ICD-10-CM: R26.9  ICD-9-CM: 781.2  12/30/2022 - Present        Bronchitis ICD-10-CM: J40  ICD-9-CM: 921  11/16/2021 - Present        Sepsis ([facility] [address] ICD-10-CM: A41.9  ICD-9-CM: 038.9, 995.91  11/13/2021 - Present        Acute and chronic respiratory failure with hypoxia ([facility] [address] ICD-10-CM: J96.21  ICD-9-CM: 518.84, 799.02  11/13/2021 - Present        Lung cancer [facility]) ICD-10-CM: C34.90  ICD-9-CM: 162.9  4/29/2014 - Present        Dyslipidemia ICD-10-CM: E78.5  ICD-9-CM: 272.4  4/29/2014 - Present       Medications reviewed  Current Facility-Administered Medications   Medication Dose Route Frequency    fentaNYL (DURAGESIC) 25 mcg/hr patch 2 Patch  2 Patch TransDERmal Q72H    oxyCODONE IR (ROXICODONE) tablet 10 mg  10 mg Oral Q8H PRN    ALPRAZolam (XANAX) tablet 0.25 mg  0.25 mg Oral TID PRN    docusate sodium (COLACE) capsule 100 mg  100 mg Oral DAILY    cyclobenzaprine (FLEXERIL) tablet 10 mg  10 mg Oral TID PRN    polyethylene glycol (MIRALAX) packet 17 g  17 g Oral QHS    hydrOXYzine pamoate (VISTARIL) capsule 25 mg  25 mg Oral TID PRN    diphenhydrAMINE (BENADRYL) capsule 25 mg  25 mg Oral Q6H PRN    albuterol-ipratropium (DUO-NEB) 2.5 MG-0.5 MG/3 ML  3 mL Nebulization Q4H PRN    budesonide-formoteroL (SYMBICORT) 160-4.5 mcg/actuation HFA inhaler 1 Puff  1 Puff Inhalation BID RT    And    tiotropium bromide (SPIRIVA RESPIMAT) 2.5 mcg /actuation  2 Puff Inhalation DAILY    albuterol (PROVENTIL HFA, VENTOLIN HFA, PROAIR HFA) inhaler 2 Puff  2 Puff Inhalation X3E PRN    folic acid (FOLVITE) tablet 1 mg  1 mg Oral ACB    gabapentin (NEURONTIN) capsule 400 mg  400 mg Oral TID    pantoprazole (PROTONIX) tablet 40 mg  40 mg Oral ACB    sodium chloride (NS) flush 5-40 mL  5-40 mL IntraVENous Q8H    sodium chloride (NS) flush 5-40 mL  5-40 mL IntraVENous PRN    acetaminophen (TYLENOL) tablet 650 mg  650 mg Oral Q6H PRN    Or    acetaminophen (TYLENOL) suppository 650 mg  650 mg Rectal Q6H PRN    ondansetron (ZOFRAN ODT) tablet 4 mg  4 mg Oral Q6H PRN    Or    ondansetron (ZOFRAN) injection 4 mg  4 mg IntraVENous Q6H PRN    dexamethasone (DECADRON) 4 mg/mL injection 8 mg  8 mg IntraVENous Q8H       Review of Systems:   A comprehensive review of systems was negative except for that written in the HPI.    Objective:   Physical Exam:     Visit Vitals  /67 (BP 1 Location: Left upper arm, BP Patient Position: At rest;Semi fowlers)   Pulse 70   Temp 98.2 °F (36.8 °C)   Resp 18   Ht 5' 5.98\" (1.676 m)   Wt 81.6 kg (180 lb)   SpO2 97%   BMI 29.07 kg/m²    O2 Flow Rate (L/min): 2 l/min O2 Device: None (Room air)    Temp (24hrs), Av °F (36.7 °C), Min:97.7 °F (36.5 °C), Max:98.3 °F (36.8 °C)    No intake/output data recorded.  1901 -  0700  In: 200 [P.O.:200]  Out: 850 [Urine:850]    General:  Alert, cooperative, no distress, appears stated age. Lungs:   Clear to auscultation bilaterally. Chest wall:  No tenderness or deformity. Heart:  Regular rate and rhythm, S1, S2 normal, no murmur, click, rub or gallop. Abdomen:   Soft, non-tender. Bowel sounds normal. No masses,  No organomegaly. Extremities: Extremities normal, atraumatic, no cyanosis or edema. Pulses: 2+ and symmetric all extremities. Skin: Skin color, texture, turgor normal. No rashes or lesions   Neurologic: CNII-XII intact. No gross sensory or motor deficits     Data Review:       Recent Days:  No results for input(s): WBC, HGB, HCT, PLT, HGBEXT, HCTEXT, PLTEXT, HGBEXT, HCTEXT, PLTEXT in the last 72 hours. No results for input(s): NA, K, CL, CO2, GLU, BUN, CREA, CA, MG, PHOS, ALB, TBIL, TBILI, ALT, INR, INREXT, INREXT in the last 72 hours. No lab exists for component: SGOT    No results for input(s): PH, PCO2, PO2, HCO3, FIO2 in the last 72 hours. 24 Hour Results:  No results found for this or any previous visit (from the past 24 hour(s)). Assessment/     1. Gait disturbance with lower extremity weakness  - D/t cord compression and tumor  - Continue IV dexamethasone as per radiation oncology recommendations, 10 mg of 1 dose now then 8 mg every 8 hours and monitor closely.     2. Severe back pain secondary to suspected spinal mass  - Increased the dose of the oxycodone to long-acting OxyContin 20 mg twice a day with IV morphine breakthrough pain relief. - Mass at L3 that was clearly present 2 weeks ago on MRI and appears to have progressed causing increased pain and possibly lower extremity weakness.  - Discussed with Dr. Leyda Ratliff; per Spine surgeons --> very high complication risk.   - Radiation oncology consulted --> XRT started; 10 total (30 Gy in 10 fractions per Rad Onc starting on 01/03/2023)  - Add fentanyl --> increase to 50mcg; change other meds to PRN  - Add xanax prn    3. 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 COPD  - Mild with no signs of exacerbation on DuoNeb which we will continue      Plan:  Continue supportive care pending hospice evaluation  Will benefit from home hospice care once completed    Care Plan discussed with: Patient/Family    Total time spent with patient: 30 minutes.     Shelbi Garcia MD

## 2023-01-09 NOTE — PROGRESS NOTES
OCCUPATIONAL THERAPY TREATMENT  Patient: Kemar Warren (36 y.o. female)  Date: 1/9/2023  Diagnosis: Acute back pain [M54.9] <principal problem not specified>      Precautions:    Chart, occupational therapy assessment, plan of care, and goals were reviewed. ASSESSMENT  Pt continues with skilled OT/PTA services and is progressing towards goals. Pt received semi-supine in bed upon arrival, AXO x4 and agreeable to DE ANDA/ PTA tx at this time. Overall, pt continues to present with deficits in generalized strength/AROM, coordination, bed mobility, static/dynamic sitting and standing balance and functional activity tolerance during performance of ADLs/mobility (see below for objective details and assist levels). Pt tolerated session fair, SOB noted and pt c/o dizziness at eob. BP taken at 154/79. Pt stated dizziness decreased w/ time but did not stop till return to semi supine position. Pt completed shoulder therex at eob and remaining therex in semi supine in bed. Pt tolerated sitting eob for ~25 minutes. Pt completed UE therex, see grid below for details, to increase strength/ endurance to aid in adl performance. Education provided on energy conservation/ safety awareness, pursed lip breathing and HEP. Pt would benefit from further education and practice. Pt encouraged to complete HEP on own 2-3 x/day as tolerated. Pt appears motivated to improve functional levels. Will continue to progress. Recommend d/c to [facility] once medically appropriate. Other factors to consider for discharge:PLOF, time since onset, severity of deficits and decline from functional baseline. PLAN :  Patient continues to benefit from skilled intervention to address the above impairments. Continue treatment per established plan of care. to address goals.     Recommend with staff: Encourage HEP in prep for ADLs/mobility    Recommend next session: Seated grooming    Recommendation for discharge: (in order for the patient to meet his/her long term goals)  Tae Anand    This discharge recommendation:  Has been made in collaboration with the attending provider and/or case management    IF patient discharges home will need the following DME: TBD       SUBJECTIVE:   Patient stated I am waiting on my therapists.     OBJECTIVE DATA SUMMARY:   Cognitive/Behavioral Status:  Neurologic State: Alert  Orientation Level: Oriented X4  Cognition: Follows commands; Appropriate for age attention/concentration      Functional Mobility and Transfers for ADLs:  Bed Mobility:  Rolling: Minimum assistance;Assist x2; Additional time  Supine to Sit: Minimum assistance; Moderate assistance;Assist x2; Additional time  Sit to Supine: Moderate assistance;Assist x2; Additional time      Balance:  Sitting: Intact; With support  Sitting - Static: Good (unsupported)  Sitting - Dynamic: Good (unsupported); Supported sitting    Therapeutic Exercises:   Exercise Sets Reps AROM AAROM PROM Self PROM Comments   Shoulder flex/ext 1 10 [x] [] [] []    Elbow flex/ext 1 10 [x] [] [] []    Wrist flex/ext 1 10 [x] [] [] []    Hand flex/ ext 1 10 [x] [] [] []        Pain:  Pt reports burning and tingling in B LE. Activity Tolerance:   Fair and requires frequent rest breaks    After treatment patient left in no apparent distress:   Supine in bed, Call bell within reach, Bed / chair alarm activated, Caregiver / family present, and Side rails x 3, bed locked and in lowest position    COMMUNICATION/COLLABORATION:   The patients plan of care was discussed with: Physical therapy assistant and Registered nurse. PT/OT sessions occurred together for increased safety of pt and clinician.      JALYN Roberson  Time Calculation: 38 mins     Problem: Self Care Deficits Care Plan (Adult)  Goal: *Acute Goals and Plan of Care (Insert Text)  Description:   FUNCTIONAL STATUS PRIOR TO ADMISSION: Patient was modified independent using a rolling walker for functional mobility. Patient required minimal assistance for basic and instrumental ADLs. HOME SUPPORT: The patient lived alone with daughter to provide assistance. Occupational Therapy Goals  Initiated 1/4/2023  Patient Goal: Pt did not state. 1.  Patient will perform lower body dressing with minimal assistance/contact guard assist within 7 day(s). 2.  Patient will perform grooming with modified independence within 7 day(s). 3.  Patient will perform bathing with minimal assistance/contact guard assist within 7 day(s). 4.  Patient will perform toilet transfers with minimal assistance/contact guard assist within 7 day(s). 5.  Patient will perform all aspects of toileting with minimal assistance/contact guard assist within 7 day(s). 6.  Patient will participate in upper extremity therapeutic exercise/activities with independence within 7 day(s). 7.  Patient will utilize energy conservation techniques during functional activities with verbal cues within 7 day(s).   Outcome: Progressing Towards Goal

## 2023-01-09 NOTE — PROGRESS NOTES
Problem: Mobility Impaired (Adult and Pediatric)  Goal: *Acute Goals and Plan of Care (Insert Text)  Description: FUNCTIONAL STATUS PRIOR TO ADMISSION: Patient was modified independent using a rolling walker for functional mobility. Patient required minimal assistance for basic and instrumental ADLs. HOME SUPPORT PRIOR TO ADMISSION: The patient lived alone with daughter to provide assistance. Physical Therapy Goals  Initiated 1/4/2023  Pt stated goal: to go home  Pt will be I with LE HEP in 7 days. Pt will perform bed mobility with mod I utilizing log roll technique in 7 days. Pt will perform transfers with mod I in 7 days. Pt will amb 25-50 feet with LRAD safely with mod I in 7 days. Pt will verbalize and demonstrate compliance with spinal precautions to include no bending, lifting or twisting in 7 days. Outcome: Progressing Towards Goal   PHYSICAL THERAPY TREATMENT  Patient: Gerard Moya (89 y.o. female)  Date: 1/9/2023  Diagnosis: Acute back pain [M54.9] <principal problem not specified>      Precautions:    Chart, physical therapy assessment, plan of care and goals were reviewed. ASSESSMENT  Patient continues with skilled PT services and is progressing towards goals. Pt found semi supine upon PTA/DE ANDA arrival, agreeable to session. (See below for objective details and assist levels). Overall pt tolerated session fair today with bed mobility and sitting EOB ~25mins, limited by activity tolerance and endurance. Pt demo's improved functional mobility requiring less A and able to tolerate seated therex this session. Pt demo'd supine >sit with min A x2, once EOB pt reports dizziness /79, required extended amount of time to decrease, did not resolve completely until supine in bed. Pt required education for safety awareness, energy conservation and pursed lip breathing. Will continue to benefit from skilled PT services, and will continue to progress as tolerated.      Current Level of Function Impacting Discharge (mobility/balance): medical    Other factors to consider for discharge: PLOF and amount of A needed for mobility          PLAN :  Patient continues to benefit from skilled intervention to address the above impairments. Continue treatment per established plan of care to address goals. Recommend with staff: Encourage HEP in prep for ADLs/mobility and Frequent repositioning to prevent skin breakdown    Recommendation for discharge: (in order for the patient to meet his/her long term goals)  Tae Anand    This discharge recommendation:  Has been made in collaboration with the attending provider and/or case management    IF patient discharges home will need the following DME: to be determined (TBD)       SUBJECTIVE:   Patient stated I'm sorry to confuse you.  when asked about pain     OBJECTIVE DATA SUMMARY:   Critical Behavior:  Neurologic State: Alert  Orientation Level: Oriented X4  Cognition: Follows commands, Appropriate for age attention/concentration     Functional Mobility Training:  Bed Mobility:  Rolling: Minimum assistance;Assist x2; Additional time  Supine to Sit: Minimum assistance; Moderate assistance;Assist x2; Additional time  Sit to Supine: Moderate assistance;Assist x2; Additional time  Balance:  Sitting: Intact; With support  Sitting - Static: Good (unsupported)  Sitting - Dynamic: Good (unsupported); Supported sitting  Ambulation/Gait Training:  Therapeutic Exercises:       EXERCISE   Sets   Reps   Active Active Assist   Passive Self ROM   Comments   Ankle Pumps   [] [] [] []    Quad Sets/Glut Sets   [] [] [] []    Hamstring Sets   [] [] [] []    Short Arc Quads   [] [] [] []    Heel Slides   [] [x] [] []    Straight Leg Raises   [] [] [] []    Hip abd/add   [] [] [] []    Long Arc Quads 1 5 [] [x] [] []    Marching 1 5 [] [x] [] []       [] [] [] []       Pain Rating:  Pt reports tingling and burning in BLE    Activity Tolerance:   Fair, requires frequent rest breaks, and observed SOB with activity    After treatment patient left in no apparent distress:   Bed locked and returned to lowest position, Supine in bed, Call bell within reach, and Caregiver / family present    COMMUNICATION/COLLABORATION:   The patients plan of care was discussed with: Occupational therapy assistant and Registered nurse.      PTA/DE ANDA cotreat to maximize pt and clinician safety     Sabrina Ontiveros PTA, PT   Time Calculation: 40 mins

## 2023-01-09 NOTE — PROGRESS NOTES
Rehab Progress Note    Patient: Carlos Bocanegra MRN: [id number]  SSN: [id number]    YOB: 1940  Age: 78 y.o. Sex: female      Admit date: 12/30/2022   LOS (days): 10    CC: Difficulty walking     Subjective:      Patient seen today with family at the bedside, appears to be in NAD at this time. Currently denies any cp, sob, n, v, diarrhea. Notes some improvement in RLE strength. Awaiting completion of of radiation treatment. Discussed and answered additional questions. HPI (per initial consult note): This is a 78 y.o. female with a past medical history including carcinoma of the lung, COPD. Patient initially presented to [facility] with severe lower extremity weakness not being unable to stand and walk. She was noted to recently undergo MRI of the lumbar which showed a intradural mass posterior to L3. Patient reports she started having worsening pain and weakness. Patient was started on steroids and radiation oncology was consulted. Patient started radiation treatments on 1/3/2023. Patient with pain management with fentanyl patch recently started. Patient family seen today while she is sitting in bed eating lunch appears to be in no acute distress at this time. Patient endorses she is having some improvement in strength as well as pain management. Patient notes that she is eager to continue therapy to get stronger.          Current Facility-Administered Medications   Medication    fentaNYL (DURAGESIC) 25 mcg/hr patch 2 Patch    oxyCODONE IR (ROXICODONE) tablet 10 mg    ALPRAZolam (XANAX) tablet 0.25 mg    docusate sodium (COLACE) capsule 100 mg    cyclobenzaprine (FLEXERIL) tablet 10 mg    polyethylene glycol (MIRALAX) packet 17 g    hydrOXYzine pamoate (VISTARIL) capsule 25 mg    diphenhydrAMINE (BENADRYL) capsule 25 mg    albuterol-ipratropium (DUO-NEB) 2.5 MG-0.5 MG/3 ML    budesonide-formoteroL (SYMBICORT) 160-4.5 mcg/actuation HFA inhaler 1 Puff    And tiotropium bromide (SPIRIVA RESPIMAT) 2.5 mcg /actuation    albuterol (PROVENTIL HFA, VENTOLIN HFA, PROAIR HFA) inhaler 2 Puff    folic acid (FOLVITE) tablet 1 mg    gabapentin (NEURONTIN) capsule 400 mg    pantoprazole (PROTONIX) tablet 40 mg    sodium chloride (NS) flush 5-40 mL    sodium chloride (NS) flush 5-40 mL    acetaminophen (TYLENOL) tablet 650 mg    Or    acetaminophen (TYLENOL) suppository 650 mg    ondansetron (ZOFRAN ODT) tablet 4 mg    Or    ondansetron (ZOFRAN) injection 4 mg    dexamethasone (DECADRON) 4 mg/mL injection 8 mg          Objective:     Vitals:    01/08/23 2139 01/08/23 2146 01/09/23 0335 01/09/23 0701   BP:  121/66 136/68 133/67   Pulse:  94 98 70   Resp:  18 18 18   Temp:  97.7 °F (36.5 °C) 97.7 °F (36.5 °C) 98.2 °F (36.8 °C)   SpO2: 96% 96% 97% 97%   Weight:       Height:            Physical Exam:  General: NAD, pleasant and cooperative   HEENT: moist oral mucosa   CV: RRR  Respiratory: no increased WOB  Abdomen: nondistended  Extremities: no peripheral edema   Musculoskeletal: moving BUE at least to gravity, BLE significant, RLE greater weakness  Neuro: alert, normal speech      Labs:  No results for input(s): WBC, HGB, PLT, NA, K, BUN, CREA, HGBEXT, PLTEXT in the last 72 hours. Imaging (last 24 hrs):   No results found. 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 Pending radiation therapy. Barriers to rehab: Radiation therapy, pending imaging      Continue PT/OT in the acute setting. Will continue to follow.        Signed By: Maryjane Priest NP     January 9, 2023    Physical Medicine and Rehabilitation

## 2023-01-09 NOTE — ROUTINE PROCESS
Notified Dr. Marta Ferrer that the patient is stating she is in pain despite the oxycodone and flexeril given per [facility]. She is requesting a pain medication to be given sooner or in addition to scheduled pain medications. Notified Dr. Marta Ferrer, new order received for oxycodone 10mg PO Q4H for severe pain. No further orders.      Teja Velez RN

## 2023-01-10 ENCOUNTER — HOSPITAL ENCOUNTER (INPATIENT)
Dept: RADIATION THERAPY | Age: 83
Discharge: HOME | DRG: 055 | End: 2023-01-10
Payer: MEDICARE

## 2023-01-10 PROCEDURE — 74011000250 HC RX REV CODE- 250: Performed by: HOSPITALIST

## 2023-01-10 PROCEDURE — 97530 THERAPEUTIC ACTIVITIES: CPT

## 2023-01-10 PROCEDURE — 74011250637 HC RX REV CODE- 250/637: Performed by: HOSPITALIST

## 2023-01-10 PROCEDURE — 74011250637 HC RX REV CODE- 250/637: Performed by: INTERNAL MEDICINE

## 2023-01-10 PROCEDURE — 94640 AIRWAY INHALATION TREATMENT: CPT

## 2023-01-10 PROCEDURE — 74011250637 HC RX REV CODE- 250/637: Performed by: STUDENT IN AN ORGANIZED HEALTH CARE EDUCATION/TRAINING PROGRAM

## 2023-01-10 PROCEDURE — 74011250636 HC RX REV CODE- 250/636: Performed by: HOSPITALIST

## 2023-01-10 PROCEDURE — 65270000029 HC RM PRIVATE

## 2023-01-10 PROCEDURE — 77412 RADIATION TX DELIVERY LVL 3: CPT

## 2023-01-10 RX ORDER — DOCUSATE SODIUM 100 MG/1
100 CAPSULE, LIQUID FILLED ORAL
Status: DISCONTINUED | OUTPATIENT
Start: 2023-01-10 | End: 2023-01-18 | Stop reason: HOSPADM

## 2023-01-10 RX ADMIN — SODIUM CHLORIDE, PRESERVATIVE FREE 10 ML: 5 INJECTION INTRAVENOUS at 06:22

## 2023-01-10 RX ADMIN — DEXAMETHASONE SODIUM PHOSPHATE 8 MG: 4 INJECTION, SOLUTION INTRA-ARTICULAR; INTRALESIONAL; INTRAMUSCULAR; INTRAVENOUS; SOFT TISSUE at 22:14

## 2023-01-10 RX ADMIN — FOLIC ACID 1 MG: 1 TABLET ORAL at 06:36

## 2023-01-10 RX ADMIN — GABAPENTIN 400 MG: 400 CAPSULE ORAL at 10:00

## 2023-01-10 RX ADMIN — DEXAMETHASONE SODIUM PHOSPHATE 8 MG: 4 INJECTION, SOLUTION INTRA-ARTICULAR; INTRALESIONAL; INTRAMUSCULAR; INTRAVENOUS; SOFT TISSUE at 13:34

## 2023-01-10 RX ADMIN — CYCLOBENZAPRINE 10 MG: 10 TABLET, FILM COATED ORAL at 13:34

## 2023-01-10 RX ADMIN — BUDESONIDE AND FORMOTEROL FUMARATE DIHYDRATE 1 PUFF: 160; 4.5 AEROSOL RESPIRATORY (INHALATION) at 21:09

## 2023-01-10 RX ADMIN — OXYCODONE HYDROCHLORIDE 10 MG: 5 TABLET ORAL at 12:07

## 2023-01-10 RX ADMIN — GABAPENTIN 400 MG: 400 CAPSULE ORAL at 17:01

## 2023-01-10 RX ADMIN — DEXAMETHASONE SODIUM PHOSPHATE 8 MG: 4 INJECTION, SOLUTION INTRA-ARTICULAR; INTRALESIONAL; INTRAMUSCULAR; INTRAVENOUS; SOFT TISSUE at 06:22

## 2023-01-10 RX ADMIN — GABAPENTIN 400 MG: 400 CAPSULE ORAL at 22:14

## 2023-01-10 RX ADMIN — DOCUSATE SODIUM 100 MG: 100 CAPSULE, LIQUID FILLED ORAL at 09:58

## 2023-01-10 RX ADMIN — SODIUM CHLORIDE, PRESERVATIVE FREE 10 ML: 5 INJECTION INTRAVENOUS at 22:44

## 2023-01-10 RX ADMIN — PANTOPRAZOLE SODIUM 40 MG: 40 TABLET, DELAYED RELEASE ORAL at 06:36

## 2023-01-10 RX ADMIN — OXYCODONE HYDROCHLORIDE 10 MG: 5 TABLET ORAL at 22:13

## 2023-01-10 RX ADMIN — SODIUM CHLORIDE, PRESERVATIVE FREE 10 ML: 5 INJECTION INTRAVENOUS at 13:34

## 2023-01-10 NOTE — PROGRESS NOTES
Problem: Falls - Risk of  Goal: *Absence of Falls  Description: Document Horacio Graft Fall Risk and appropriate interventions in the flowsheet. Outcome: Progressing Towards Goal  Note: Fall Risk Interventions:  Mobility Interventions: PT Consult for mobility concerns         Medication Interventions: Bed/chair exit alarm    Elimination Interventions: Bed/chair exit alarm, Call light in reach              Problem: Patient Education: Go to Patient Education Activity  Goal: Patient/Family Education  Outcome: Progressing Towards Goal     Problem: Pain  Goal: *Control of Pain  Outcome: Progressing Towards Goal     Problem: Patient Education: Go to Patient Education Activity  Goal: Patient/Family Education  Outcome: Progressing Towards Goal     Problem: General Medical Care Plan  Goal: *Vital signs within specified parameters  Outcome: Progressing Towards Goal  Goal: *Labs within defined limits  Outcome: Progressing Towards Goal  Goal: *Absence of infection signs and symptoms  Outcome: Progressing Towards Goal  Goal: *Optimal pain control at patient's stated goal  Outcome: Progressing Towards Goal  Goal: *Skin integrity maintained  Outcome: Progressing Towards Goal  Goal: *Fluid volume balance  Outcome: Progressing Towards Goal  Goal: *Optimize nutritional status  Outcome: Progressing Towards Goal  Goal: *Anxiety reduced or absent  Outcome: Progressing Towards Goal  Goal: *Progressive mobility and function (eg: ADL's)  Outcome: Progressing Towards Goal     Problem: Patient Education: Go to Patient Education Activity  Goal: Patient/Family Education  Outcome: Progressing Towards Goal     Problem: Patient Education: Go to Patient Education Activity  Goal: Patient/Family Education  Outcome: Progressing Towards Goal     Problem: Pressure Injury - Risk of  Goal: *Prevention of pressure injury  Description: Document Al Scale and appropriate interventions in the flowsheet.   Outcome: Progressing Towards Goal     Problem: Patient Education: Go to Patient Education Activity  Goal: Patient/Family Education  Outcome: Progressing Towards Goal     Problem: Patient Education: Go to Patient Education Activity  Goal: Patient/Family Education  Outcome: Progressing Towards Goal     Problem: Patient Education: Go to Patient Education Activity  Goal: Patient/Family Education  Outcome: Progressing Towards Goal

## 2023-01-10 NOTE — PROGRESS NOTES
1326: CM spoke with [facility]. They stated that they will be able to accept the patient either Friday or Saturday once the patient has finished radiation treatments. BARTOLOME will continue to follow. __________________________________________________    5523: CM reviewed chart and spoke with primary physician. After physician spoke with patient and daughter again, the plan is for patient to stay at hospital to complete radiation treatments through Friday. The family would then like for patient to go to [facility]. CM has reached out to [facility] to ask them if they will be able to accept patient once radiation treatments are complete. CM will continue to follow.

## 2023-01-10 NOTE — PROGRESS NOTES
OCCUPATIONAL THERAPY TREATMENT  Patient: Angela Foote (43 y.o. female)  Date: 1/10/2023  Diagnosis: Acute back pain [M54.9] <principal problem not specified>      Precautions:    Chart, occupational therapy assessment, plan of care, and goals were reviewed. ASSESSMENT  Pt continues with skilled OT services and is progressing towards goals. Upon DE ANDA arrival, pt semi supine in bed and agreeable to tx session at this time. Overall, pt continues to present with deficits in generalized strength/AROM, coordination, bed mobility, static/dynamic sitting and standing balance and functional activity tolerance during performance of ADLs/mobility (see below for objective details and assist levels). Pt noted with limited progress at this time with mobility due to fatigue and pain. Pt completed bed mobility with Min-ModA x2 overall requiring assist with BLE and with trunk. Pt completed transfers this date x4 trials with MaxA x2, minimally able to clear bottom from EOB with first 2 trials. During last 2 trials pt able to use Katlin James and complete sit>stand with MaxA x2 but able to clear bottom further from EOB. Pt able to complete seated EOB grooming with Juliette overall for thoroughness. Pt then returned to supine with [address] [place] x2 requiring assist with BLE. Pt required total A for doffing of B socks due to limited reach and bend. Pt repositioned and left semi supine in bed with call bell within reach and needs met. Recommend d/c to [facility] once medically appropriate. Other factors to consider for discharge: family/social support, DME, time since onset, severity of deficits, decline from functional baseline         PLAN :  Patient continues to benefit from skilled intervention to address the above impairments. Continue treatment per established plan of care to address goals.     Recommendation for discharge: (in order for the patient to meet his/her long term goals)  Tae Thompson discharge recommendation:  Has been made in collaboration with the attending provider and/or case management    IF patient discharges home will need the following DME: TBD       SUBJECTIVE:   Patient stated I have had a long day.     OBJECTIVE DATA SUMMARY:   Cognitive/Behavioral Status:  Neurologic State: Alert  Orientation Level: Oriented X4  Cognition: Follows commands    Functional Mobility and Transfers for ADLs:  Bed Mobility:  Rolling: Minimum assistance;Assist x2  Supine to Sit: Minimum assistance; Moderate assistance;Assist x2  Sit to Supine: Moderate assistance;Assist x2  Scooting: Minimum assistance;Assist x2    Transfers:  Sit to Stand: Maximum assistance;Assist x2    Balance:  Sitting: Impaired; With support  Sitting - Static: Good (unsupported)  Sitting - Dynamic: Fair (occasional)  Standing: Impaired; With support  Standing - Static: Constant support;Poor    ADL Intervention:  Grooming  Position Performed: Seated edge of bed  Brushing/Combing Hair: Minimum assistance    Lower Body Dressing Assistance  Socks: Total assistance (dependent)    Pain:  8/10 pain in B legs and back with mobility    Activity Tolerance:   Fair and requires rest breaks  Please refer to the flowsheet for vital signs taken during this treatment. After treatment patient left in no apparent distress:   Bed locked and in lowest position  Supine in bed, Call bell within reach, Bed / chair alarm activated, and Side rails x 3    COMMUNICATION/COLLABORATION:   The patients plan of care was discussed with: Physical therapist, Physical therapy assistant, Registered nurse, and Certified nursing assistant/patient care technician. Cotreat with PT for increased safety for pt and clinician.     ADILSON Lopez  Time Calculation: 34 mins    Problem: Self Care Deficits Care Plan (Adult)  Goal: *Acute Goals and Plan of Care (Insert Text)  Description:   FUNCTIONAL STATUS PRIOR TO ADMISSION: Patient was modified independent using a rolling walker for functional mobility. Patient required minimal assistance for basic and instrumental ADLs. HOME SUPPORT: The patient lived alone with daughter to provide assistance. Occupational Therapy Goals  Initiated 1/4/2023  Patient Goal: Pt did not state. 1.  Patient will perform lower body dressing with minimal assistance/contact guard assist within 7 day(s). 2.  Patient will perform grooming with modified independence within 7 day(s). 3.  Patient will perform bathing with minimal assistance/contact guard assist within 7 day(s). 4.  Patient will perform toilet transfers with minimal assistance/contact guard assist within 7 day(s). 5.  Patient will perform all aspects of toileting with minimal assistance/contact guard assist within 7 day(s). 6.  Patient will participate in upper extremity therapeutic exercise/activities with independence within 7 day(s). 7.  Patient will utilize energy conservation techniques during functional activities with verbal cues within 7 day(s).   Outcome: Progressing Towards Goal

## 2023-01-10 NOTE — ROUTINE PROCESS
Bedside and Verbal shift change report given to Mishel Gentile LPN (oncoming nurse) by Sammy Campuzano RN (offgoing nurse). Report included the following information Kardex and MAR.

## 2023-01-10 NOTE — PROGRESS NOTES
Hospitalist Progress Note         May Tovar MD          Daily Progress Note: 1/10/2023      Subjective: The patient is seen for follow  up. Gely Otero 78 y.o. female history of carcinoma of the lung with lobectomy in 1996, since then on follow-up with surveillance. She woke up this morning with severe lower extremity weakness and pain unable to stand up and walk. 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 She is recommended to follow-up with oncology, Dr. Sanjeev Guajardo, had MRI and PET scan scheduled January 2023. States the back pain started getting worse, its severe, given oxycodone 10 mg 3 times a day but not helping the pain. Its radiating into the legs, now with severe weakness. She is crying with pain worse with any movement. Due to the mass in the lumbar spine requested for CT of thoracic spine and lumbar spine, consult placed to radiation oncology. As she is not completely paralyzed, radiation oncology recommended to continue steroids at this time and follow-up. Dr. Skylar Franco, rad/onc, to start radiation treatment on 01/03. Oncology consult. Continue pain management inpatient for now.   Family interested in pursuing inpatient rehab post radiation therapy    Problem List:  Problem List as of 1/10/2023 Date Reviewed: 12/30/2022            Codes Class Noted - Resolved    Acute back pain ICD-10-CM: M54.9  ICD-9-CM: 724.5  12/30/2022 - Present        Lower extremity weakness ICD-10-CM: R29.898  ICD-9-CM: 729.89  12/30/2022 - Present        Gait disturbance ICD-10-CM: R26.9  ICD-9-CM: 781.2  12/30/2022 - Present        Bronchitis ICD-10-CM: J40  ICD-9-CM: 490  11/16/2021 - Present        Sepsis ([facility] [address] ICD-10-CM: A41.9  ICD-9-CM: 038.9, 995.91  11/13/2021 - Present        Acute and chronic respiratory failure with hypoxia ([facility] [address] ICD-10-CM: J96.21  ICD-9-CM: 518.84, 799.02  11/13/2021 - Present        Lung cancer [facility]) ICD-10-CM: C34.90  ICD-9-CM: 162.9  4/29/2014 - Present        Dyslipidemia ICD-10-CM: E78.5  ICD-9-CM: 272.4  4/29/2014 - Present       Medications reviewed  Current Facility-Administered Medications   Medication Dose Route Frequency    oxyCODONE IR (ROXICODONE) tablet 10 mg  10 mg Oral Q4H PRN    fentaNYL (DURAGESIC) 25 mcg/hr patch 2 Patch  2 Patch TransDERmal Q72H    ALPRAZolam (XANAX) tablet 0.25 mg  0.25 mg Oral TID PRN    docusate sodium (COLACE) capsule 100 mg  100 mg Oral DAILY    cyclobenzaprine (FLEXERIL) tablet 10 mg  10 mg Oral TID PRN    polyethylene glycol (MIRALAX) packet 17 g  17 g Oral QHS    hydrOXYzine pamoate (VISTARIL) capsule 25 mg  25 mg Oral TID PRN    diphenhydrAMINE (BENADRYL) capsule 25 mg  25 mg Oral Q6H PRN    albuterol-ipratropium (DUO-NEB) 2.5 MG-0.5 MG/3 ML  3 mL Nebulization Q4H PRN    budesonide-formoteroL (SYMBICORT) 160-4.5 mcg/actuation HFA inhaler 1 Puff  1 Puff Inhalation BID RT    And    tiotropium bromide (SPIRIVA RESPIMAT) 2.5 mcg /actuation  2 Puff Inhalation DAILY    albuterol (PROVENTIL HFA, VENTOLIN HFA, PROAIR HFA) inhaler 2 Puff  2 Puff Inhalation F6D PRN    folic acid (FOLVITE) tablet 1 mg  1 mg Oral ACB    gabapentin (NEURONTIN) capsule 400 mg  400 mg Oral TID    pantoprazole (PROTONIX) tablet 40 mg  40 mg Oral ACB    sodium chloride (NS) flush 5-40 mL  5-40 mL IntraVENous Q8H    sodium chloride (NS) flush 5-40 mL  5-40 mL IntraVENous PRN    acetaminophen (TYLENOL) tablet 650 mg  650 mg Oral Q6H PRN    Or    acetaminophen (TYLENOL) suppository 650 mg  650 mg Rectal Q6H PRN    ondansetron (ZOFRAN ODT) tablet 4 mg  4 mg Oral Q6H PRN    Or    ondansetron (ZOFRAN) injection 4 mg  4 mg IntraVENous Q6H PRN    dexamethasone (DECADRON) 4 mg/mL injection 8 mg  8 mg IntraVENous Q8H       Review of Systems:   A comprehensive review of systems was negative except for that written in the HPI.    Objective:   Physical Exam:     Visit Vitals  /62 (BP 1 Location: Right upper arm, BP Patient Position: Semi fowlers)   Pulse 66   Temp 97.6 °F (36.4 °C)   Resp 16   Ht 5' 5.98\" (1.676 m)   Wt 81.6 kg (180 lb)   SpO2 96%   BMI 29.07 kg/m²    O2 Flow Rate (L/min): 2 l/min O2 Device: Nasal cannula    Temp (24hrs), Av.5 °F (36.4 °C), Min:97.2 °F (36.2 °C), Max:97.8 °F (36.6 °C)    No intake/output data recorded.  1901 - 01/10 0700  In: 320 [P.O.:320]  Out: 1050 [Urine:1050]    General:  Alert, cooperative, no distress, appears stated age. Lungs:   Clear to auscultation bilaterally. Chest wall:  No tenderness or deformity. Heart:  Regular rate and rhythm, S1, S2 normal, no murmur, click, rub or gallop. Abdomen:   Soft, non-tender. Bowel sounds normal. No masses,  No organomegaly. Extremities: Extremities normal, atraumatic, no cyanosis or edema. Pulses: 2+ and symmetric all extremities. Skin: Skin color, texture, turgor normal. No rashes or lesions   Neurologic: CNII-XII intact. No gross sensory or motor deficits     Data Review:       Recent Days:  No results for input(s): WBC, HGB, HCT, PLT, HGBEXT, HCTEXT, PLTEXT, HGBEXT, HCTEXT, PLTEXT in the last 72 hours. No results for input(s): NA, K, CL, CO2, GLU, BUN, CREA, CA, MG, PHOS, ALB, TBIL, TBILI, ALT, INR, INREXT, INREXT in the last 72 hours. No lab exists for component: SGOT    No results for input(s): PH, PCO2, PO2, HCO3, FIO2 in the last 72 hours. 24 Hour Results:  No results found for this or any previous visit (from the past 24 hour(s)). Assessment/     1. Gait disturbance with lower extremity weakness  - D/t cord compression and tumor  - Continue IV dexamethasone as per radiation oncology recommendations, 10 mg of 1 dose now then 8 mg every 8 hours and monitor closely.     2. Severe back pain secondary to suspected spinal mass  - Increased the dose of the oxycodone to long-acting OxyContin 20 mg twice a day with IV morphine breakthrough pain relief. - Mass at L3 that was clearly present 2 weeks ago on MRI and appears to have progressed causing increased pain and possibly lower extremity weakness.  - Discussed with Dr. Clara Gabriel; per Spine surgeons --> very high complication risk.   - Radiation oncology consulted --> XRT started; 10 total (30 Gy in 10 fractions per Rad Onc starting on 01/03/2023)  - Add fentanyl --> increase to 50mcg; change other meds to PRN  - Add xanax prn    3. 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 COPD  - Mild with no signs of exacerbation on DuoNeb which we will continue      Plan:  Continue supportive care   Continue pain control and increase frequency of oral pain medication  Docusate 100 mg twice daily for constipation  Anticipate discharge to inpatient rehab over the weekend    Care Plan discussed with: Patient/Family    Total time spent with patient: 30 minutes.     Mar Valdez MD

## 2023-01-10 NOTE — PROGRESS NOTES
Problem: Mobility Impaired (Adult and Pediatric)  Goal: *Acute Goals and Plan of Care (Insert Text)  Description: FUNCTIONAL STATUS PRIOR TO ADMISSION: Patient was modified independent using a rolling walker for functional mobility. Patient required minimal assistance for basic and instrumental ADLs. HOME SUPPORT PRIOR TO ADMISSION: The patient lived alone with daughter to provide assistance. Physical Therapy Goals  Initiated 1/4/2023  Pt stated goal: to go home  Pt will be I with LE HEP in 7 days. Pt will perform bed mobility with mod I utilizing log roll technique in 7 days. Pt will perform transfers with mod I in 7 days. Pt will amb 25-50 feet with LRAD safely with mod I in 7 days. Pt will verbalize and demonstrate compliance with spinal precautions to include no bending, lifting or twisting in 7 days. Outcome: Progressing Towards Goal   PHYSICAL THERAPY TREATMENT  Patient: Luma Wilkins (2 y.o. female)  Date: 1/10/2023  Diagnosis: Acute back pain [M54.9] <principal problem not specified>      Precautions:    Chart, physical therapy assessment, plan of care and goals were reviewed. ASSESSMENT  Patient continues with skilled PT services and is progressing towards goals. Pt found semi supine upon PTA/DE ANDA arrival, agreeable to session. (See below for objective details and assist levels). Overall pt tolerated session fair today with bed mobility and transfers, requiring less A. Pt demo's great progress towards functional goals, able to clear EOB x2 with max A x2 and with cortez steady Ax3. Pt requiried additional A due BLE sliding and knee buckling. Will continue to benefit from skilled PT services, and will continue to progress as tolerated.      Current Level of Function Impacting Discharge (mobility/balance): functional     Other factors to consider for discharge: amount of needed for mobility and pain          PLAN :  Patient continues to benefit from skilled intervention to address the above impairments. Continue treatment per established plan of care to address goals. Recommend with staff: Encourage HEP in prep for ADLs/mobility and Frequent repositioning to prevent skin breakdown    Recommendation for discharge: (in order for the patient to meet his/her long term goals)  Tae Thompson discharge recommendation:  Has been made in collaboration with the attending provider and/or case management    IF patient discharges home will need the following DME: to be determined (TBD)       SUBJECTIVE:   Patient stated Lets do it again .     OBJECTIVE DATA SUMMARY:   Critical Behavior:  Neurologic State: Alert  Orientation Level: Oriented X4  Cognition: Follows commands     Functional Mobility Training:  Bed Mobility:  Rolling: Minimum assistance;Assist x2  Supine to Sit: Minimum assistance; Moderate assistance;Assist x2  Sit to Supine: Moderate assistance;Assist x2  Scooting: Minimum assistance;Assist x2  Transfers:  Sit to Stand: Maximum assistance;Assist x2  Stand to Sit: Maximum assistance;Assist x2  Balance:  Sitting: Impaired; With support  Sitting - Static: Good (unsupported)  Sitting - Dynamic: Fair (occasional)  Standing: Impaired; With support  Standing - Static: Constant support;Poor  Ambulation/Gait Training:          Pain Ratin/10 pain in B legs and back with mobility    Activity Tolerance:   Fair and requires frequent rest breaks    After treatment patient left in no apparent distress:   Bed locked and returned to lowest position, Supine in bed and Call bell within reach    COMMUNICATION/COLLABORATION:   The patients plan of care was discussed with: Occupational therapy assistant and Registered nurse.      PTA/ADILSON rodriguez to maximize pt and clinician safety     Giles Paige PTA, PT   Time Calculation: 34 mins

## 2023-01-11 ENCOUNTER — HOSPITAL ENCOUNTER (INPATIENT)
Dept: RADIATION THERAPY | Age: 83
Discharge: HOME | DRG: 055 | End: 2023-01-11
Payer: MEDICARE

## 2023-01-11 PROCEDURE — 77412 RADIATION TX DELIVERY LVL 3: CPT

## 2023-01-11 PROCEDURE — 94761 N-INVAS EAR/PLS OXIMETRY MLT: CPT

## 2023-01-11 PROCEDURE — 74011250637 HC RX REV CODE- 250/637: Performed by: STUDENT IN AN ORGANIZED HEALTH CARE EDUCATION/TRAINING PROGRAM

## 2023-01-11 PROCEDURE — 77010033678 HC OXYGEN DAILY

## 2023-01-11 PROCEDURE — 94640 AIRWAY INHALATION TREATMENT: CPT

## 2023-01-11 PROCEDURE — 74011000250 HC RX REV CODE- 250: Performed by: HOSPITALIST

## 2023-01-11 PROCEDURE — 74011250637 HC RX REV CODE- 250/637: Performed by: HOSPITALIST

## 2023-01-11 PROCEDURE — 65270000029 HC RM PRIVATE

## 2023-01-11 PROCEDURE — 74011250637 HC RX REV CODE- 250/637: Performed by: INTERNAL MEDICINE

## 2023-01-11 PROCEDURE — 74011250636 HC RX REV CODE- 250/636: Performed by: HOSPITALIST

## 2023-01-11 RX ADMIN — BUDESONIDE AND FORMOTEROL FUMARATE DIHYDRATE 1 PUFF: 160; 4.5 AEROSOL RESPIRATORY (INHALATION) at 07:35

## 2023-01-11 RX ADMIN — TIOTROPIUM BROMIDE INHALATION SPRAY 2 PUFF: 3.12 SPRAY, METERED RESPIRATORY (INHALATION) at 07:35

## 2023-01-11 RX ADMIN — PANTOPRAZOLE SODIUM 40 MG: 40 TABLET, DELAYED RELEASE ORAL at 10:03

## 2023-01-11 RX ADMIN — CYCLOBENZAPRINE 10 MG: 10 TABLET, FILM COATED ORAL at 15:32

## 2023-01-11 RX ADMIN — GABAPENTIN 400 MG: 400 CAPSULE ORAL at 15:21

## 2023-01-11 RX ADMIN — FOLIC ACID 1 MG: 1 TABLET ORAL at 10:03

## 2023-01-11 RX ADMIN — DOCUSATE SODIUM 100 MG: 100 CAPSULE, LIQUID FILLED ORAL at 10:03

## 2023-01-11 RX ADMIN — CYCLOBENZAPRINE 10 MG: 10 TABLET, FILM COATED ORAL at 06:15

## 2023-01-11 RX ADMIN — DEXAMETHASONE SODIUM PHOSPHATE 8 MG: 4 INJECTION, SOLUTION INTRA-ARTICULAR; INTRALESIONAL; INTRAMUSCULAR; INTRAVENOUS; SOFT TISSUE at 15:21

## 2023-01-11 RX ADMIN — OXYCODONE HYDROCHLORIDE 10 MG: 5 TABLET ORAL at 06:15

## 2023-01-11 RX ADMIN — OXYCODONE HYDROCHLORIDE 10 MG: 5 TABLET ORAL at 11:34

## 2023-01-11 RX ADMIN — BUDESONIDE AND FORMOTEROL FUMARATE DIHYDRATE 1 PUFF: 160; 4.5 AEROSOL RESPIRATORY (INHALATION) at 19:44

## 2023-01-11 RX ADMIN — SODIUM CHLORIDE, PRESERVATIVE FREE 10 ML: 5 INJECTION INTRAVENOUS at 15:26

## 2023-01-11 RX ADMIN — DEXAMETHASONE SODIUM PHOSPHATE 8 MG: 4 INJECTION, SOLUTION INTRA-ARTICULAR; INTRALESIONAL; INTRAMUSCULAR; INTRAVENOUS; SOFT TISSUE at 22:29

## 2023-01-11 RX ADMIN — GABAPENTIN 400 MG: 400 CAPSULE ORAL at 22:30

## 2023-01-11 RX ADMIN — SODIUM CHLORIDE, PRESERVATIVE FREE 10 ML: 5 INJECTION INTRAVENOUS at 06:47

## 2023-01-11 RX ADMIN — GABAPENTIN 400 MG: 400 CAPSULE ORAL at 10:03

## 2023-01-11 RX ADMIN — SODIUM CHLORIDE, PRESERVATIVE FREE 10 ML: 5 INJECTION INTRAVENOUS at 22:30

## 2023-01-11 RX ADMIN — DEXAMETHASONE SODIUM PHOSPHATE 8 MG: 4 INJECTION, SOLUTION INTRA-ARTICULAR; INTRALESIONAL; INTRAMUSCULAR; INTRAVENOUS; SOFT TISSUE at 06:15

## 2023-01-11 NOTE — PROGRESS NOTES
Spiritual Care Assessment/Progress Note  [facility]      NAME: Gary Cristina      MRN: [id number]  AGE: 78 y.o.  SEX: female  Evangelical Affiliation: No preference   Language: English     1/11/2023     Total Time (in minutes): 25     Spiritual Assessment begun in [facility] through conversation with:         [x]Patient        [] Family    [] Friend(s)        Reason for Consult: Initial/Spiritual assessment, patient floor     Spiritual beliefs: (Please include comment if needed)     [] Identifies with a geoff tradition:         [] Supported by a geoff community:            [] Claims no spiritual orientation:           [] Seeking spiritual identity:                [x] Adheres to an individual form of spirituality:           [] Not able to assess:                           Identified resources for coping:      [] Prayer                               [] Music                  [] Guided Imagery     [x] Family/friends                 [] Pet visits     [] Devotional reading                         [] Unknown     [] Other:                                               Interventions offered during this visit: (See comments for more details)    Patient Interventions: Affirmation of emotions/emotional suffering, Normalization of emotional/spiritual concerns           Plan of Care:     [] Support spiritual and/or cultural needs    [] Support AMD and/or advance care planning process      [] Support grieving process   [] Coordinate Rites and/or Rituals    [] Coordination with community clergy   [x] No spiritual needs identified at this time   [] Detailed Plan of Care below (See Comments)  [] Make referral to Music Therapy  [] Make referral to Pet Therapy     [] Make referral to Addiction services  [] Make referral to [facility]  [] Make referral to Spiritual Care Partner  [x] No future visits requested        [] Contact Spiritual Care for further referrals     Comments: Patient visited by  intern Jacinto Forde Armani for initial spiritual assessment. Patient shared she's a non-Yazdanism but believes in spirituality. Patient shared about her family, four children, and became tearful when stated two had . She shared the history of medical concerns for reason of being in hospital.  affirmed patient's spiritual awareness by listening attentively. Validated feelings of pain and suffering with words of encouragement. No further visits requested. Alda Price M.Div.    Intern

## 2023-01-11 NOTE — PROGRESS NOTES
Hospitalist Progress Note         Mohinder Ring          Daily Progress Note: 1/11/2023      Subjective: The patient is seen for follow  up. Gely Otero 78 y.o. female history of carcinoma of the lung with lobectomy in 1996, since then on follow-up with surveillance. She woke up this morning with severe lower extremity weakness and pain unable to stand up and walk. 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 She is recommended to follow-up with oncology, Dr. Chilo Nascimento, had MRI and PET scan scheduled January 2023. States the back pain started getting worse, its severe, given oxycodone 10 mg 3 times a day but not helping the pain. Its radiating into the legs, now with severe weakness. She is crying with pain worse with any movement. Due to the mass in the lumbar spine requested for CT of thoracic spine and lumbar spine, consult placed to radiation oncology. As she is not completely paralyzed, radiation oncology recommended to continue steroids at this time and follow-up. Dr. Declan Keller, rad/onc, to start radiation treatment on 01/03. Oncology consult. Continue pain management inpatient for now. Family interested in pursuing inpatient rehab post radiation therapy.   ----  Patient seen for follow-up. Patient is alert and oriented, in no apparent distress. Patient continues to have back pain that shoots down her legs bilaterally, especially when she is laying on her tailbone. Current pain rating Is 7/10, reports increasing to 8 or 9 at times. Noticed right leg is more swollen than left, swelling happens intermittently. Any movement of the LE causes pain. Patient is able to dorsiflex and plantarflex by herself.      Problem List:  Problem List as of 1/11/2023 Date Reviewed: 12/30/2022            Codes Class Noted - Resolved    Acute back pain ICD-10-CM: M54.9  ICD-9-CM: 724.5  12/30/2022 - Present        Lower extremity weakness ICD-10-CM: R29.898  ICD-9-CM: 729.89  12/30/2022 - Present        Gait disturbance ICD-10-CM: R26.9  ICD-9-CM: 781.2  12/30/2022 - Present        Bronchitis ICD-10-CM: J40  ICD-9-CM: 872  11/16/2021 - Present        Sepsis ([facility] ICD-10-CM: A41.9  ICD-9-CM: 038.9, 995.91  11/13/2021 - Present        Acute and chronic respiratory failure with hypoxia ([facility]) ICD-10-CM: J96.21  ICD-9-CM: 518.84, 799.02  11/13/2021 - Present        Lung cancer ([facility] ICD-10-CM: C34.90  ICD-9-CM: 162.9  4/29/2014 - Present        Dyslipidemia ICD-10-CM: E78.5  ICD-9-CM: 272.4  4/29/2014 - Present       Medications reviewed  Current Facility-Administered Medications   Medication Dose Route Frequency    docusate sodium (COLACE) capsule 100 mg  100 mg Oral BID PRN    oxyCODONE IR (ROXICODONE) tablet 10 mg  10 mg Oral Q4H PRN    fentaNYL (DURAGESIC) 25 mcg/hr patch 2 Patch  2 Patch TransDERmal Q72H    ALPRAZolam (XANAX) tablet 0.25 mg  0.25 mg Oral TID PRN    docusate sodium (COLACE) capsule 100 mg  100 mg Oral DAILY    cyclobenzaprine (FLEXERIL) tablet 10 mg  10 mg Oral TID PRN    polyethylene glycol (MIRALAX) packet 17 g  17 g Oral QHS    hydrOXYzine pamoate (VISTARIL) capsule 25 mg  25 mg Oral TID PRN    diphenhydrAMINE (BENADRYL) capsule 25 mg  25 mg Oral Q6H PRN    albuterol-ipratropium (DUO-NEB) 2.5 MG-0.5 MG/3 ML  3 mL Nebulization Q4H PRN    budesonide-formoteroL (SYMBICORT) 160-4.5 mcg/actuation HFA inhaler 1 Puff  1 Puff Inhalation BID RT    And    tiotropium bromide (SPIRIVA RESPIMAT) 2.5 mcg /actuation  2 Puff Inhalation DAILY    albuterol (PROVENTIL HFA, VENTOLIN HFA, PROAIR HFA) inhaler 2 Puff  2 Puff Inhalation S6V PRN    folic acid (FOLVITE) tablet 1 mg  1 mg Oral ACB    gabapentin (NEURONTIN) capsule 400 mg  400 mg Oral TID    pantoprazole (PROTONIX) tablet 40 mg  40 mg Oral ACB    sodium chloride (NS) flush 5-40 mL  5-40 mL IntraVENous Q8H    sodium chloride (NS) flush 5-40 mL  5-40 mL IntraVENous PRN    acetaminophen (TYLENOL) tablet 650 mg  650 mg Oral Q6H PRN    Or    acetaminophen (TYLENOL) suppository 650 mg  650 mg Rectal Q6H PRN    ondansetron (ZOFRAN ODT) tablet 4 mg  4 mg Oral Q6H PRN    Or    ondansetron (ZOFRAN) injection 4 mg  4 mg IntraVENous Q6H PRN    dexamethasone (DECADRON) 4 mg/mL injection 8 mg  8 mg IntraVENous Q8H       Review of Systems:   A comprehensive review of systems was negative except for that written in the HPI. Objective:   Physical Exam:     Visit Vitals  /60 (BP 1 Location: Left upper arm, BP Patient Position: At rest)   Pulse 60   Temp 97.7 °F (36.5 °C)   Resp 14   Ht 5' 5.98\" (1.676 m)   Wt 180 lb (81.6 kg)   SpO2 97%   BMI 29.07 kg/m²    O2 Flow Rate (L/min): 2 l/min O2 Device: Nasal cannula    Temp (24hrs), Av °F (36.7 °C), Min:97.7 °F (36.5 °C), Max:98.5 °F (36.9 °C)    No intake/output data recorded.  1901 -  0700  In: 120 [P.O.:120]  Out: 200 [Urine:200]    General:  Alert, cooperative, no distress, appears stated age. Lungs:   Clear to auscultation bilaterally. Chest wall:  No tenderness or deformity. Heart:  Regular rate and rhythm, S1, S2 normal, no murmur, click, rub or gallop. Abdomen:   Soft, non-tender. Bowel sounds normal. No masses,  No organomegaly. Extremities: Extremities normal, atraumatic, no cyanosis or edema. Pulses: 2+ and symmetric all extremities. Skin: Skin color, texture, turgor normal. No rashes or lesions   Neurologic: CNII-XII intact. No gross sensory or motor deficits     Data Review:       Recent Days:  No results for input(s): WBC, HGB, HCT, PLT, HGBEXT, HCTEXT, PLTEXT, HGBEXT, HCTEXT, PLTEXT in the last 72 hours. No results for input(s): NA, K, CL, CO2, GLU, BUN, CREA, CA, MG, PHOS, ALB, TBIL, TBILI, ALT, INR, INREXT, INREXT in the last 72 hours.     No lab exists for component: SGOT    No results for input(s): PH, PCO2, PO2, HCO3, FIO2 in the last 72 hours. 24 Hour Results:  No results found for this or any previous visit (from the past 24 hour(s)). Assessment/      Lumbar Mass  at L3   Most likely due to recurrence of lung adenocarcinoma   Radiation Therapy started   Continue pain management   Recommended to continue steroids at this time     2. Severe Back Pain   Continue current pain medications, currently on oxycodone and IV morphine  Fentanyl? Xanax? 3. Gait disturbance and lower extremity weakness  Most likely due to intradural cord compression   Continue IV dexamethasone as per radiation oncology     4.  COPD   Continue duo-neb therapy as needed  No signs of exacerbation     Continue supportive care   Discharge to inpatient rehab pending patient completes all 10 sessions of radiation therapy    Mohinder Ring

## 2023-01-11 NOTE — PROGRESS NOTES
Problem: Falls - Risk of  Goal: *Absence of Falls  Description: Document Cody Yan Fall Risk and appropriate interventions in the flowsheet.   Outcome: Progressing Towards Goal  Note: Fall Risk Interventions:  Mobility Interventions: Bed/chair exit alarm, Patient to call before getting OOB         Medication Interventions: Patient to call before getting OOB, Bed/chair exit alarm    Elimination Interventions: Bed/chair exit alarm, Call light in reach              Problem: Patient Education: Go to Patient Education Activity  Goal: Patient/Family Education  Outcome: Progressing Towards Goal     Problem: Pain  Goal: *Control of Pain  Outcome: Progressing Towards Goal

## 2023-01-11 NOTE — PROGRESS NOTES
CM reviewed chart. Patient is still planning to finish radiation on Friday and then go to [facility]. CM spoke with [facility] and patient's daughter to confirm this plan will still work. All parties are in agreement and patient can discharge to [facility] when medically clear. When CM spoke with daughter she stated that Friday she is supposed to be getting final results on whether cancer has spread. She stated that if cancer has spread then she would want to take patient home on hospice with [facility]. CM explained that [facility] is being updated daily as well. CM explained that CM will follow up with daughter again Friday morning. If daughter needs anything before them, CM provided number.

## 2023-01-11 NOTE — PROGRESS NOTES
Hospitalist Progress Note         Kaylan Parker MD          Daily Progress Note: 1/11/2023      Subjective: The patient is seen for follow  up. Gely Otero 78 y.o. female history of carcinoma of the lung with lobectomy in 1996, since then on follow-up with surveillance. She woke up this morning with severe lower extremity weakness and pain unable to stand up and walk. 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 She is recommended to follow-up with oncology, Dr. Mary Ann Rahman, had MRI and PET scan scheduled January 2023. States the back pain started getting worse, its severe, given oxycodone 10 mg 3 times a day but not helping the pain. Its radiating into the legs, now with severe weakness. She is crying with pain worse with any movement. Due to the mass in the lumbar spine requested for CT of thoracic spine and lumbar spine, consult placed to radiation oncology. As she is not completely paralyzed, radiation oncology recommended to continue steroids at this time and follow-up. Dr. Annmarie Heimlich, rad/onc, to start radiation treatment on 01/03. Oncology consult. Continue pain management inpatient for now.   Family interested in pursuing inpatient rehab post radiation therapy    Problem List:  Problem List as of 1/11/2023 Date Reviewed: 12/30/2022            Codes Class Noted - Resolved    Acute back pain ICD-10-CM: M54.9  ICD-9-CM: 724.5  12/30/2022 - Present        Lower extremity weakness ICD-10-CM: R29.898  ICD-9-CM: 729.89  12/30/2022 - Present        Gait disturbance ICD-10-CM: R26.9  ICD-9-CM: 781.2  12/30/2022 - Present        Bronchitis ICD-10-CM: J40  ICD-9-CM: 490  11/16/2021 - Present        Sepsis ([facility] [address] ICD-10-CM: A41.9  ICD-9-CM: 038.9, 995.91  11/13/2021 - Present        Acute and chronic respiratory failure with hypoxia ([facility] [address] ICD-10-CM: J96.21  ICD-9-CM: 518.84, 799.02  11/13/2021 - Present        Lung cancer [facility]) ICD-10-CM: C34.90  ICD-9-CM: 162.9  4/29/2014 - Present        Dyslipidemia ICD-10-CM: E78.5  ICD-9-CM: 272.4  4/29/2014 - Present       Medications reviewed  Current Facility-Administered Medications   Medication Dose Route Frequency    docusate sodium (COLACE) capsule 100 mg  100 mg Oral BID PRN    oxyCODONE IR (ROXICODONE) tablet 10 mg  10 mg Oral Q4H PRN    fentaNYL (DURAGESIC) 25 mcg/hr patch 2 Patch  2 Patch TransDERmal Q72H    ALPRAZolam (XANAX) tablet 0.25 mg  0.25 mg Oral TID PRN    docusate sodium (COLACE) capsule 100 mg  100 mg Oral DAILY    cyclobenzaprine (FLEXERIL) tablet 10 mg  10 mg Oral TID PRN    polyethylene glycol (MIRALAX) packet 17 g  17 g Oral QHS    hydrOXYzine pamoate (VISTARIL) capsule 25 mg  25 mg Oral TID PRN    diphenhydrAMINE (BENADRYL) capsule 25 mg  25 mg Oral Q6H PRN    albuterol-ipratropium (DUO-NEB) 2.5 MG-0.5 MG/3 ML  3 mL Nebulization Q4H PRN    budesonide-formoteroL (SYMBICORT) 160-4.5 mcg/actuation HFA inhaler 1 Puff  1 Puff Inhalation BID RT    And    tiotropium bromide (SPIRIVA RESPIMAT) 2.5 mcg /actuation  2 Puff Inhalation DAILY    albuterol (PROVENTIL HFA, VENTOLIN HFA, PROAIR HFA) inhaler 2 Puff  2 Puff Inhalation X6B PRN    folic acid (FOLVITE) tablet 1 mg  1 mg Oral ACB    gabapentin (NEURONTIN) capsule 400 mg  400 mg Oral TID    pantoprazole (PROTONIX) tablet 40 mg  40 mg Oral ACB    sodium chloride (NS) flush 5-40 mL  5-40 mL IntraVENous Q8H    sodium chloride (NS) flush 5-40 mL  5-40 mL IntraVENous PRN    acetaminophen (TYLENOL) tablet 650 mg  650 mg Oral Q6H PRN    Or    acetaminophen (TYLENOL) suppository 650 mg  650 mg Rectal Q6H PRN    ondansetron (ZOFRAN ODT) tablet 4 mg  4 mg Oral Q6H PRN    Or    ondansetron (ZOFRAN) injection 4 mg  4 mg IntraVENous Q6H PRN    dexamethasone (DECADRON) 4 mg/mL injection 8 mg  8 mg IntraVENous Q8H       Review of Systems:   A comprehensive review of systems was negative except for that written in the HPI. Objective:   Physical Exam:     Visit Vitals  /60 (BP 1 Location: Left upper arm, BP Patient Position: At rest)   Pulse 67   Temp 98.8 °F (37.1 °C)   Resp 16   Ht 5' 5.98\" (1.676 m)   Wt 81.6 kg (180 lb)   SpO2 97%   BMI 29.07 kg/m²    O2 Flow Rate (L/min): 2 l/min O2 Device: Nasal cannula    Temp (24hrs), Av.2 °F (36.8 °C), Min:97.7 °F (36.5 °C), Max:98.8 °F (37.1 °C)    No intake/output data recorded.  1901 -  0700  In: 120 [P.O.:120]  Out: 200 [Urine:200]    General:  Alert, cooperative, no distress, appears stated age. Lungs:   Clear to auscultation bilaterally. Chest wall:  No tenderness or deformity. Heart:  Regular rate and rhythm, S1, S2 normal, no murmur, click, rub or gallop. Abdomen:   Soft, non-tender. Bowel sounds normal. No masses,  No organomegaly. Extremities: Extremities normal, atraumatic, no cyanosis or edema. Pulses: 2+ and symmetric all extremities. Skin: Skin color, texture, turgor normal. No rashes or lesions   Neurologic: CNII-XII intact. No gross sensory or motor deficits     Data Review:       Recent Days:  No results for input(s): WBC, HGB, HCT, PLT, HGBEXT, HCTEXT, PLTEXT, HGBEXT, HCTEXT, PLTEXT in the last 72 hours. No results for input(s): NA, K, CL, CO2, GLU, BUN, CREA, CA, MG, PHOS, ALB, TBIL, TBILI, ALT, INR, INREXT, INREXT in the last 72 hours. No lab exists for component: SGOT    No results for input(s): PH, PCO2, PO2, HCO3, FIO2 in the last 72 hours. 24 Hour Results:  No results found for this or any previous visit (from the past 24 hour(s)). Assessment/     1. Gait disturbance with lower extremity weakness  - D/t cord compression and tumor  - Continue IV dexamethasone as per radiation oncology recommendations, 10 mg of 1 dose now then 8 mg every 8 hours and monitor closely.     2. Severe back pain secondary to suspected spinal mass  - Increased the dose of the oxycodone to long-acting OxyContin 20 mg twice a day with IV morphine breakthrough pain relief. - Mass at L3 that was clearly present 2 weeks ago on MRI and appears to have progressed causing increased pain and possibly lower extremity weakness.  - Discussed with Dr. Lianne Kahtleen; per Spine surgeons --> very high complication risk.   - Radiation oncology consulted --> XRT started; 10 total (30 Gy in 10 fractions per Rad Onc starting on 01/03/2023)  - Add fentanyl --> increase to 50mcg; change other meds to PRN  - Add xanax prn    3. 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 COPD  - Mild with no signs of exacerbation on DuoNeb which we will continue      Plan:  Continue supportive care   Continue pain control and increase frequency of oral pain medication  Docusate 100 mg twice daily for constipation  Anticipate discharge to inpatient rehab over the weekend    Care Plan discussed with: Patient/Family    Total time spent with patient: 30 minutes.     Farideh Osullivan MD

## 2023-01-12 ENCOUNTER — HOSPITAL ENCOUNTER (INPATIENT)
Dept: RADIATION THERAPY | Age: 83
Discharge: HOME | DRG: 055 | End: 2023-01-12
Payer: MEDICARE

## 2023-01-12 PROCEDURE — 77010033678 HC OXYGEN DAILY

## 2023-01-12 PROCEDURE — 74011250637 HC RX REV CODE- 250/637: Performed by: HOSPITALIST

## 2023-01-12 PROCEDURE — 94640 AIRWAY INHALATION TREATMENT: CPT

## 2023-01-12 PROCEDURE — 74011000250 HC RX REV CODE- 250: Performed by: HOSPITALIST

## 2023-01-12 PROCEDURE — 74011250636 HC RX REV CODE- 250/636: Performed by: HOSPITALIST

## 2023-01-12 PROCEDURE — 94761 N-INVAS EAR/PLS OXIMETRY MLT: CPT

## 2023-01-12 PROCEDURE — 77412 RADIATION TX DELIVERY LVL 3: CPT

## 2023-01-12 PROCEDURE — 74011250637 HC RX REV CODE- 250/637: Performed by: INTERNAL MEDICINE

## 2023-01-12 PROCEDURE — 74011250637 HC RX REV CODE- 250/637: Performed by: STUDENT IN AN ORGANIZED HEALTH CARE EDUCATION/TRAINING PROGRAM

## 2023-01-12 PROCEDURE — 65270000029 HC RM PRIVATE

## 2023-01-12 RX ADMIN — BUDESONIDE AND FORMOTEROL FUMARATE DIHYDRATE 1 PUFF: 160; 4.5 AEROSOL RESPIRATORY (INHALATION) at 08:56

## 2023-01-12 RX ADMIN — GABAPENTIN 400 MG: 400 CAPSULE ORAL at 23:26

## 2023-01-12 RX ADMIN — DEXAMETHASONE SODIUM PHOSPHATE 8 MG: 4 INJECTION, SOLUTION INTRA-ARTICULAR; INTRALESIONAL; INTRAMUSCULAR; INTRAVENOUS; SOFT TISSUE at 06:49

## 2023-01-12 RX ADMIN — CYCLOBENZAPRINE 10 MG: 10 TABLET, FILM COATED ORAL at 08:42

## 2023-01-12 RX ADMIN — DEXAMETHASONE SODIUM PHOSPHATE 8 MG: 4 INJECTION, SOLUTION INTRA-ARTICULAR; INTRALESIONAL; INTRAMUSCULAR; INTRAVENOUS; SOFT TISSUE at 23:26

## 2023-01-12 RX ADMIN — GABAPENTIN 400 MG: 400 CAPSULE ORAL at 08:42

## 2023-01-12 RX ADMIN — PANTOPRAZOLE SODIUM 40 MG: 40 TABLET, DELAYED RELEASE ORAL at 06:49

## 2023-01-12 RX ADMIN — SODIUM CHLORIDE, PRESERVATIVE FREE 10 ML: 5 INJECTION INTRAVENOUS at 15:48

## 2023-01-12 RX ADMIN — FOLIC ACID 1 MG: 1 TABLET ORAL at 06:49

## 2023-01-12 RX ADMIN — SODIUM CHLORIDE, PRESERVATIVE FREE 10 ML: 5 INJECTION INTRAVENOUS at 06:50

## 2023-01-12 RX ADMIN — GABAPENTIN 400 MG: 400 CAPSULE ORAL at 15:47

## 2023-01-12 RX ADMIN — OXYCODONE HYDROCHLORIDE 10 MG: 5 TABLET ORAL at 08:42

## 2023-01-12 RX ADMIN — TIOTROPIUM BROMIDE INHALATION SPRAY 2 PUFF: 3.12 SPRAY, METERED RESPIRATORY (INHALATION) at 08:56

## 2023-01-12 RX ADMIN — BUDESONIDE AND FORMOTEROL FUMARATE DIHYDRATE 1 PUFF: 160; 4.5 AEROSOL RESPIRATORY (INHALATION) at 22:11

## 2023-01-12 RX ADMIN — SODIUM CHLORIDE, PRESERVATIVE FREE 10 ML: 5 INJECTION INTRAVENOUS at 23:32

## 2023-01-12 RX ADMIN — OXYCODONE HYDROCHLORIDE 10 MG: 5 TABLET ORAL at 15:47

## 2023-01-12 RX ADMIN — DEXAMETHASONE SODIUM PHOSPHATE 8 MG: 4 INJECTION, SOLUTION INTRA-ARTICULAR; INTRALESIONAL; INTRAMUSCULAR; INTRAVENOUS; SOFT TISSUE at 15:47

## 2023-01-12 RX ADMIN — DOCUSATE SODIUM 100 MG: 100 CAPSULE, LIQUID FILLED ORAL at 08:42

## 2023-01-12 NOTE — PROGRESS NOTES
CM spoke with [facility].  They are still able to accept patient whenever her radiation therapy is complete. Primary physician reported patient should finish radiation tomorrow and be able to discharge. CM will follow up again in the morning.

## 2023-01-12 NOTE — PROGRESS NOTES
Hospitalist Progress Note         Mohinder Ring          Daily Progress Note: 1/12/2023      Subjective: The patient is seen for follow  up. Gely Otero 78 y.o. female history of carcinoma of the lung with lobectomy in 1996, since then on follow-up with surveillance. She woke up this morning with severe lower extremity weakness and pain unable to stand up and walk. 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 She is recommended to follow-up with oncology, Dr. Yosi Paul, had MRI and PET scan scheduled January 2023. States the back pain started getting worse, its severe, given oxycodone 10 mg 3 times a day but not helping the pain. Its radiating into the legs, now with severe weakness. She is crying with pain worse with any movement. Due to the mass in the lumbar spine requested for CT of thoracic spine and lumbar spine, consult placed to radiation oncology. As she is not completely paralyzed, radiation oncology recommended to continue steroids at this time and follow-up. Dr. Nereyda Jung, rad/onc, to start radiation treatment on 01/03. Oncology consult. Continue pain management inpatient for now. Family interested in pursuing inpatient rehab post radiation therapy.   ----  Patient seen for follow-up. Patient is alert and oriented, in no apparent distress. Patient continues to have back pain that shoots down her legs bilaterally, especially when she is laying on her tailbone. Current pain rating Is 8/10. Noticed right leg is more swollen than left, swelling happens intermittently. Patient was able to lift her left leg all the way up in supine position, struggling to do the same with right leg. She is tolerating medications well.      No new labs or imaging     Problem List:  Problem List as of 1/12/2023 Date Reviewed: 12/30/2022            Codes Class Noted - Resolved    Acute back pain ICD-10-CM: M54.9  ICD-9-CM: 724.5  12/30/2022 - Present        Lower extremity weakness ICD-10-CM: R29.898  ICD-9-CM: 729.89  12/30/2022 - Present        Gait disturbance ICD-10-CM: R26.9  ICD-9-CM: 781.2  12/30/2022 - Present        Bronchitis ICD-10-CM: J40  ICD-9-CM: 746  11/16/2021 - Present        Sepsis ([facility] ICD-10-CM: A41.9  ICD-9-CM: 038.9, 995.91  11/13/2021 - Present        Acute and chronic respiratory failure with hypoxia ([facility]) ICD-10-CM: J96.21  ICD-9-CM: 518.84, 799.02  11/13/2021 - Present        Lung cancer ([facility] ICD-10-CM: C34.90  ICD-9-CM: 162.9  4/29/2014 - Present        Dyslipidemia ICD-10-CM: E78.5  ICD-9-CM: 272.4  4/29/2014 - Present       Medications reviewed  Current Facility-Administered Medications   Medication Dose Route Frequency    docusate sodium (COLACE) capsule 100 mg  100 mg Oral BID PRN    oxyCODONE IR (ROXICODONE) tablet 10 mg  10 mg Oral Q4H PRN    fentaNYL (DURAGESIC) 25 mcg/hr patch 2 Patch  2 Patch TransDERmal Q72H    ALPRAZolam (XANAX) tablet 0.25 mg  0.25 mg Oral TID PRN    docusate sodium (COLACE) capsule 100 mg  100 mg Oral DAILY    cyclobenzaprine (FLEXERIL) tablet 10 mg  10 mg Oral TID PRN    polyethylene glycol (MIRALAX) packet 17 g  17 g Oral QHS    hydrOXYzine pamoate (VISTARIL) capsule 25 mg  25 mg Oral TID PRN    diphenhydrAMINE (BENADRYL) capsule 25 mg  25 mg Oral Q6H PRN    albuterol-ipratropium (DUO-NEB) 2.5 MG-0.5 MG/3 ML  3 mL Nebulization Q4H PRN    budesonide-formoteroL (SYMBICORT) 160-4.5 mcg/actuation HFA inhaler 1 Puff  1 Puff Inhalation BID RT    And    tiotropium bromide (SPIRIVA RESPIMAT) 2.5 mcg /actuation  2 Puff Inhalation DAILY    albuterol (PROVENTIL HFA, VENTOLIN HFA, PROAIR HFA) inhaler 2 Puff  2 Puff Inhalation T6G PRN    folic acid (FOLVITE) tablet 1 mg  1 mg Oral ACB    gabapentin (NEURONTIN) capsule 400 mg  400 mg Oral TID    pantoprazole (PROTONIX) tablet 40 mg  40 mg Oral ACB    sodium chloride (NS) flush 5-40 mL  5-40 mL IntraVENous Q8H    sodium chloride (NS) flush 5-40 mL  5-40 mL IntraVENous PRN    acetaminophen (TYLENOL) tablet 650 mg  650 mg Oral Q6H PRN    Or    acetaminophen (TYLENOL) suppository 650 mg  650 mg Rectal Q6H PRN    ondansetron (ZOFRAN ODT) tablet 4 mg  4 mg Oral Q6H PRN    Or    ondansetron (ZOFRAN) injection 4 mg  4 mg IntraVENous Q6H PRN    dexamethasone (DECADRON) 4 mg/mL injection 8 mg  8 mg IntraVENous Q8H       Review of Systems:   A comprehensive review of systems was negative except for that written in the HPI. Objective:   Physical Exam:     Visit Vitals  BP (!) 122/57 (BP 1 Location: Left upper arm, BP Patient Position: At rest)   Pulse 61   Temp 97.3 °F (36.3 °C)   Resp 18   Ht 5' 5.98\" (1.676 m)   Wt 180 lb (81.6 kg)   SpO2 95%   BMI 29.07 kg/m²    O2 Flow Rate (L/min): 2 l/min O2 Device: Nasal cannula    Temp (24hrs), Av.1 °F (36.7 °C), Min:97.3 °F (36.3 °C), Max:98.8 °F (37.1 °C)    No intake/output data recorded. 01/10 1901 -  0700  In: 120 [P.O.:120]  Out: 350 [Urine:350]    General:  Alert, cooperative, no distress, appears stated age. Lungs:   Clear to auscultation bilaterally. Chest wall:  No tenderness or deformity. Heart:  Regular rate and rhythm, S1, S2 normal, no murmur, click, rub or gallop. Abdomen:   Soft, non-tender. Bowel sounds normal. No masses,  No organomegaly. Extremities: Extremities normal, atraumatic, no cyanosis or edema. Pulses: 2+ and symmetric all extremities. Skin: Skin color, texture, turgor normal. No rashes or lesions   Neurologic: CNII-XII intact. No gross sensory or motor deficits     Data Review:       Recent Days:  No results for input(s): WBC, HGB, HCT, PLT, HGBEXT, HCTEXT, PLTEXT, HGBEXT, HCTEXT, PLTEXT in the last 72 hours. No results for input(s): NA, K, CL, CO2, GLU, BUN, CREA, CA, MG, PHOS, ALB, TBIL, TBILI, ALT, INR, INREXT, INREXT in the last 72 hours.     No lab exists for component: SGOT    No results for input(s): PH, PCO2, PO2, HCO3, FIO2 in the last 72 hours. 24 Hour Results:  No results found for this or any previous visit (from the past 24 hour(s)). Assessment/      Lumbar Mass  at L3   Most likely due to recurrence of lung adenocarcinoma   Radiation Therapy started, completed 7th treatment yesterday   Continue pain management   Recommended to continue steroids at this time     2. Severe Back Pain   Continue current pain medications, currently on oxycodone and IV morphine  Fentanyl? Xanax? 3. Gait disturbance and lower extremity weakness  Most likely due to intradural cord compression   Continue IV dexamethasone as per radiation oncology     4. COPD   Continue duo-neb therapy as needed  No signs of exacerbation     Continue supportive care   Patient and patient's daughter awaiting results regarding cancer metastasis, would like to be discharged home on hospice with [facility] if cancer has spread.    Discharge pending patient completes all 10 sessions of radiation therapy      Mohinder Ring

## 2023-01-12 NOTE — PROGRESS NOTES
Problem: Falls - Risk of  Goal: *Absence of Falls  Description: Document Elsa Alejandro Fall Risk and appropriate interventions in the flowsheet.   Outcome: Progressing Towards Goal  Note: Fall Risk Interventions:  Mobility Interventions: Bed/chair exit alarm, Patient to call before getting OOB         Medication Interventions: Patient to call before getting OOB, Bed/chair exit alarm    Elimination Interventions: Bed/chair exit alarm, Call light in reach              Problem: Patient Education: Go to Patient Education Activity  Goal: Patient/Family Education  Outcome: Progressing Towards Goal     Problem: Pain  Goal: *Control of Pain  Outcome: Progressing Towards Goal     Problem: Patient Education: Go to Patient Education Activity  Goal: Patient/Family Education  Outcome: Progressing Towards Goal     Problem: General Medical Care Plan  Goal: *Vital signs within specified parameters  Outcome: Progressing Towards Goal  Goal: *Labs within defined limits  Outcome: Progressing Towards Goal  Goal: *Absence of infection signs and symptoms  Outcome: Progressing Towards Goal  Goal: *Optimal pain control at patient's stated goal  Outcome: Progressing Towards Goal  Goal: *Skin integrity maintained  Outcome: Progressing Towards Goal  Goal: *Fluid volume balance  Outcome: Progressing Towards Goal  Goal: *Optimize nutritional status  Outcome: Progressing Towards Goal  Goal: *Anxiety reduced or absent  Outcome: Progressing Towards Goal  Goal: *Progressive mobility and function (eg: ADL's)  Outcome: Progressing Towards Goal     Problem: Patient Education: Go to Patient Education Activity  Goal: Patient/Family Education  Outcome: Progressing Towards Goal     Problem: Patient Education: Go to Patient Education Activity  Goal: Patient/Family Education  Outcome: Progressing Towards Goal     Problem: Pressure Injury - Risk of  Goal: *Prevention of pressure injury  Description: Document Al Scale and appropriate interventions in the flowsheet.   Outcome: Progressing Towards Goal     Problem: Patient Education: Go to Patient Education Activity  Goal: Patient/Family Education  Outcome: Progressing Towards Goal     Problem: Patient Education: Go to Patient Education Activity  Goal: Patient/Family Education  Outcome: Progressing Towards Goal     Problem: Patient Education: Go to Patient Education Activity  Goal: Patient/Family Education  Outcome: Progressing Towards Goal

## 2023-01-12 NOTE — Clinical Note
History and Physical    NAME: Gi Ayala   :  1940   MRN:  [id number]     Date/Time:  2023 4:59 PM    Patient PCP: Danette Thakkar MD  ______________________________________________________________________             Subjective:     CHIEF COMPLAINT:         HISTORY OF PRESENT ILLNESS:       Patient is a 78y.o. year old female     Past Medical History:   Diagnosis Date    Arrhythmia     \"fast heart rate\" at times, takes Toprol to control    Arthritis     Asthma     Cancer ([facility] [address] 1996    RUL lung    Cancer ([facility] [address] 2001    left breast    Cancer ([facility] [address] 2014    right lung    COPD     Dyslipidemia 2014    GERD (gastroesophageal reflux disease)     Hyperlipidemia     Irregular heart beat ,early     SVT per patient-none since starting metoprolol early     Lung cancer ([facility] [address] 2014    Lung cancer, lower lobe ([facility] [address] 2013    T3Nx adenocarcinoma RLL    Other ill-defined conditions(799.89)     urinary urgency    Sleep apnea     Unspecified adverse effect of anesthesia     severe itching after lung surgery        Past Surgical History:   Procedure Laterality Date    HX APPENDECTOMY      HX BLADDER SUSPENSION  around     HX BREAST LUMPECTOMY      left w/nodes removed    HX CATARACT REMOVAL Bilateral     HX HYSTERECTOMY  1985    ANTON    HX ORTHOPAEDIC Right     bunion removal    HX OTHER SURGICAL  3/5/2013    RLL superior segmentectomy    HX OTHER SURGICAL  3/11/2013    Resection of RLL margins    HX UROLOGICAL      Bladder tuck    [address], ,     right lung (see \"other\")    LA THORACOSCOPY SURG LOBECTOMY      RUL lung cancer       Social History     Tobacco Use    Smoking status: Former     Packs/day: 1.00     Years: 60.00     Pack years: 60.00     Types: Cigarettes     Quit date: 3/4/2013     Years since quittin.8    Smokeless tobacco: Never   Substance Use Topics    Alcohol use:  No Family History   Problem Relation Age of Onset    Cancer Mother         colon    Cancer Father         prostate    Cancer Sister         breast    Cancer Other         breast    Ovarian Cancer Other     No Known Problems Brother     No Known Problems Maternal Grandmother     No Known Problems Maternal Grandfather     No Known Problems Paternal Grandmother     No Known Problems Paternal Grandfather        Allergies   Allergen Reactions    Contrast Agent [Iodine] Rash    Adhesive Tape-Silicones Itching and Contact Dermatitis        Prior to Admission medications    Medication Sig Start Date End Date Taking? Authorizing Provider   oxyCODONE IR (ROXICODONE) 10 mg tab immediate release tablet Take 10 mg by mouth three (3) times daily. 12/6/22  Yes Provider, Historical   gabapentin (NEURONTIN) 300 mg capsule Take 300 mg by mouth three (3) times daily. Take 2 capsules in the morning, take 1 pill lunch time, take 2 pills bedtime  Indications: neuropathic pain 11/4/22  Yes Provider, Historical   omeprazole (PRILOSEC) 20 mg capsule Take 40 mg by mouth Daily (before breakfast). 10/25/22  Yes Provider, Historical   Trelegy Ellipta 200-62.5-25 mcg inhaler Take 1 Puff by inhalation daily. 10/4/22  Yes Provider, Historical   albuterol-ipratropium (DUO-NEB) 2.5 mg-0.5 mg/3 ml nebu 3 mL by Nebulization route every four (4) hours as needed. Yes Provider, Historical   trimethoprim (TRIMPEX) 100 mg tablet Take 100 mg by mouth daily as needed. Indications: urinary tract infection prevention   Yes Provider, Historical   rosuvastatin (CRESTOR) 10 mg tablet Take 10 mg by mouth nightly. Yes Provider, Historical   folic acid (FOLVITE) 1 mg tablet Take  by mouth Daily (before breakfast). Yes Provider, Historical   metoprolol succinate (TOPROL-XL) 50 mg XL tablet Take 1 Tab by mouth daily. 5/12/14  Yes Noni Winters MD   iron polysaccharides (NIFEREX) 150 mg iron capsule Take 150 mg by mouth Daily (before breakfast).   Patient not taking: Reported on 12/30/2022    Provider, Historical   albuterol (PROVENTIL HFA, VENTOLIN HFA, PROAIR HFA) 90 mcg/actuation inhaler Take 2 Puffs by inhalation every six (6) hours as needed for Wheezing or Shortness of Breath.     Provider, Historical         Current Facility-Administered Medications:     docusate sodium (COLACE) capsule 100 mg, 100 mg, Oral, BID PRN, Steven Trinh MD    oxyCODONE IR (ROXICODONE) tablet 10 mg, 10 mg, Oral, Q4H PRN, Steven Trinh MD, 10 mg at 01/12/23 1547    fentaNYL (DURAGESIC) 25 mcg/hr patch 2 Patch, 2 Patch, TransDERmal, Q72H, Tesfaye Moran MD, 2 Patch at 01/10/23 0950    ALPRAZolam (XANAX) tablet 0.25 mg, 0.25 mg, Oral, TID PRN, Tesfaye Moran MD    docusate sodium (COLACE) capsule 100 mg, 100 mg, Oral, DAILY, Dora Home, PA-C, 100 mg at 01/12/23 8808    cyclobenzaprine (FLEXERIL) tablet 10 mg, 10 mg, Oral, TID PRN, Dora Home, PA-C, 10 mg at 01/12/23 0842    polyethylene glycol (MIRALAX) packet 17 g, 17 g, Oral, QHS, Dora Home, PA-C, 17 g at 01/07/23 2149    hydrOXYzine pamoate (VISTARIL) capsule 25 mg, 25 mg, Oral, TID PRN, Dora Home, PA-C    diphenhydrAMINE (BENADRYL) capsule 25 mg, 25 mg, Oral, Q6H PRN, Dora Home, PA-C, 25 mg at 01/02/23 0158    albuterol-ipratropium (DUO-NEB) 2.5 MG-0.5 MG/3 ML, 3 mL, Nebulization, Q4H PRN, Lori Chen MD    budesonide-formoteroL (SYMBICORT) 160-4.5 mcg/actuation HFA inhaler 1 Puff, 1 Puff, Inhalation, BID RT, 1 Puff at 01/12/23 0856 **AND** tiotropium bromide (SPIRIVA RESPIMAT) 2.5 mcg /actuation, 2 Puff, Inhalation, DAILY, Star Mares MD, 2 Puff at 01/12/23 0856    albuterol (PROVENTIL HFA, VENTOLIN HFA, PROAIR HFA) inhaler 2 Puff, 2 Puff, Inhalation, Q6H PRN, Lori Chen MD    folic acid (FOLVITE) tablet 1 mg, 1 mg, Oral, ACB, Lori Chen MD, 1 mg at 01/12/23 0649    gabapentin (NEURONTIN) capsule 400 mg, 400 mg, Oral, TID, Tisha Alexandra Matilda Rick MD, 400 mg at 01/12/23 1547    pantoprazole (PROTONIX) tablet 40 mg, 40 mg, Oral, ACB, Casi Alan MD, 40 mg at 01/12/23 0649    sodium chloride (NS) flush 5-40 mL, 5-40 mL, IntraVENous, Q8H, Casi Alan MD, 10 mL at 01/12/23 1548    sodium chloride (NS) flush 5-40 mL, 5-40 mL, IntraVENous, PRN, Casi Alan MD    acetaminophen (TYLENOL) tablet 650 mg, 650 mg, Oral, Q6H PRN, 650 mg at 01/03/23 1647 **OR** acetaminophen (TYLENOL) suppository 650 mg, 650 mg, Rectal, Q6H PRN, Casi Alan MD    ondansetron (ZOFRAN ODT) tablet 4 mg, 4 mg, Oral, Q6H PRN **OR** ondansetron (ZOFRAN) injection 4 mg, 4 mg, IntraVENous, Q6H PRN, Casi Alan MD    dexamethasone (DECADRON) 4 mg/mL injection 8 mg, 8 mg, IntraVENous, Q8H, Casi Alan MD, 8 mg at 01/12/23 1547    LAB DATA REVIEWED:    No results found for this or any previous visit (from the past 24 hour(s)). XR Results (most recent):  Results from Hospital Encounter encounter on 11/13/21    XR CHEST SNGL V    Narrative  Chest one view. No comparison. Portable AP upright view at 1008 dated 11/13/2021. There has been a remote right thoracotomy. There is right hemithorax volume loss  and right apical pleural thickening/loculated pleural fluid. The heart is not enlarged. There is calcified plaque in the aorta. The left lung  is clear. There is no pneumothorax. There is a 6 mm linear radiodense structure overlying the left chest wall soft  tissues of uncertain significance. Impression  Postsurgical findings as above. CT CHEST ABD PELV WO CONT   Final Result      No evidence for recurrent or metastatic disease in the chest, abdomen, or   pelvis. Stable appearance of the bones. Please refer to above findings for   complete details. CT SPINE LUMB WO CONT   Final Result   1. No evidence of acute fracture.  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   2. 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 CT SPINE [facility] WO CONT   Final Result   1. No evidence of acute fracture. Review of Systems:  Constitutional: Negative for chills and fever. HENT: Negative. Eyes: Negative. Respiratory: Negative. Cardiovascular: Negative. Gastrointestinal: Negative for abdominal pain and nausea. Skin: Negative. Neurological: Negative. Objective:   VITALS:    Visit Vitals  /65 (BP 1 Location: Left upper arm, BP Patient Position: Supine)   Pulse 82   Temp 97.9 °F (36.6 °C)   Resp 15   Ht 5' 5.98\" (1.676 m)   Wt 180 lb (81.6 kg)   SpO2 97%   BMI 29.07 kg/m²       Physical Exam:   Constitutional: pt is oriented to person, place, and time. HENT:   Head: Normocephalic and atraumatic. Eyes: Pupils are equal, round, and reactive to light. EOM are normal.   Cardiovascular: Normal rate, regular rhythm and normal heart sounds. Pulmonary/Chest: Breath sounds normal. No wheezes. No rales. Exhibits no tenderness. Abdominal: Soft. Bowel sounds are normal. There is no abdominal tenderness. There is no rebound and no guarding. Musculoskeletal: Normal range of motion. Neurological: pt is alert and oriented to person, place, and time. Alert. Normal strength. No cranial nerve deficit or sensory deficit. Displays a negative Romberg sign.         ASSESSMENT:      PLAN:      ________________________________________________________________________    Perry Ha

## 2023-01-12 NOTE — PROGRESS NOTES
[facility]  [address]  [address]  [place] [address]  [phone]        Electromyography (EMG) Fact Sheet    Your physician has ordered a test for you called an Electromyography (EMG).  Your test has been scheduled with:     ______________________________________________________ .    Here are the answers to some commonly asked questions:  1.  What is an Electromyography (EMG)?   An Electromyography (EMG) is a recording of electrical activity in muscles.    2.  Why is an Electromyography (EMG) done?   An Electromyography (EMG) is done to help diagnose diseases of nerves and muscles.    3.  Who does the Electromyography (EMG?   Electromyographies (EMGs) are done by a neurologist.  Neurologists are physicians skilled in the diagnosis and treatment of disease of the nervous system.    4.  What happens when I arrive for my Electromyography (EMG)?   You will be greeted by an assistant who will escort you to the exam room.   The neurologist doing the procedure will explain the test to you in greater detail beforehand and answer any questions you might have.    5.  What happens during Electromyography (EMG)?   You will be asked to lay on an examining table for comfortable positioning.   A needle is inserted into a muscle during insertion, while the muscle is at rest, and while the muscle contracts.    6.  What happens after Electromyography(EMG)?   You may feel some tenderness in the tested muscles, which usually only lasts a few hours.  Normal activities may be resumed unless you are instructed otherwise.    7.  When will I know the results?   Test results are usually available within 24 hours.     Preparing for the Electromyography (EMG)  1.  You may eat your normal meals.  2.  Continue to take your medication as directed  3.  Bathe or shower on the morning of the exam, scrubbing your arms and legs well to remove any body oils.  4.  Do not use any bath oils, lotions, or creams the day of the  Hospitalist Progress Note         Maggy Bird MD          Daily Progress Note: 1/12/2023      Subjective: The patient is seen for follow  up. Gely Otero 78 y.o. female history of carcinoma of the lung with lobectomy in 1996, since then on follow-up with surveillance. She woke up this morning with severe lower extremity weakness and pain unable to stand up and walk. 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 She is recommended to follow-up with oncology, . Charanjit Caraballo, had MRI and PET scan scheduled January 2023. States the back pain started getting worse, its severe, given oxycodone 10 mg 3 times a day but not helping the pain. Its radiating into the legs, now with severe weakness. She is crying with pain worse with any movement. Due to the mass in the lumbar spine requested for CT of thoracic spine and lumbar spine, consult placed to radiation oncology. As she is not completely paralyzed, radiation oncology recommended to continue steroids at this time and follow-up. Dr. Farooq Ghotra, rad/onc, to start radiation treatment on 01/03. Oncology consult. Continue pain management inpatient for now.   Family interested in pursuing inpatient rehab post radiation therapy    Problem List:  Problem List as of 1/12/2023 Date Reviewed: 12/30/2022            Codes Class Noted - Resolved    Acute back pain ICD-10-CM: M54.9  ICD-9-CM: 724.5  12/30/2022 - Present        Lower extremity weakness ICD-10-CM: R29.898  ICD-9-CM: 729.89  12/30/2022 - Present        Gait disturbance ICD-10-CM: R26.9  ICD-9-CM: 781.2  12/30/2022 - Present        Bronchitis ICD-10-CM: J40  ICD-9-CM: 490  11/16/2021 - Present        Sepsis ([facility] [address] ICD-10-CM: A41.9  ICD-9-CM: 038.9, 995.91  11/13/2021 - Present        Acute and chronic respiratory failure with hypoxia ([facility] [address] ICD-10-CM: J96.21  ICD-9-CM: 518.84, 799.02  11/13/2021 - Present        Lung cancer [facility]) ICD-10-CM: C34.90  ICD-9-CM: 162.9  4/29/2014 - Present        Dyslipidemia ICD-10-CM: E78.5  ICD-9-CM: 272.4  4/29/2014 - Present       Medications reviewed  Current Facility-Administered Medications   Medication Dose Route Frequency    docusate sodium (COLACE) capsule 100 mg  100 mg Oral BID PRN    oxyCODONE IR (ROXICODONE) tablet 10 mg  10 mg Oral Q4H PRN    fentaNYL (DURAGESIC) 25 mcg/hr patch 2 Patch  2 Patch TransDERmal Q72H    ALPRAZolam (XANAX) tablet 0.25 mg  0.25 mg Oral TID PRN    docusate sodium (COLACE) capsule 100 mg  100 mg Oral DAILY    cyclobenzaprine (FLEXERIL) tablet 10 mg  10 mg Oral TID PRN    polyethylene glycol (MIRALAX) packet 17 g  17 g Oral QHS    hydrOXYzine pamoate (VISTARIL) capsule 25 mg  25 mg Oral TID PRN    diphenhydrAMINE (BENADRYL) capsule 25 mg  25 mg Oral Q6H PRN    albuterol-ipratropium (DUO-NEB) 2.5 MG-0.5 MG/3 ML  3 mL Nebulization Q4H PRN    budesonide-formoteroL (SYMBICORT) 160-4.5 mcg/actuation HFA inhaler 1 Puff  1 Puff Inhalation BID RT    And    tiotropium bromide (SPIRIVA RESPIMAT) 2.5 mcg /actuation  2 Puff Inhalation DAILY    albuterol (PROVENTIL HFA, VENTOLIN HFA, PROAIR HFA) inhaler 2 Puff  2 Puff Inhalation D2C PRN    folic acid (FOLVITE) tablet 1 mg  1 mg Oral ACB    gabapentin (NEURONTIN) capsule 400 mg  400 mg Oral TID    pantoprazole (PROTONIX) tablet 40 mg  40 mg Oral ACB    sodium chloride (NS) flush 5-40 mL  5-40 mL IntraVENous Q8H    sodium chloride (NS) flush 5-40 mL  5-40 mL IntraVENous PRN    acetaminophen (TYLENOL) tablet 650 mg  650 mg Oral Q6H PRN    Or    acetaminophen (TYLENOL) suppository 650 mg  650 mg Rectal Q6H PRN    ondansetron (ZOFRAN ODT) tablet 4 mg  4 mg Oral Q6H PRN    Or    ondansetron (ZOFRAN) injection 4 mg  4 mg IntraVENous Q6H PRN    dexamethasone (DECADRON) 4 mg/mL injection 8 mg  8 mg IntraVENous Q8H       Review of Systems:   A comprehensive review of exam.       systems was negative except for that written in the HPI. Objective:   Physical Exam:     Visit Vitals  BP (!) 111/55 (BP 1 Location: Left upper arm, BP Patient Position: Supine)   Pulse 69   Temp 98 °F (36.7 °C)   Resp 15   Ht 5' 5.98\" (1.676 m)   Wt 81.6 kg (180 lb)   SpO2 97%   BMI 29.07 kg/m²    O2 Flow Rate (L/min): 2 l/min O2 Device: Nasal cannula    Temp (24hrs), Av.9 °F (36.6 °C), Min:97.3 °F (36.3 °C), Max:98.6 °F (37 °C)    No intake/output data recorded. 01/10 1901 -  0700  In: 120 [P.O.:120]  Out: 700 [Urine:700]    General:  Alert, cooperative, no distress, appears stated age. Lungs:   Clear to auscultation bilaterally. Chest wall:  No tenderness or deformity. Heart:  Regular rate and rhythm, S1, S2 normal, no murmur, click, rub or gallop. Abdomen:   Soft, non-tender. Bowel sounds normal. No masses,  No organomegaly. Extremities: Extremities normal, atraumatic, no cyanosis or edema. Pulses: 2+ and symmetric all extremities. Skin: Skin color, texture, turgor normal. No rashes or lesions   Neurologic: CNII-XII intact. No gross sensory or motor deficits     Data Review:       Recent Days:  No results for input(s): WBC, HGB, HCT, PLT, HGBEXT, HCTEXT, PLTEXT, HGBEXT, HCTEXT, PLTEXT in the last 72 hours. No results for input(s): NA, K, CL, CO2, GLU, BUN, CREA, CA, MG, PHOS, ALB, TBIL, TBILI, ALT, INR, INREXT, INREXT in the last 72 hours. No lab exists for component: SGOT    No results for input(s): PH, PCO2, PO2, HCO3, FIO2 in the last 72 hours. 24 Hour Results:  No results found for this or any previous visit (from the past 24 hour(s)). Assessment/     1. Gait disturbance with lower extremity weakness  - D/t cord compression and tumor  - Continue IV dexamethasone as per radiation oncology recommendations, 10 mg of 1 dose now then 8 mg every 8 hours and monitor closely.     2. Severe back pain secondary to suspected spinal mass  - Increased the dose of the oxycodone to long-acting OxyContin 20 mg twice a day with IV morphine breakthrough pain relief. - Mass at L3 that was clearly present 2 weeks ago on MRI and appears to have progressed causing increased pain and possibly lower extremity weakness.  - Discussed with Dr. Papo Cordova; per Spine surgeons --> very high complication risk.   - Radiation oncology consulted --> XRT started; 10 total (30 Gy in 10 fractions per Rad Onc starting on 01/03/2023)  - Add fentanyl --> increase to 50mcg; change other meds to PRN  - Add xanax prn    3. 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 COPD  - Mild with no signs of exacerbation on DuoNeb which we will continue      Plan:  Continue supportive care   Continue pain control and increase frequency of oral pain medication  Docusate 100 mg twice daily for constipation  Anticipate discharge to inpatient rehab over the weekend    Care Plan discussed with: Patient/Family    Total time spent with patient: 30 minutes.     Maggy Bird MD

## 2023-01-13 ENCOUNTER — HOSPITAL ENCOUNTER (INPATIENT)
Dept: RADIATION THERAPY | Age: 83
Discharge: HOME | DRG: 055 | End: 2023-01-13
Payer: MEDICARE

## 2023-01-13 PROCEDURE — 77412 RADIATION TX DELIVERY LVL 3: CPT

## 2023-01-13 PROCEDURE — 74011250637 HC RX REV CODE- 250/637: Performed by: HOSPITALIST

## 2023-01-13 PROCEDURE — 77010033678 HC OXYGEN DAILY

## 2023-01-13 PROCEDURE — 94640 AIRWAY INHALATION TREATMENT: CPT

## 2023-01-13 PROCEDURE — 97530 THERAPEUTIC ACTIVITIES: CPT

## 2023-01-13 PROCEDURE — 74011250637 HC RX REV CODE- 250/637: Performed by: STUDENT IN AN ORGANIZED HEALTH CARE EDUCATION/TRAINING PROGRAM

## 2023-01-13 PROCEDURE — 74011000250 HC RX REV CODE- 250: Performed by: HOSPITALIST

## 2023-01-13 PROCEDURE — 74011250637 HC RX REV CODE- 250/637: Performed by: INTERNAL MEDICINE

## 2023-01-13 PROCEDURE — 74011250636 HC RX REV CODE- 250/636: Performed by: PHYSICIAN ASSISTANT

## 2023-01-13 PROCEDURE — 65270000029 HC RM PRIVATE

## 2023-01-13 PROCEDURE — 74011250636 HC RX REV CODE- 250/636: Performed by: HOSPITALIST

## 2023-01-13 PROCEDURE — 94761 N-INVAS EAR/PLS OXIMETRY MLT: CPT

## 2023-01-13 RX ORDER — ENOXAPARIN SODIUM 100 MG/ML
40 INJECTION SUBCUTANEOUS EVERY 24 HOURS
Status: DISCONTINUED | OUTPATIENT
Start: 2023-01-14 | End: 2023-01-15

## 2023-01-13 RX ADMIN — DOCUSATE SODIUM 100 MG: 100 CAPSULE, LIQUID FILLED ORAL at 11:12

## 2023-01-13 RX ADMIN — FOLIC ACID 1 MG: 1 TABLET ORAL at 07:22

## 2023-01-13 RX ADMIN — DEXAMETHASONE SODIUM PHOSPHATE 8 MG: 4 INJECTION, SOLUTION INTRA-ARTICULAR; INTRALESIONAL; INTRAMUSCULAR; INTRAVENOUS; SOFT TISSUE at 15:03

## 2023-01-13 RX ADMIN — GABAPENTIN 400 MG: 400 CAPSULE ORAL at 15:03

## 2023-01-13 RX ADMIN — PANTOPRAZOLE SODIUM 40 MG: 40 TABLET, DELAYED RELEASE ORAL at 07:22

## 2023-01-13 RX ADMIN — DEXAMETHASONE SODIUM PHOSPHATE 8 MG: 4 INJECTION, SOLUTION INTRA-ARTICULAR; INTRALESIONAL; INTRAMUSCULAR; INTRAVENOUS; SOFT TISSUE at 21:47

## 2023-01-13 RX ADMIN — SODIUM CHLORIDE, PRESERVATIVE FREE 10 ML: 5 INJECTION INTRAVENOUS at 15:04

## 2023-01-13 RX ADMIN — GABAPENTIN 400 MG: 400 CAPSULE ORAL at 11:12

## 2023-01-13 RX ADMIN — OXYCODONE HYDROCHLORIDE 10 MG: 5 TABLET ORAL at 10:25

## 2023-01-13 RX ADMIN — GABAPENTIN 400 MG: 400 CAPSULE ORAL at 21:45

## 2023-01-13 RX ADMIN — OXYCODONE HYDROCHLORIDE 10 MG: 5 TABLET ORAL at 19:17

## 2023-01-13 RX ADMIN — SODIUM CHLORIDE, PRESERVATIVE FREE 10 ML: 5 INJECTION INTRAVENOUS at 07:29

## 2023-01-13 RX ADMIN — TIOTROPIUM BROMIDE INHALATION SPRAY 2 PUFF: 3.12 SPRAY, METERED RESPIRATORY (INHALATION) at 07:45

## 2023-01-13 RX ADMIN — BUDESONIDE AND FORMOTEROL FUMARATE DIHYDRATE 1 PUFF: 160; 4.5 AEROSOL RESPIRATORY (INHALATION) at 20:08

## 2023-01-13 RX ADMIN — ENOXAPARIN SODIUM 40 MG: 100 INJECTION SUBCUTANEOUS at 23:34

## 2023-01-13 RX ADMIN — DEXAMETHASONE SODIUM PHOSPHATE 8 MG: 4 INJECTION, SOLUTION INTRA-ARTICULAR; INTRALESIONAL; INTRAMUSCULAR; INTRAVENOUS; SOFT TISSUE at 07:22

## 2023-01-13 RX ADMIN — BUDESONIDE AND FORMOTEROL FUMARATE DIHYDRATE 1 PUFF: 160; 4.5 AEROSOL RESPIRATORY (INHALATION) at 07:45

## 2023-01-13 RX ADMIN — SODIUM CHLORIDE, PRESERVATIVE FREE 10 ML: 5 INJECTION INTRAVENOUS at 21:48

## 2023-01-13 RX ADMIN — POLYETHYLENE GLYCOL 3350 17 G: 17 POWDER, FOR SOLUTION ORAL at 21:43

## 2023-01-13 NOTE — PROGRESS NOTES
OCCUPATIONAL THERAPY TREATMENT  Patient: Hope Tan (52 y.o. female)  Date: 1/13/2023  Diagnosis: Acute back pain [M54.9] <principal problem not specified>      Precautions:    Chart, occupational therapy assessment, plan of care, and goals were reviewed. ASSESSMENT  Pt continues with skilled OT services and is progressing towards goals. Upon DE ANDA arrival, pt semi supine in bed and agreeable to tx session at this time. Overall, pt continues to present with deficits in generalized strength/AROM, coordination, bed mobility, static/dynamic sitting and standing balance and functional activity tolerance during performance of ADLs/mobility (see below for objective details and assist levels). Pt noted with limited improvement this date with bed mobility and transfers. Pt very motivated throughout session and willing to participate despite high pain levels being reported. Pt completed bed mobility with overall Juliette x2 and reporting dizziness once at EOB requiring 1-2 minutes for it to resolve. Pt attempted x2 trials of sit>stand using Earlean Mitts and MaxA x2 and only able to clear bottom from EOB x1 time and unable to stand fully upright. Pt noted with continued increased difficulty for standing despite constant effort put forward by pt. Pt able to complete seated grooming at EOB with setup A and good sitting balance noted. Pt required total A for donning/doffing of socks due to limited reach and pain and required total A for bladder hygiene once returned to supine. Recommend d/c to [facility] once medically appropriate. Other factors to consider for discharge: family/social support, DME, time since onset, severity of deficits, decline from functional baseline         PLAN :  Patient continues to benefit from skilled intervention to address the above impairments. Continue treatment per established plan of care to address goals.     Recommendation for discharge: (in order for the patient to meet his/her long term goals)  Tae Anand    This discharge recommendation:  Has been made in collaboration with the attending provider and/or case management    IF patient discharges home will need the following DME: TBD       SUBJECTIVE:   Patient stated Paola Gonzalez got to try to do it.     OBJECTIVE DATA SUMMARY:   Cognitive/Behavioral Status:  Neurologic State: Alert  Orientation Level: Oriented X4    Functional Mobility and Transfers for ADLs:  Bed Mobility:  Rolling: Minimum assistance; Moderate assistance;Assist x1  Supine to Sit: Minimum assistance;Assist x2  Sit to Supine: Minimum assistance;Assist x2  Scooting: Minimum assistance;Assist x1    Transfers:  Sit to Stand: Maximum assistance;Assist x2;Adaptive equipment (cortez steady)    Balance:  Sitting: Impaired  Sitting - Static: Good (unsupported)  Sitting - Dynamic: Fair (occasional)  Standing: Impaired  Standing - Static: Poor;Constant support  Standing - Dynamic : Poor;Constant support    ADL Intervention:  Grooming  Position Performed: Seated edge of bed  Washing Face: Set-up  Brushing/Combing Hair: Set-up    Lower Body Dressing Assistance  Socks: Total assistance (dependent)    Toileting  Bladder Hygiene: Total assistance (dependent)    Pain:  8/10 back pain    Activity Tolerance:   Fair and requires rest breaks  Please refer to the flowsheet for vital signs taken during this treatment. After treatment patient left in no apparent distress:   Bed locked and in lowest position  Supine in bed, Call bell within reach, Bed / chair alarm activated, and Side rails x 3    COMMUNICATION/COLLABORATION:   The patients plan of care was discussed with: Physical therapist and Registered nurse. [place] with PT for increased safety for pt and clinician.     ADILSON Lopez  Time Calculation: 32 mins    Problem: Self Care Deficits Care Plan (Adult)  Goal: *Acute Goals and Plan of Care (Insert Text)  Description:   FUNCTIONAL STATUS PRIOR TO ADMISSION: Patient was modified independent using a rolling walker for functional mobility. Patient required minimal assistance for basic and instrumental ADLs. HOME SUPPORT: The patient lived alone with daughter to provide assistance. Occupational Therapy Goals  Initiated 1/4/2023  Patient Goal: Pt did not state. 1.  Patient will perform lower body dressing with minimal assistance/contact guard assist within 7 day(s). 2.  Patient will perform grooming with modified independence within 7 day(s). 3.  Patient will perform bathing with minimal assistance/contact guard assist within 7 day(s). 4.  Patient will perform toilet transfers with minimal assistance/contact guard assist within 7 day(s). 5.  Patient will perform all aspects of toileting with minimal assistance/contact guard assist within 7 day(s). 6.  Patient will participate in upper extremity therapeutic exercise/activities with independence within 7 day(s). 7.  Patient will utilize energy conservation techniques during functional activities with verbal cues within 7 day(s).   Outcome: Progressing Towards Goal

## 2023-01-13 NOTE — PROGRESS NOTES
CM reviewed chart and spoke with primary physician. Patient's last radiation treatment is not until Monday. CM spoke with [facility] and they can accept patient on Monday after treatment. BARTOLOME spoke with patient's daughter, Parker James. She is aware and agreeable to plan. CM will continue to follow.

## 2023-01-13 NOTE — PROGRESS NOTES
Hospitalist Progress Note    Subjective:   Daily Progress Note: 1/13/2023 9:21 AM    Hospital Course: Gely Otero 78 y.o. female history of carcinoma of the lung with lobectomy in 1996, since then on follow-up with surveillance. She woke up the morning of 12/30/2023 with severe lower extremity weakness and pain unable to stand up and walk. 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 She is recommended to follow-up with oncology, Dr. Marylene An, had MRI and PET scan scheduled January 2023. States the back pain started getting worse, its severe, given oxycodone 10 mg 3 times a day but not helping the pain. Its radiating into the legs, now with severe weakness. She is crying with pain worse with any movement. Due to the mass in the lumbar spine requested for CT of thoracic spine and lumbar spine, consult placed to radiation oncology. As she is not completely paralyzed, radiation oncology recommended to continue steroids at this time and follow-up. Dr. Rey Bird, rad/onc, to start radiation treatment on 01/03. Oncology consult. Continue pain management inpatient for now. Family interested in pursuing inpatient rehab post radiation therapy. Will complete 10 treatments of radiation prior to discharge      Subjective: Patient says she still has pain but doing okay.     Current Facility-Administered Medications   Medication Dose Route Frequency    docusate sodium (COLACE) capsule 100 mg  100 mg Oral BID PRN    oxyCODONE IR (ROXICODONE) tablet 10 mg  10 mg Oral Q4H PRN    fentaNYL (DURAGESIC) 25 mcg/hr patch 2 Patch  2 Patch TransDERmal Q72H    ALPRAZolam (XANAX) tablet 0.25 mg  0.25 mg Oral TID PRN    docusate sodium (COLACE) capsule 100 mg  100 mg Oral DAILY    cyclobenzaprine (FLEXERIL) tablet 10 mg  10 mg Oral TID PRN    polyethylene glycol (MIRALAX) packet 17 g  17 g Oral QHS    hydrOXYzine pamoate (VISTARIL) capsule 25 mg  25 mg Oral TID PRN    diphenhydrAMINE (BENADRYL) capsule 25 mg  25 mg Oral Q6H PRN    albuterol-ipratropium (DUO-NEB) 2.5 MG-0.5 MG/3 ML  3 mL Nebulization Q4H PRN    budesonide-formoteroL (SYMBICORT) 160-4.5 mcg/actuation HFA inhaler 1 Puff  1 Puff Inhalation BID RT    And    tiotropium bromide (SPIRIVA RESPIMAT) 2.5 mcg /actuation  2 Puff Inhalation DAILY    albuterol (PROVENTIL HFA, VENTOLIN HFA, PROAIR HFA) inhaler 2 Puff  2 Puff Inhalation N7L PRN    folic acid (FOLVITE) tablet 1 mg  1 mg Oral ACB    gabapentin (NEURONTIN) capsule 400 mg  400 mg Oral TID    pantoprazole (PROTONIX) tablet 40 mg  40 mg Oral ACB    sodium chloride (NS) flush 5-40 mL  5-40 mL IntraVENous Q8H    sodium chloride (NS) flush 5-40 mL  5-40 mL IntraVENous PRN    acetaminophen (TYLENOL) tablet 650 mg  650 mg Oral Q6H PRN    Or    acetaminophen (TYLENOL) suppository 650 mg  650 mg Rectal Q6H PRN    ondansetron (ZOFRAN ODT) tablet 4 mg  4 mg Oral Q6H PRN    Or    ondansetron (ZOFRAN) injection 4 mg  4 mg IntraVENous Q6H PRN    dexamethasone (DECADRON) 4 mg/mL injection 8 mg  8 mg IntraVENous Q8H        Review of Systems  Constitutional: No fevers, No chills, No sweats, ++ fatigue, ++ Weakness  Eyes: No redness  Ears, nose, mouth, throat, and face: No nasal congestion, No sore throat, No voice change  Respiratory: No Shortness of Breath, No cough, No wheezing  Cardiovascular: No chest pain, No palpitations, No extremity edema  Gastrointestinal: No nausea, No vomiting, No diarrhea, No abdominal pain  Genitourinary: No frequency, No dysuria, No hematuria  Integument/breast: No skin lesion(s)   Neurological: No Confusion, No headaches, No dizziness      Objective:     Visit Vitals  /60 (BP 1 Location: Left upper arm, BP Patient Position: At rest)   Pulse 68   Temp 98.3 °F (36.8 °C)   Resp 16   Ht 5' 5.98\" (1.676 m)   Wt 81.6 kg (180 lb)   SpO2 96%   BMI 29.07 kg/m²    O2 Flow Rate (L/min): 1.5 l/min O2 Device: Nasal cannula    Temp (24hrs), Av °F (36.7 °C), Min:97.8 °F (36.6 °C), Max:98.3 °F (36.8 °C)      No intake/output data recorded. 1901 -  0700  In: 240 [P.O.:240]  Out: 2100 [Urine:2100]    PHYSICAL EXAM:  Constitutional: No acute distress  Skin: Extremities and face reveal no rashes. HEENT: Sclerae anicteric. Extra-occular muscles are intact. No oral ulcers. The neck is supple and no masses. Cardiovascular: Regular rate and rhythm. Respiratory:  Clear breath sounds bilaterally with no wheezes, rales, or rhonchi. GI: Abdomen nondistended, soft, and nontender. Normal active bowel sounds. Musculoskeletal: No pitting edema of the lower legs. Able to move all ext  Neurological:  Patient is alert and oriented. Cranial nerves II-XII grossly intact  Psychiatric: Mood appears appropriate       Data Review    No results found for this or any previous visit (from the past 24 hour(s)). CBC:   Lab Results   Component Value Date/Time    WBC 7.4 2023 08:36 AM    RBC 4.24 2023 08:36 AM    HGB 13.0 2023 08:36 AM    HCT 40.6 2023 08:36 AM    PLATELET 912  08:36 AM     BMP:   Lab Results   Component Value Date/Time    Glucose 112 (H) 2023 08:36 AM    Sodium 136 2023 08:36 AM    Potassium 4.4 2023 08:36 AM    Chloride 97 2023 08:36 AM    CO2 33 (H) 2023 08:36 AM    BUN 28 (H) 2023 08:36 AM    Creatinine 0.50 (L) 2023 08:36 AM    Calcium 9.2 2023 08:36 AM         Assessment:   1. Severe back pain secondary to spinal mass  2. Gait disturbances with lower extremity weakness  3. 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 COPD    Plan:   1. Oncology and radiation oncology consulted. Patient will complete 10 cycles of radiation prior to discharge. Today is day #9 of 10. Patient on fentanyl patch, Neurontin, oxycodone, Flexeril  2. PT and OT recommend rehab placement.   Most likely will not happen until Monday. On IV Decadron  3. Oncology consulted  4. Continue Symbicort/Spiriva. Albuterol Hailer as needed    Dispo: Greater than 48 hours. Barriers include completion of radiation oncology. Discharge to IRF     CODE STATUS full     DVT prophylaxis: Lovenox  Ulcer prophylaxis: Protonix    Care Plan discussed with: Patient/Family, Nurse, and     Total time spent with patient: 34 minutes.

## 2023-01-13 NOTE — PROGRESS NOTES
Problem: Mobility Impaired (Adult and Pediatric)  Goal: *Acute Goals and Plan of Care (Insert Text)  Description: FUNCTIONAL STATUS PRIOR TO ADMISSION: Patient was modified independent using a rolling walker for functional mobility. Patient required minimal assistance for basic and instrumental ADLs. HOME SUPPORT PRIOR TO ADMISSION: The patient lived alone with daughter to provide assistance. Physical Therapy Goals  Initiated 1/4/2023  Pt stated goal: to go home  Pt will be I with LE HEP in 7 days. Pt will perform bed mobility with mod I utilizing log roll technique in 7 days. Pt will perform transfers with min A in 7 days. Pt will amb 5 feet with LRAD safely with mod I in 7 days. Pt will verbalize and demonstrate compliance with spinal precautions to include no bending, lifting or twisting in 7 days. Outcome:  Minimally Progressing Towards Goal  PHYSICAL THERAPY REEVALUATION  Patient: Erin Cain (23 y.o. female)  Date: 1/13/2023  Primary Diagnosis: Acute back pain [M54.9]       Precautions: fall         ASSESSMENT  Patient initially seen for PT evaluation 1/4/23 and 4 skilled PT sessions since evalution. Patient seen today for PT reevaluation s/t LOS. Patient A&O x4. Pt semi-supine upon PT/OT arrival, agreeable to session. Based on the objective data described, the patient presents with generalized weakness, impaired functional mobility, impaired amb, impaired balance (See below for objective details and assist levels). Pt is very motivated and willing to participate in PT in spite of high pain levels. Pt able to get to EOB with min A, but reporting dizziness/wooziness once EOB requiring 1-2 min rest break to resolve. Pt attempted sit to stand x 2 reps with cortez steady and max A x 2. Pt able to clear buttocks off of bed, but unable to fully stand erect. It is becoming more difficult  for pt to stand with each successive treatment though she puts forth good effort.   Pt also noted to have foot drop (loss of DF and eversion) in R foot today which is a new finding from previous treatments. Pt also noted to have no active knee extension and minimal hip flexion on the R. Overall pt tolerated session fair today, currently with pain of 8/10 reported in LB. Pt will benefit from continued skilled PT to address above deficits and return to PLOF, PT goals and POC reviewed on this date and updated to reflect current progress. Current PT DC recommendation Tae Anand vs IRF once medically appropriate. Current Level of Function Impacting Discharge (mobility/balance): impaired mobility, level of assist         Patient will benefit from skilled therapy intervention to address the above noted impairments. PLAN :  Recommendations and Planned Interventions: bed mobility training, transfer training, therapeutic exercises, patient and family training/education, and therapeutic activities      Recommend for staff: Frequent repositioning to prevent skin breakdown and LE elevation for management of edema    Frequency/Duration: Patient will be followed by physical therapy: 3-5x/week to address goals.     Recommendation for discharge: (in order for the patient to meet his/her long term goals)  Tae Anand    This discharge recommendation:  Has been made in collaboration with the attending provider and/or case management    Equipment recommendations for successful discharge (if) home: none         SUBJECTIVE:   Patient stated I will try my best.    OBJECTIVE DATA SUMMARY:   HISTORY:    Past Medical History:   Diagnosis Date    Arrhythmia     \"fast heart rate\" at times, takes Toprol to control    Arthritis     Asthma     Cancer ([facility] [address] 01/01/1996    RUL lung    Cancer ([facility] [address] 01/01/2001    left breast    Cancer ([facility] [address] 2013, 2014    right lung    COPD     Dyslipidemia 04/29/2014    GERD (gastroesophageal reflux disease)     Hyperlipidemia     Irregular heart beat 1st-1983,early 2000's SVT per patient-none since starting metoprolol early 2000's    Lung cancer ([facility] [address] 04/29/2014    Lung cancer, lower lobe ([facility] [address] 03/01/2013    T3Nx adenocarcinoma RLL    Other ill-defined conditions(799.89)     urinary urgency    Sleep apnea     Unspecified adverse effect of anesthesia     severe itching after lung surgery     Past Surgical History:   Procedure Laterality Date    HX APPENDECTOMY      HX BLADDER SUSPENSION  around 2004    HX BREAST LUMPECTOMY  2001    left w/nodes removed    HX CATARACT REMOVAL Bilateral 2013    HX HYSTERECTOMY  1985    ANTON    HX ORTHOPAEDIC Right 1980's    bunion removal    HX OTHER SURGICAL  3/5/2013    RLL superior segmentectomy    HX OTHER SURGICAL  3/11/2013    Resection of RLL margins    HX UROLOGICAL      Bladder tuck    [address], 2013, 2014    right lung (see \"other\")    [place] THORACOSCOPY SURG LOBECTOMY  1996    RUL lung cancer       Home Situation  Home Environment: Private residence  # Steps to Enter: 3  Rails to Enter: Yes  Hand Rails : Bilateral  Wheelchair Ramp: No  One/Two Story Residence: One story  Living Alone: No  Support Systems: Child(shawnee)  Patient Expects to be Discharged to[de-identified] Rehab hospital/unit acute  Current DME Used/Available at Home: Walker, rolling, Winnie [place], rollator, Grab bars, [place], straight, Shower chair, Raised toilet seat  Tub or Shower Type: Tub/Shower combination    EXAMINATION/PRESENTATION/DECISION MAKING:   Critical Behavior:  Neurologic State: Alert  Orientation Level: Oriented X4  Cognition: Appropriate for age attention/concentration, Follows commands                                 Functional Mobility:  Bed Mobility:  Rolling: Minimum assistance; Moderate assistance;Assist x1  Supine to Sit: Minimum assistance;Assist x2  Sit to Supine: Minimum assistance;Assist x2  Scooting: Minimum assistance;Assist x1  Transfers:  Sit to Stand: Maximum assistance;Assist x2;Adaptive equipment (cortez steady)  Stand to Sit: Maximum assistance;Assist x2;Adaptive equipment (cortez steady)                       Balance:   Sitting: Impaired  Sitting - Static: Good (unsupported)  Sitting - Dynamic: Fair (occasional)  Standing: Impaired  Standing - Static: Poor;Constant support  Standing - Dynamic : Poor;Constant support  Ambulation/Gait Training:                                                        Therapeutic Exercises:   Seated marches x 10, LAQ x 5 each (AROM on L, PROM with eccentric lowering on R), ankle pumps x 10 (AROM on L, PROM with eccentric lowering on R)    Functional Measure:  [facility] AM-PAC 6 Clicks         Basic Mobility Inpatient Short Form  How much difficulty does the patient currently have. .. Unable A Lot A Little None   1. Turning over in bed (including adjusting bedclothes, sheets and blankets)? [] 1   [] 2   [x] 3   [] 4   2. Sitting down on and standing up from a chair with arms ( e.g., wheelchair, bedside commode, etc.)   [x] 1   [] 2   [] 3   [] 4   3. Moving from lying on back to sitting on the side of the bed? [] 1   [] 2   [x] 3   [] 4          How much help from another person does the patient currently need. .. Total A Lot A Little None   4. Moving to and from a bed to a chair (including a wheelchair)? [x] 1   [] 2   [] 3   [] 4   5. Need to walk in hospital room? [x] 1   [] 2   [] 3   [] 4   6. Climbing 3-5 steps with a railing? [x] 1   [] 2   [] 3   [] 4   © , Trustees of [facility], under license to [employer]. All rights reserved     Score:  Initial: 10 Most Recent: X (Date: 23 )   Interpretation of Tool:  Represents activities that are increasingly more difficult (i.e. Bed mobility, Transfers, Gait).   Score 24 23 22-20 19-15 14-10 9-7 6   Modifier CH CI CJ CK CL CM CN       Pain Ratin/10 in back    Activity Tolerance:   Fair and requires rest breaks    After treatment patient left in no apparent distress:   Bed locked and in lowest position Supine in bed, Call bell within reach, and Side rails x 3 and nsg updated. COMMUNICATION/EDUCATION:   The patients plan of care was discussed with: Occupational therapy assistant and Registered nurse. Patient/family agree to work toward stated goals and plan of care. PT/OT sessions occurred together for increased safety of pt and clinician.      Thank you for this referral.  Charlette Guerra, PT, DPT   Time Calculation: 32 mins

## 2023-01-13 NOTE — PROGRESS NOTES
Comprehensive Nutrition Assessment    Type and Reason for Visit: Reassess (Follow up)    Nutrition Recommendations/Plan:   Continue current diet   Ensure Enlivex2/d (700kcals, 40g pro), pt prefers vanilla  Monitor and document all PO/ONS intake and Bms in I/Os     Malnutrition Assessment:  Malnutrition Status:  No malnutrition (01/13/23 1156)    Context:  Acute illness     Findings of the 6 clinical characteristics of malnutrition:   Energy Intake:  No significant decrease in energy intake  Weight Loss:  No significant weight loss     Body Fat Loss:  No significant body fat loss,     Muscle Mass Loss:  No significant muscle mass loss,    Fluid Accumulation:  No significant fluid accumulation,     Strength:  Not performed     Nutrition Assessment:    Admitted for acute back pain, +L3 intradural mass, began radiation. No hx of significant weight loss, appetite/intakes at baseline. Will add ONS to help meet needs in setting of hypermetabolic disease. (1/13) Appetite and intakes strong (>76%) w/good ONS acceptance. Cnt current diet and ONS. DC pending final radiation tx 1/16. Labs: No labs to review. Meds: colace, fentanyl patch, folvite, oxycodone, protonix. Nutrition Related Findings:    No acute findings per NFPE. No n/v/d nor c/s issues. No edema. BM 1/11, takes Miralax daily. Wound Type: None    Current Nutrition Intake & Therapies:  Average Meal Intake: %  Average Supplement Intake: 51-75%  ADULT DIET Regular  ADULT ORAL NUTRITION SUPPLEMENT Breakfast, Dinner; Standard High Calorie/High Protein    Anthropometric Measures:  Height: 5' 5.98\" (167.6 cm)  Ideal Body Weight (IBW): 130 lbs (59 kg)     Current Body Wt:  81.6 kg (179 lb 14.3 oz), 138.4 % IBW. Stated  Current BMI (kg/m2): 29        Weight Adjustment: No adjustment                 BMI Category: Overweight (BMI 25.0-29. 9)    Estimated Daily Nutrient Needs:  Energy Requirements Based On: Kcal/kg  Weight Used for Energy Requirements: Current  Energy (kcal/day): 2448kcals/day (30kcal/kg, CA)  Weight Used for Protein Requirements: Current  Protein (g/day): 97-106g/day (1.2-1.3g/kg, CA)  Method Used for Fluid Requirements: 1 ml/kcal  Fluid (ml/day): 2448mL    Nutrition Diagnosis:   Increased nutrient needs related to increased demand for energy/nutrients as evidenced by other (specify) (Lung CA)    Nutrition Interventions:   Food and/or Nutrient Delivery: Continue current diet, Continue oral nutrition supplement  Nutrition Education/Counseling: No recommendations at this time  Coordination of Nutrition Care: Continue to monitor while inpatient  Plan of Care discussed with: Pt and daughter    Goals:  Previous Goal Met: Goal(s) achieved  Goals: Meet at least 75% of estimated needs, by next RD assessment     Nutrition Monitoring and Evaluation:   Behavioral-Environmental Outcomes: None identified  Food/Nutrient Intake Outcomes: Diet advancement/tolerance, Food and nutrient intake, Supplement intake  Physical Signs/Symptoms Outcomes: Weight, Constipation, Meal time behavior    Discharge Planning:    Continue oral nutrition supplement    Markus Morris  Contact: [phone]

## 2023-01-13 NOTE — PROGRESS NOTES
Hospitalist Progress Note         Mohinder Ring          Daily Progress Note: 1/13/2023      Subjective: The patient is seen for follow  up. Gely Otero 78 y.o. female history of carcinoma of the lung with lobectomy in 1996, since then on follow-up with surveillance. She woke up this morning with severe lower extremity weakness and pain unable to stand up and walk. 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 She is recommended to follow-up with oncology, Dr. Ngoc Urrutia, had MRI and PET scan scheduled January 2023. States the back pain started getting worse, its severe, given oxycodone 10 mg 3 times a day but not helping the pain. Its radiating into the legs, now with severe weakness. She is crying with pain worse with any movement. Due to the mass in the lumbar spine requested for CT of thoracic spine and lumbar spine, consult placed to radiation oncology. As she is not completely paralyzed, radiation oncology recommended to continue steroids at this time and follow-up. Dr. Alejandro Zhang, rad/onc, to start radiation treatment on 01/03. Oncology consult. Continue pain management inpatient for now. Family interested in pursuing inpatient rehab post radiation therapy.   ----  Patient seen for follow-up. Patient is alert and oriented, in no apparent distress. She received her 8th radiation therapy treatment yesterday. Patient has no change in symptoms in terms of shooting pain with pressure on the tail bone. Patient is improving movement of LE, with left side moving better than right side.          Problem List:  Problem List as of 1/13/2023 Date Reviewed: 12/30/2022            Codes Class Noted - Resolved    Acute back pain ICD-10-CM: M54.9  ICD-9-CM: 724.5  12/30/2022 - Present        Lower extremity weakness ICD-10-CM: R29.898  ICD-9-CM: 729.89  12/30/2022 - Present Gait disturbance ICD-10-CM: R26.9  ICD-9-CM: 781.2  12/30/2022 - Present        Bronchitis ICD-10-CM: J40  ICD-9-CM: 953  11/16/2021 - Present        Sepsis ([facility] ICD-10-CM: A41.9  ICD-9-CM: 038.9, 995.91  11/13/2021 - Present        Acute and chronic respiratory failure with hypoxia [facility]) ICD-10-CM: J96.21  ICD-9-CM: 518.84, 799.02  11/13/2021 - Present        Lung cancer ([facility] ICD-10-CM: C34.90  ICD-9-CM: 162.9  4/29/2014 - Present        Dyslipidemia ICD-10-CM: E78.5  ICD-9-CM: 272.4  4/29/2014 - Present       Medications reviewed  Current Facility-Administered Medications   Medication Dose Route Frequency    docusate sodium (COLACE) capsule 100 mg  100 mg Oral BID PRN    oxyCODONE IR (ROXICODONE) tablet 10 mg  10 mg Oral Q4H PRN    fentaNYL (DURAGESIC) 25 mcg/hr patch 2 Patch  2 Patch TransDERmal Q72H    ALPRAZolam (XANAX) tablet 0.25 mg  0.25 mg Oral TID PRN    docusate sodium (COLACE) capsule 100 mg  100 mg Oral DAILY    cyclobenzaprine (FLEXERIL) tablet 10 mg  10 mg Oral TID PRN    polyethylene glycol (MIRALAX) packet 17 g  17 g Oral QHS    hydrOXYzine pamoate (VISTARIL) capsule 25 mg  25 mg Oral TID PRN    diphenhydrAMINE (BENADRYL) capsule 25 mg  25 mg Oral Q6H PRN    albuterol-ipratropium (DUO-NEB) 2.5 MG-0.5 MG/3 ML  3 mL Nebulization Q4H PRN    budesonide-formoteroL (SYMBICORT) 160-4.5 mcg/actuation HFA inhaler 1 Puff  1 Puff Inhalation BID RT    And    tiotropium bromide (SPIRIVA RESPIMAT) 2.5 mcg /actuation  2 Puff Inhalation DAILY    albuterol (PROVENTIL HFA, VENTOLIN HFA, PROAIR HFA) inhaler 2 Puff  2 Puff Inhalation P8Y PRN    folic acid (FOLVITE) tablet 1 mg  1 mg Oral ACB    gabapentin (NEURONTIN) capsule 400 mg  400 mg Oral TID    pantoprazole (PROTONIX) tablet 40 mg  40 mg Oral ACB    sodium chloride (NS) flush 5-40 mL  5-40 mL IntraVENous Q8H    sodium chloride (NS) flush 5-40 mL  5-40 mL IntraVENous PRN    acetaminophen (TYLENOL) tablet 650 mg  650 mg Oral Q6H PRN    Or    acetaminophen (TYLENOL) suppository 650 mg  650 mg Rectal Q6H PRN    ondansetron (ZOFRAN ODT) tablet 4 mg  4 mg Oral Q6H PRN    Or    ondansetron (ZOFRAN) injection 4 mg  4 mg IntraVENous Q6H PRN    dexamethasone (DECADRON) 4 mg/mL injection 8 mg  8 mg IntraVENous Q8H       Review of Systems:   A comprehensive review of systems was negative except for that written in the HPI. Objective:   Physical Exam:     Visit Vitals  /60 (BP 1 Location: Left upper arm, BP Patient Position: At rest)   Pulse 68   Temp 98.3 °F (36.8 °C)   Resp 16   Ht 5' 5.98\" (1.676 m)   Wt 180 lb (81.6 kg)   SpO2 96%   BMI 29.07 kg/m²    O2 Flow Rate (L/min): 1.5 l/min O2 Device: Nasal cannula    Temp (24hrs), Av °F (36.7 °C), Min:97.8 °F (36.6 °C), Max:98.3 °F (36.8 °C)    No intake/output data recorded.  1901 -  0700  In: 240 [P.O.:240]  Out: 1650 [Urine:1650]    General:  Alert, cooperative, no distress, appears stated age. Lungs:   Clear to auscultation bilaterally. Chest wall:  No tenderness or deformity. Heart:  Regular rate and rhythm, S1, S2 normal, no murmur, click, rub or gallop. Abdomen:   Soft, non-tender. Bowel sounds normal. No masses,  No organomegaly. Extremities: Extremities normal, atraumatic, no cyanosis or edema. Pulses: 2+ and symmetric all extremities. Skin: Skin color, texture, turgor normal. No rashes or lesions   Neurologic: CNII-XII intact. No gross sensory or motor deficits     Data Review:       Recent Days:  No results for input(s): WBC, HGB, HCT, PLT, HGBEXT, HCTEXT, PLTEXT, HGBEXT, HCTEXT, PLTEXT in the last 72 hours. No results for input(s): NA, K, CL, CO2, GLU, BUN, CREA, CA, MG, PHOS, ALB, TBIL, TBILI, ALT, INR, INREXT, INREXT in the last 72 hours. No lab exists for component: SGOT    No results for input(s): PH, PCO2, PO2, HCO3, FIO2 in the last 72 hours. 24 Hour Results:  No results found for this or any previous visit (from the past 24 hour(s)).         Assessment/      Lumbar Mass  at L3   Most likely due to recurrence of lung adenocarcinoma   Radiation Therapy started, completed 8th treatment yesterday   Continue pain management with dexamethasone, fentanyl patches, and gabapentin      2. Severe Back Pain   Continue current pain medications    3. Gait disturbance and lower extremity weakness  Most likely due to intradural cord compression   Continue IV dexamethasone as per radiation oncology     4. COPD   Continue duo-neb therapy as needed  No signs of exacerbation     Continue supportive care   Patient and patient's daughter awaiting results regarding cancer metastasis, would like to be discharged home on hospice with [facility] if cancer has spread. Discharge pending patient completes all 10 sessions of radiation therapy. [facility] is able to accept patient.        Mohinder Ring

## 2023-01-14 PROCEDURE — 65270000029 HC RM PRIVATE

## 2023-01-14 PROCEDURE — 77010033678 HC OXYGEN DAILY

## 2023-01-14 PROCEDURE — 74011250637 HC RX REV CODE- 250/637: Performed by: INTERNAL MEDICINE

## 2023-01-14 PROCEDURE — 94761 N-INVAS EAR/PLS OXIMETRY MLT: CPT

## 2023-01-14 PROCEDURE — 74011250636 HC RX REV CODE- 250/636: Performed by: HOSPITALIST

## 2023-01-14 PROCEDURE — 74011250637 HC RX REV CODE- 250/637: Performed by: STUDENT IN AN ORGANIZED HEALTH CARE EDUCATION/TRAINING PROGRAM

## 2023-01-14 PROCEDURE — 97530 THERAPEUTIC ACTIVITIES: CPT

## 2023-01-14 PROCEDURE — 74011250637 HC RX REV CODE- 250/637: Performed by: HOSPITALIST

## 2023-01-14 PROCEDURE — 74011000250 HC RX REV CODE- 250: Performed by: HOSPITALIST

## 2023-01-14 PROCEDURE — 94640 AIRWAY INHALATION TREATMENT: CPT

## 2023-01-14 RX ADMIN — GABAPENTIN 400 MG: 400 CAPSULE ORAL at 21:05

## 2023-01-14 RX ADMIN — FOLIC ACID 1 MG: 1 TABLET ORAL at 10:06

## 2023-01-14 RX ADMIN — GABAPENTIN 400 MG: 400 CAPSULE ORAL at 16:00

## 2023-01-14 RX ADMIN — SODIUM CHLORIDE, PRESERVATIVE FREE 10 ML: 5 INJECTION INTRAVENOUS at 21:11

## 2023-01-14 RX ADMIN — DOCUSATE SODIUM 100 MG: 100 CAPSULE, LIQUID FILLED ORAL at 10:06

## 2023-01-14 RX ADMIN — GABAPENTIN 400 MG: 400 CAPSULE ORAL at 10:06

## 2023-01-14 RX ADMIN — OXYCODONE HYDROCHLORIDE 10 MG: 5 TABLET ORAL at 05:55

## 2023-01-14 RX ADMIN — OXYCODONE HYDROCHLORIDE 10 MG: 5 TABLET ORAL at 10:06

## 2023-01-14 RX ADMIN — BUDESONIDE AND FORMOTEROL FUMARATE DIHYDRATE 1 PUFF: 160; 4.5 AEROSOL RESPIRATORY (INHALATION) at 20:54

## 2023-01-14 RX ADMIN — TIOTROPIUM BROMIDE INHALATION SPRAY 2 PUFF: 3.12 SPRAY, METERED RESPIRATORY (INHALATION) at 08:02

## 2023-01-14 RX ADMIN — DEXAMETHASONE SODIUM PHOSPHATE 8 MG: 4 INJECTION, SOLUTION INTRA-ARTICULAR; INTRALESIONAL; INTRAMUSCULAR; INTRAVENOUS; SOFT TISSUE at 05:56

## 2023-01-14 RX ADMIN — SODIUM CHLORIDE, PRESERVATIVE FREE 10 ML: 5 INJECTION INTRAVENOUS at 05:56

## 2023-01-14 RX ADMIN — CYCLOBENZAPRINE 10 MG: 10 TABLET, FILM COATED ORAL at 10:06

## 2023-01-14 RX ADMIN — DEXAMETHASONE SODIUM PHOSPHATE 8 MG: 4 INJECTION, SOLUTION INTRA-ARTICULAR; INTRALESIONAL; INTRAMUSCULAR; INTRAVENOUS; SOFT TISSUE at 15:59

## 2023-01-14 RX ADMIN — BUDESONIDE AND FORMOTEROL FUMARATE DIHYDRATE 1 PUFF: 160; 4.5 AEROSOL RESPIRATORY (INHALATION) at 08:03

## 2023-01-14 RX ADMIN — DEXAMETHASONE SODIUM PHOSPHATE 8 MG: 4 INJECTION, SOLUTION INTRA-ARTICULAR; INTRALESIONAL; INTRAMUSCULAR; INTRAVENOUS; SOFT TISSUE at 21:05

## 2023-01-14 RX ADMIN — HYDROXYZINE PAMOATE 25 MG: 25 CAPSULE ORAL at 10:06

## 2023-01-14 RX ADMIN — SODIUM CHLORIDE, PRESERVATIVE FREE 10 ML: 5 INJECTION INTRAVENOUS at 16:02

## 2023-01-14 RX ADMIN — POLYETHYLENE GLYCOL 3350 17 G: 17 POWDER, FOR SOLUTION ORAL at 21:05

## 2023-01-14 RX ADMIN — PANTOPRAZOLE SODIUM 40 MG: 40 TABLET, DELAYED RELEASE ORAL at 10:06

## 2023-01-14 RX ADMIN — OXYCODONE HYDROCHLORIDE 10 MG: 5 TABLET ORAL at 01:52

## 2023-01-14 NOTE — PROGRESS NOTES
PHYSICAL THERAPY TREATMENT  Patient: Rene Last (67 y.o. female)  Date: 1/14/2023  Diagnosis: Acute back pain [M54.9] <principal problem not specified>      Precautions:    Chart, physical therapy assessment, plan of care and goals were reviewed. ASSESSMENT  Patient continues with skilled PT services and is progressing towards goals. Pt supine in bed upon PT arrival, agreeable to session. (See below for objective details and assist levels). Overall pt tolerated session fair today with PT. Decreased assistance required for bed mobility. Maintained upright sitting t/o tx while attempting to participate with seated therex. Increased time required for all mobility secondary to back pain. Noted foot drop on RLE, no active muscle activation noted of R quad. Quick fatigue noted. Will continue to benefit from skilled PT services, and will continue to progress as tolerated. Current Level of Function Impacting Discharge (mobility/balance): Fair sitting balance. Other factors to consider for discharge: Fall risk. Pain limiting function. PLAN :  Patient continues to benefit from skilled intervention to address the above impairments. Continue treatment per established plan of care to address goals. Recommend with staff: Encourage HEP in prep for ADLs/mobility and Frequent repositioning to prevent skin breakdown    Recommendation for discharge: (in order for the patient to meet his/her long term goals)  Lisbeth Bateman.:   Patient stated I want to get better.     OBJECTIVE DATA SUMMARY:   Critical Behavior:  Neurologic State: Alert  Orientation Level: Oriented X4  Cognition: Follows commands     Functional Mobility Training:  Bed Mobility:     Supine to Sit: Minimum assistance  Sit to Supine: Minimum assistance  Scooting: Minimum assistance        Transfers:                                   Balance:  Sitting: Intact; High guard  Sitting - Static: Good (unsupported)  Sitting - Dynamic: Fair (occasional)  Ambulation/Gait Training:                                                        Stairs: Therapeutic Exercises:       EXERCISE   Sets   Reps   Active Active Assist   Passive Self ROM   Comments   Ankle Pumps 2 10 [x] [x] [] []    Quad Sets/Glut Sets   [] [] [] []    Hamstring Sets   [] [] [] []    Short Arc Quads   [] [] [] []    Heel Slides   [] [] [] []    Straight Leg Raises   [] [] [] []    Hip abd/add   [] [] [] []    Long Arc Quads 2 10 [x] [x] [] []    Marching   [] [] [] []       [] [] [] []       Pain Ratin/10 LBP    Activity Tolerance:   Fair    After treatment patient left in no apparent distress:   Bed locked and returned to lowest position, Supine in bed, Call bell within reach, and Bed / chair alarm activated    GOALS:    Problem: Mobility Impaired (Adult and Pediatric)  Goal: *Acute Goals and Plan of Care (Insert Text)  Description: FUNCTIONAL STATUS PRIOR TO ADMISSION: Patient was modified independent using a rolling walker for functional mobility. Patient required minimal assistance for basic and instrumental ADLs. HOME SUPPORT PRIOR TO ADMISSION: The patient lived alone with daughter to provide assistance. Physical Therapy Goals  Initiated 2023  Pt stated goal: to go home  Pt will be I with LE HEP in 7 days. Pt will perform bed mobility with mod I utilizing log roll technique in 7 days. Pt will perform transfers with min A in 7 days. Pt will amb 5 feet with LRAD safely with mod I in 7 days. Pt will verbalize and demonstrate compliance with spinal precautions to include no bending, lifting or twisting in 7 days. Outcome: Progressing Towards Goal       COMMUNICATION/COLLABORATION:   The patients plan of care was discussed with: Physical therapist and Occupational therapy assistant.          Avery Felder PT   Time Calculation: 40 mins

## 2023-01-14 NOTE — ROUTINE PROCESS
Verbal, bedside shift change report given to Shanti Fisher RN (oncoming nurse) by Benson Bain RN (offgoing nurse). Report included the following information from night shift events and SBAR.

## 2023-01-14 NOTE — PROGRESS NOTES
Hospitalist Progress Note    Subjective:   Daily Progress Note: 1/14/2023 9:21 AM    Hospital Course: Gely Otero 78 y.o. female history of carcinoma of the lung with lobectomy in 1996, since then on follow-up with surveillance. She woke up the morning of 12/30/2023 with severe lower extremity weakness and pain unable to stand up and walk. 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 She is recommended to follow-up with oncology, Dr. Edie Wright, had MRI and PET scan scheduled January 2023. States the back pain started getting worse, its severe, given oxycodone 10 mg 3 times a day but not helping the pain. Its radiating into the legs, now with severe weakness. She is crying with pain worse with any movement. Due to the mass in the lumbar spine requested for CT of thoracic spine and lumbar spine, consult placed to radiation oncology. As she is not completely paralyzed, radiation oncology recommended to continue steroids at this time and follow-up. Dr. Miguel Uribe, rad/onc, to start radiation treatment on 01/03. Oncology consult. Continue pain management inpatient for now. Family interested in pursuing inpatient rehab post radiation therapy. Will complete 10 treatments of radiation prior to discharge      Subjective: No acute issues overnight.      Current Facility-Administered Medications   Medication Dose Route Frequency    enoxaparin (LOVENOX) injection 40 mg  40 mg SubCUTAneous Q24H    docusate sodium (COLACE) capsule 100 mg  100 mg Oral BID PRN    oxyCODONE IR (ROXICODONE) tablet 10 mg  10 mg Oral Q4H PRN    fentaNYL (DURAGESIC) 25 mcg/hr patch 2 Patch  2 Patch TransDERmal Q72H    ALPRAZolam (XANAX) tablet 0.25 mg  0.25 mg Oral TID PRN    docusate sodium (COLACE) capsule 100 mg  100 mg Oral DAILY    cyclobenzaprine (FLEXERIL) tablet 10 mg  10 mg Oral TID PRN    polyethylene glycol (MIRALAX) packet 17 g  17 g Oral QHS    hydrOXYzine pamoate (VISTARIL) capsule 25 mg  25 mg Oral TID PRN    diphenhydrAMINE (BENADRYL) capsule 25 mg  25 mg Oral Q6H PRN    albuterol-ipratropium (DUO-NEB) 2.5 MG-0.5 MG/3 ML  3 mL Nebulization Q4H PRN    budesonide-formoteroL (SYMBICORT) 160-4.5 mcg/actuation HFA inhaler 1 Puff  1 Puff Inhalation BID RT    And    tiotropium bromide (SPIRIVA RESPIMAT) 2.5 mcg /actuation  2 Puff Inhalation DAILY    albuterol (PROVENTIL HFA, VENTOLIN HFA, PROAIR HFA) inhaler 2 Puff  2 Puff Inhalation H5W PRN    folic acid (FOLVITE) tablet 1 mg  1 mg Oral ACB    gabapentin (NEURONTIN) capsule 400 mg  400 mg Oral TID    pantoprazole (PROTONIX) tablet 40 mg  40 mg Oral ACB    sodium chloride (NS) flush 5-40 mL  5-40 mL IntraVENous Q8H    sodium chloride (NS) flush 5-40 mL  5-40 mL IntraVENous PRN    acetaminophen (TYLENOL) tablet 650 mg  650 mg Oral Q6H PRN    Or    acetaminophen (TYLENOL) suppository 650 mg  650 mg Rectal Q6H PRN    ondansetron (ZOFRAN ODT) tablet 4 mg  4 mg Oral Q6H PRN    Or    ondansetron (ZOFRAN) injection 4 mg  4 mg IntraVENous Q6H PRN    dexamethasone (DECADRON) 4 mg/mL injection 8 mg  8 mg IntraVENous Q8H        Review of Systems  Constitutional: No fevers, No chills, No sweats, ++ fatigue, ++ Weakness  Eyes: No redness  Ears, nose, mouth, throat, and face: No nasal congestion, No sore throat, No voice change  Respiratory: No Shortness of Breath, No cough, No wheezing  Cardiovascular: No chest pain, No palpitations, No extremity edema  Gastrointestinal: No nausea, No vomiting, No diarrhea, No abdominal pain  Genitourinary: No frequency, No dysuria, No hematuria  Integument/breast: No skin lesion(s)   Neurological: No Confusion, No headaches, No dizziness      Objective:     Visit Vitals  /70 (BP 1 Location: Left upper arm, BP Patient Position: At rest)   Pulse 84   Temp 98.1 °F (36.7 °C)   Resp 20   Ht 5' 5.98\" (1.676 m)   Wt 81.6 kg (180 lb)   SpO2 93%   BMI 29.07 kg/m²    O2 Flow Rate (L/min): 2 l/min O2 Device: Nasal cannula    Temp (24hrs), Av.9 °F (36.6 °C), Min:97.4 °F (36.3 °C), Max:98.3 °F (36.8 °C)      No intake/output data recorded.  1901 -  0700  In: 280 [P.O.:240; I.V.:40]  Out: 1400 [Urine:1400]    PHYSICAL EXAM:  Constitutional: No acute distress  Skin: Extremities and face reveal no rashes. HEENT: Sclerae anicteric. Extra-occular muscles are intact. No oral ulcers. The neck is supple and no masses. Cardiovascular: RRR  Respiratory:  Clear breath sounds bilaterally with no wheezes, rales, or rhonchi. GI: Abdomen nondistended, soft, and nontender. Normal active bowel sounds. Musculoskeletal: No pitting edema of the lower legs. Able to move all ext  Neurological:  Patient is alert and oriented. Cranial nerves II-XII grossly intact  Psychiatric: Mood appears appropriate       Data Review    No results found for this or any previous visit (from the past 24 hour(s)). CBC:   Lab Results   Component Value Date/Time    WBC 7.4 2023 08:36 AM    RBC 4.24 2023 08:36 AM    HGB 13.0 2023 08:36 AM    HCT 40.6 2023 08:36 AM    PLATELET 620  08:36 AM     BMP:   Lab Results   Component Value Date/Time    Glucose 112 (H) 2023 08:36 AM    Sodium 136 2023 08:36 AM    Potassium 4.4 2023 08:36 AM    Chloride 97 2023 08:36 AM    CO2 33 (H) 2023 08:36 AM    BUN 28 (H) 2023 08:36 AM    Creatinine 0.50 (L) 2023 08:36 AM    Calcium 9.2 2023 08:36 AM         Assessment:   1. Severe back pain secondary to spinal mass  2. Gait disturbances with lower extremity weakness  3. 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 COPD    Plan:   1. Oncology and radiation oncology consulted. Patient will complete 10 cycles of radiation prior to discharge. Completed 9/10. Last treatment Monday. Patient on fentanyl patch, Neurontin, oxycodone, Flexeril  2.   PT and OT recommend rehab placement. Most likely will not happen until Monday. On IV Decadron  3. Oncology consulted  4. Continue Symbicort/Spiriva. Albuterol Hailer as needed    Dispo: Greater than 48 hours. Barriers include completion of radiation oncology. Discharge to IRF on Monday after completion of radiation. CODE STATUS full     DVT prophylaxis: Lovenox  Ulcer prophylaxis: Protonix    Care Plan discussed with: Patient/Family, Nurse, and     Total time spent with patient: 33 minutes.

## 2023-01-15 LAB
ANION GAP SERPL CALC-SCNC: 2 MMOL/L (ref 5–15)
BASOPHILS # BLD: 0 K/UL (ref 0–0.1)
BASOPHILS NFR BLD: 0 % (ref 0–1)
BUN SERPL-MCNC: 29 MG/DL (ref 6–20)
BUN/CREAT SERPL: 74 (ref 12–20)
CA-I BLD-MCNC: 8.4 MG/DL (ref 8.5–10.1)
CHLORIDE SERPL-SCNC: 104 MMOL/L (ref 97–108)
CO2 SERPL-SCNC: 34 MMOL/L (ref 21–32)
CREAT SERPL-MCNC: 0.39 MG/DL (ref 0.55–1.02)
DIFFERENTIAL METHOD BLD: ABNORMAL
EOSINOPHIL # BLD: 0 K/UL (ref 0–0.4)
EOSINOPHIL NFR BLD: 0 % (ref 0–7)
ERYTHROCYTE [DISTWIDTH] IN BLOOD BY AUTOMATED COUNT: 13.4 % (ref 11.5–14.5)
GLUCOSE SERPL-MCNC: 113 MG/DL (ref 65–100)
HCT VFR BLD AUTO: 38.5 % (ref 35–47)
HGB BLD-MCNC: 12 G/DL (ref 11.5–16)
IMM GRANULOCYTES # BLD AUTO: 0.1 K/UL (ref 0–0.04)
IMM GRANULOCYTES NFR BLD AUTO: 1 % (ref 0–0.5)
LYMPHOCYTES # BLD: 0.3 K/UL (ref 0.8–3.5)
LYMPHOCYTES NFR BLD: 4 % (ref 12–49)
MCH RBC QN AUTO: 30.6 PG (ref 26–34)
MCHC RBC AUTO-ENTMCNC: 31.2 G/DL (ref 30–36.5)
MCV RBC AUTO: 98.2 FL (ref 80–99)
MONOCYTES # BLD: 0.6 K/UL (ref 0–1)
MONOCYTES NFR BLD: 7 % (ref 5–13)
NEUTS SEG # BLD: 7.7 K/UL (ref 1.8–8)
NEUTS SEG NFR BLD: 88 % (ref 32–75)
NRBC # BLD: 0 K/UL (ref 0–0.01)
NRBC BLD-RTO: 0 PER 100 WBC
PLATELET # BLD AUTO: 153 K/UL (ref 150–400)
PMV BLD AUTO: 9.1 FL (ref 8.9–12.9)
POTASSIUM SERPL-SCNC: 4.3 MMOL/L (ref 3.5–5.1)
RBC # BLD AUTO: 3.92 M/UL (ref 3.8–5.2)
SODIUM SERPL-SCNC: 140 MMOL/L (ref 136–145)
WBC # BLD AUTO: 8.7 K/UL (ref 3.6–11)

## 2023-01-15 PROCEDURE — 77010033678 HC OXYGEN DAILY

## 2023-01-15 PROCEDURE — 74011000250 HC RX REV CODE- 250: Performed by: HOSPITALIST

## 2023-01-15 PROCEDURE — 74011250636 HC RX REV CODE- 250/636: Performed by: PHYSICIAN ASSISTANT

## 2023-01-15 PROCEDURE — 74011250637 HC RX REV CODE- 250/637: Performed by: INTERNAL MEDICINE

## 2023-01-15 PROCEDURE — 94640 AIRWAY INHALATION TREATMENT: CPT

## 2023-01-15 PROCEDURE — 74011250637 HC RX REV CODE- 250/637: Performed by: STUDENT IN AN ORGANIZED HEALTH CARE EDUCATION/TRAINING PROGRAM

## 2023-01-15 PROCEDURE — 80048 BASIC METABOLIC PNL TOTAL CA: CPT

## 2023-01-15 PROCEDURE — 36415 COLL VENOUS BLD VENIPUNCTURE: CPT

## 2023-01-15 PROCEDURE — 85025 COMPLETE CBC W/AUTO DIFF WBC: CPT

## 2023-01-15 PROCEDURE — 65270000029 HC RM PRIVATE

## 2023-01-15 PROCEDURE — 94761 N-INVAS EAR/PLS OXIMETRY MLT: CPT

## 2023-01-15 PROCEDURE — 74011250636 HC RX REV CODE- 250/636: Performed by: HOSPITALIST

## 2023-01-15 PROCEDURE — 74011250637 HC RX REV CODE- 250/637: Performed by: HOSPITALIST

## 2023-01-15 RX ORDER — ENOXAPARIN SODIUM 100 MG/ML
40 INJECTION SUBCUTANEOUS EVERY 24 HOURS
Status: DISCONTINUED | OUTPATIENT
Start: 2023-01-15 | End: 2023-01-18 | Stop reason: HOSPADM

## 2023-01-15 RX ADMIN — FOLIC ACID 1 MG: 1 TABLET ORAL at 06:00

## 2023-01-15 RX ADMIN — DEXAMETHASONE SODIUM PHOSPHATE 8 MG: 4 INJECTION, SOLUTION INTRA-ARTICULAR; INTRALESIONAL; INTRAMUSCULAR; INTRAVENOUS; SOFT TISSUE at 14:43

## 2023-01-15 RX ADMIN — CYCLOBENZAPRINE 10 MG: 10 TABLET, FILM COATED ORAL at 06:04

## 2023-01-15 RX ADMIN — TIOTROPIUM BROMIDE INHALATION SPRAY 2 PUFF: 3.12 SPRAY, METERED RESPIRATORY (INHALATION) at 10:14

## 2023-01-15 RX ADMIN — OXYCODONE HYDROCHLORIDE 10 MG: 5 TABLET ORAL at 18:27

## 2023-01-15 RX ADMIN — POLYETHYLENE GLYCOL 3350 17 G: 17 POWDER, FOR SOLUTION ORAL at 21:32

## 2023-01-15 RX ADMIN — BUDESONIDE AND FORMOTEROL FUMARATE DIHYDRATE 1 PUFF: 160; 4.5 AEROSOL RESPIRATORY (INHALATION) at 22:14

## 2023-01-15 RX ADMIN — OXYCODONE HYDROCHLORIDE 10 MG: 5 TABLET ORAL at 22:54

## 2023-01-15 RX ADMIN — GABAPENTIN 400 MG: 400 CAPSULE ORAL at 10:09

## 2023-01-15 RX ADMIN — PANTOPRAZOLE SODIUM 40 MG: 40 TABLET, DELAYED RELEASE ORAL at 06:00

## 2023-01-15 RX ADMIN — BUDESONIDE AND FORMOTEROL FUMARATE DIHYDRATE 1 PUFF: 160; 4.5 AEROSOL RESPIRATORY (INHALATION) at 10:15

## 2023-01-15 RX ADMIN — SODIUM CHLORIDE, PRESERVATIVE FREE 10 ML: 5 INJECTION INTRAVENOUS at 21:32

## 2023-01-15 RX ADMIN — GABAPENTIN 400 MG: 400 CAPSULE ORAL at 16:00

## 2023-01-15 RX ADMIN — DOCUSATE SODIUM 100 MG: 100 CAPSULE, LIQUID FILLED ORAL at 10:09

## 2023-01-15 RX ADMIN — OXYCODONE HYDROCHLORIDE 10 MG: 5 TABLET ORAL at 01:51

## 2023-01-15 RX ADMIN — DEXAMETHASONE SODIUM PHOSPHATE 8 MG: 4 INJECTION, SOLUTION INTRA-ARTICULAR; INTRALESIONAL; INTRAMUSCULAR; INTRAVENOUS; SOFT TISSUE at 06:00

## 2023-01-15 RX ADMIN — OXYCODONE HYDROCHLORIDE 10 MG: 5 TABLET ORAL at 10:09

## 2023-01-15 RX ADMIN — OXYCODONE HYDROCHLORIDE 10 MG: 5 TABLET ORAL at 06:04

## 2023-01-15 RX ADMIN — OXYCODONE HYDROCHLORIDE 10 MG: 5 TABLET ORAL at 14:43

## 2023-01-15 RX ADMIN — ENOXAPARIN SODIUM 40 MG: 100 INJECTION SUBCUTANEOUS at 01:56

## 2023-01-15 RX ADMIN — SODIUM CHLORIDE, PRESERVATIVE FREE 10 ML: 5 INJECTION INTRAVENOUS at 06:39

## 2023-01-15 RX ADMIN — GABAPENTIN 400 MG: 400 CAPSULE ORAL at 21:32

## 2023-01-15 RX ADMIN — DEXAMETHASONE SODIUM PHOSPHATE 8 MG: 4 INJECTION, SOLUTION INTRA-ARTICULAR; INTRALESIONAL; INTRAMUSCULAR; INTRAVENOUS; SOFT TISSUE at 21:32

## 2023-01-15 RX ADMIN — SODIUM CHLORIDE, PRESERVATIVE FREE 10 ML: 5 INJECTION INTRAVENOUS at 15:55

## 2023-01-15 NOTE — PROGRESS NOTES
Problem: Falls - Risk of  Goal: *Absence of Falls  Description: Document Ben Crowder Fall Risk and appropriate interventions in the flowsheet. Note: Fall Risk Interventions:  Mobility Interventions: Bed/chair exit alarm, Patient to call before getting OOB, PT Consult for mobility concerns, PT Consult for assist device competence         Medication Interventions: Bed/chair exit alarm, Patient to call before getting OOB    Elimination Interventions: Bed/chair exit alarm, Call light in reach, Patient to call for help with toileting needs              Problem: Pressure Injury - Risk of  Goal: *Prevention of pressure injury  Description: Document Al Scale and appropriate interventions in the flowsheet. Note: Pressure Injury Interventions:  Sensory Interventions: Assess changes in LOC    Moisture Interventions: Internal/External urinary devices    Activity Interventions: PT/OT evaluation    Mobility Interventions: Turn and reposition approx.  every two hours(pillow and wedges)    Nutrition Interventions: Document food/fluid/supplement intake    Friction and Shear Interventions: Minimize layers

## 2023-01-15 NOTE — PROGRESS NOTES
Hospitalist Progress Note    Subjective:   Daily Progress Note: 1/15/2023 9:21 AM    Hospital Course: Gely Otero 78 y.o. female history of carcinoma of the lung with lobectomy in 1996, since then on follow-up with surveillance. She woke up the morning of 12/30/2023 with severe lower extremity weakness and pain unable to stand up and walk. 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 She is recommended to follow-up with oncology, Dr. Andrew Schneider, had MRI and PET scan scheduled January 2023. States the back pain started getting worse, its severe, given oxycodone 10 mg 3 times a day but not helping the pain. Its radiating into the legs, now with severe weakness. She is crying with pain worse with any movement. Due to the mass in the lumbar spine requested for CT of thoracic spine and lumbar spine, consult placed to radiation oncology. As she is not completely paralyzed, radiation oncology recommended to continue steroids at this time and follow-up. Dr. Reagan Paulino, rad/onc, to start radiation treatment on 01/03. Oncology consult. Continue pain management inpatient for now. Family interested in pursuing inpatient rehab post radiation therapy. Will complete 10 treatments of radiation prior to discharge      Subjective: No new acute complaints.  Sitting up in bed eating breakfast.      Current Facility-Administered Medications   Medication Dose Route Frequency    enoxaparin (LOVENOX) injection 40 mg  40 mg SubCUTAneous Q24H    docusate sodium (COLACE) capsule 100 mg  100 mg Oral BID PRN    oxyCODONE IR (ROXICODONE) tablet 10 mg  10 mg Oral Q4H PRN    fentaNYL (DURAGESIC) 25 mcg/hr patch 2 Patch  2 Patch TransDERmal Q72H    ALPRAZolam (XANAX) tablet 0.25 mg  0.25 mg Oral TID PRN    docusate sodium (COLACE) capsule 100 mg  100 mg Oral DAILY    cyclobenzaprine (FLEXERIL) tablet 10 mg  10 mg Oral TID PRN    polyethylene glycol (MIRALAX) packet 17 g  17 g Oral QHS    hydrOXYzine pamoate (VISTARIL) capsule 25 mg  25 mg Oral TID PRN    diphenhydrAMINE (BENADRYL) capsule 25 mg  25 mg Oral Q6H PRN    albuterol-ipratropium (DUO-NEB) 2.5 MG-0.5 MG/3 ML  3 mL Nebulization Q4H PRN    budesonide-formoteroL (SYMBICORT) 160-4.5 mcg/actuation HFA inhaler 1 Puff  1 Puff Inhalation BID RT    And    tiotropium bromide (SPIRIVA RESPIMAT) 2.5 mcg /actuation  2 Puff Inhalation DAILY    albuterol (PROVENTIL HFA, VENTOLIN HFA, PROAIR HFA) inhaler 2 Puff  2 Puff Inhalation M2A PRN    folic acid (FOLVITE) tablet 1 mg  1 mg Oral ACB    gabapentin (NEURONTIN) capsule 400 mg  400 mg Oral TID    pantoprazole (PROTONIX) tablet 40 mg  40 mg Oral ACB    sodium chloride (NS) flush 5-40 mL  5-40 mL IntraVENous Q8H    sodium chloride (NS) flush 5-40 mL  5-40 mL IntraVENous PRN    acetaminophen (TYLENOL) tablet 650 mg  650 mg Oral Q6H PRN    Or    acetaminophen (TYLENOL) suppository 650 mg  650 mg Rectal Q6H PRN    ondansetron (ZOFRAN ODT) tablet 4 mg  4 mg Oral Q6H PRN    Or    ondansetron (ZOFRAN) injection 4 mg  4 mg IntraVENous Q6H PRN    dexamethasone (DECADRON) 4 mg/mL injection 8 mg  8 mg IntraVENous Q8H        Review of Systems  Constitutional: No fevers, No chills, No sweats, ++ fatigue, ++ Weakness  Eyes: No redness  Ears, nose, mouth, throat, and face: No nasal congestion, No sore throat, No voice change  Respiratory: No Shortness of Breath, No cough, No wheezing  Cardiovascular: No chest pain, No palpitations, No extremity edema  Gastrointestinal: No nausea, No vomiting, No diarrhea, No abdominal pain  Genitourinary: No frequency, No dysuria, No hematuria  Integument/breast: No skin lesion(s)   Neurological: No Confusion, No headaches, No dizziness      Objective:     Visit Vitals  /70   Pulse 66   Temp 97.7 °F (36.5 °C)   Resp 20   Ht 5' 5.98\" (1.676 m)   Wt 81.6 kg (180 lb)   SpO2 96%   BMI 29.07 kg/m²    O2 Flow Rate (L/min): 2 l/min O2 Device: Nasal cannula    Temp (24hrs), Av.1 °F (36.7 °C), Min:97.7 °F (36.5 °C), Max:98.5 °F (36.9 °C)      No intake/output data recorded.  1901 - 01/15 0700  In: 160 [P.O.:120; I.V.:40]  Out: 550 [Urine:550]    PHYSICAL EXAM:  Constitutional: No acute distress  Skin: Extremities and face reveal no rashes. HEENT: Sclerae anicteric. Extra-occular muscles are intact. Cardiovascular: RRR  Respiratory:  Clear breath sounds bilaterally with no wheezes, rales, or rhonchi. GI: Abdomen nondistended, soft, and nontender. Normal active bowel sounds. Musculoskeletal: No pitting edema of the lower legs. Able to move all ext  Neurological:  Patient is alert and oriented. Cranial nerves II-XII grossly intact  Psychiatric: Mood appears appropriate       Data Review    Recent Results (from the past 24 hour(s))   CBC WITH AUTOMATED DIFF    Collection Time: 01/15/23  8:16 AM   Result Value Ref Range    WBC 8.7 3.6 - 11.0 K/uL    RBC 3.92 3.80 - 5.20 M/uL    HGB 12.0 11.5 - 16.0 g/dL    HCT 38.5 35.0 - 47.0 %    MCV 98.2 80.0 - 99.0 FL    MCH 30.6 26.0 - 34.0 PG    MCHC 31.2 30.0 - 36.5 g/dL    RDW 13.4 11.5 - 14.5 %    PLATELET 162 459 - 329 K/uL    MPV 9.1 8.9 - 12.9 FL    NRBC 0.0 0.0  WBC    ABSOLUTE NRBC 0.00 0.00 - 0.01 K/uL    NEUTROPHILS 88 (H) 32 - 75 %    LYMPHOCYTES 4 (L) 12 - 49 %    MONOCYTES 7 5 - 13 %    EOSINOPHILS 0 0 - 7 %    BASOPHILS 0 0 - 1 %    IMMATURE GRANULOCYTES 1 (H) 0 - 0.5 %    ABS. NEUTROPHILS 7.7 1.8 - 8.0 K/UL    ABS. LYMPHOCYTES 0.3 (L) 0.8 - 3.5 K/UL    ABS. MONOCYTES 0.6 0.0 - 1.0 K/UL    ABS. EOSINOPHILS 0.0 0.0 - 0.4 K/UL    ABS. BASOPHILS 0.0 0.0 - 0.1 K/UL    ABS. IMM.  GRANS. 0.1 (H) 0.00 - 0.04 K/UL    DF AUTOMATED         CBC:   Lab Results   Component Value Date/Time    WBC 8.7 01/15/2023 08:16 AM    RBC 3.92 01/15/2023 08:16 AM    HGB 12.0 01/15/2023 08:16 AM    HCT 38.5 01/15/2023 08:16 AM    PLATELET 091  08:16 AM     BMP:   Lab Results Component Value Date/Time    Glucose 112 (H) 01/06/2023 08:36 AM    Sodium 136 01/06/2023 08:36 AM    Potassium 4.4 01/06/2023 08:36 AM    Chloride 97 01/06/2023 08:36 AM    CO2 33 (H) 01/06/2023 08:36 AM    BUN 28 (H) 01/06/2023 08:36 AM    Creatinine 0.50 (L) 01/06/2023 08:36 AM    Calcium 9.2 01/06/2023 08:36 AM         Assessment:   1. Severe back pain secondary to spinal mass  2. Gait disturbances with lower extremity weakness  3. 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 COPD    Plan:   1. Oncology and radiation oncology consulted. Patient will complete 10 cycles of radiation prior to discharge. Completed 9/10. Last treatment Monday. Patient on fentanyl patch, Neurontin, oxycodone, Flexeril  2. PT and OT recommend rehab placement. Most likely will not happen until Monday. On IV Decadron  3. Oncology consulted  4. Continue Symbicort/Spiriva. Albuterol Hailer as needed    Dispo: 24-48 hours. Barriers include completion of radiation oncology. Discharge to IRF on Monday after completion of radiation if bed available. CODE STATUS full     DVT prophylaxis: Lovenox  Ulcer prophylaxis: Protonix    Care Plan discussed with: Patient/Family, Nurse, and     Total time spent with patient: 34 minutes.

## 2023-01-16 ENCOUNTER — HOSPITAL ENCOUNTER (INPATIENT)
Dept: RADIATION THERAPY | Age: 83
DRG: 055 | End: 2023-01-16
Payer: MEDICARE

## 2023-01-16 PROCEDURE — 74011250637 HC RX REV CODE- 250/637: Performed by: INTERNAL MEDICINE

## 2023-01-16 PROCEDURE — 77417 THER RADIOLOGY PORT IMAGE(S): CPT

## 2023-01-16 PROCEDURE — 65270000029 HC RM PRIVATE

## 2023-01-16 PROCEDURE — 74011250636 HC RX REV CODE- 250/636: Performed by: HOSPITALIST

## 2023-01-16 PROCEDURE — 74011000250 HC RX REV CODE- 250: Performed by: HOSPITALIST

## 2023-01-16 PROCEDURE — 74011250637 HC RX REV CODE- 250/637: Performed by: HOSPITALIST

## 2023-01-16 PROCEDURE — 77336 RADIATION PHYSICS CONSULT: CPT

## 2023-01-16 PROCEDURE — 74011250637 HC RX REV CODE- 250/637: Performed by: STUDENT IN AN ORGANIZED HEALTH CARE EDUCATION/TRAINING PROGRAM

## 2023-01-16 PROCEDURE — 74011250636 HC RX REV CODE- 250/636: Performed by: PHYSICIAN ASSISTANT

## 2023-01-16 PROCEDURE — 94640 AIRWAY INHALATION TREATMENT: CPT

## 2023-01-16 PROCEDURE — 77010033678 HC OXYGEN DAILY

## 2023-01-16 PROCEDURE — 77412 RADIATION TX DELIVERY LVL 3: CPT

## 2023-01-16 PROCEDURE — 94761 N-INVAS EAR/PLS OXIMETRY MLT: CPT

## 2023-01-16 RX ADMIN — DEXAMETHASONE SODIUM PHOSPHATE 8 MG: 4 INJECTION, SOLUTION INTRA-ARTICULAR; INTRALESIONAL; INTRAMUSCULAR; INTRAVENOUS; SOFT TISSUE at 05:42

## 2023-01-16 RX ADMIN — DOCUSATE SODIUM 100 MG: 100 CAPSULE, LIQUID FILLED ORAL at 08:22

## 2023-01-16 RX ADMIN — OXYCODONE HYDROCHLORIDE 10 MG: 5 TABLET ORAL at 14:15

## 2023-01-16 RX ADMIN — FOLIC ACID 1 MG: 1 TABLET ORAL at 08:22

## 2023-01-16 RX ADMIN — SODIUM CHLORIDE, PRESERVATIVE FREE 10 ML: 5 INJECTION INTRAVENOUS at 21:20

## 2023-01-16 RX ADMIN — DEXAMETHASONE SODIUM PHOSPHATE 8 MG: 4 INJECTION, SOLUTION INTRA-ARTICULAR; INTRALESIONAL; INTRAMUSCULAR; INTRAVENOUS; SOFT TISSUE at 15:27

## 2023-01-16 RX ADMIN — SODIUM CHLORIDE, PRESERVATIVE FREE 10 ML: 5 INJECTION INTRAVENOUS at 05:42

## 2023-01-16 RX ADMIN — PANTOPRAZOLE SODIUM 40 MG: 40 TABLET, DELAYED RELEASE ORAL at 08:22

## 2023-01-16 RX ADMIN — SODIUM CHLORIDE, PRESERVATIVE FREE 10 ML: 5 INJECTION INTRAVENOUS at 08:35

## 2023-01-16 RX ADMIN — DEXAMETHASONE SODIUM PHOSPHATE 8 MG: 4 INJECTION, SOLUTION INTRA-ARTICULAR; INTRALESIONAL; INTRAMUSCULAR; INTRAVENOUS; SOFT TISSUE at 21:20

## 2023-01-16 RX ADMIN — GABAPENTIN 400 MG: 400 CAPSULE ORAL at 15:27

## 2023-01-16 RX ADMIN — ENOXAPARIN SODIUM 40 MG: 100 INJECTION SUBCUTANEOUS at 01:44

## 2023-01-16 RX ADMIN — POLYETHYLENE GLYCOL 3350 17 G: 17 POWDER, FOR SOLUTION ORAL at 21:20

## 2023-01-16 RX ADMIN — GABAPENTIN 400 MG: 400 CAPSULE ORAL at 21:19

## 2023-01-16 RX ADMIN — BUDESONIDE AND FORMOTEROL FUMARATE DIHYDRATE 1 PUFF: 160; 4.5 AEROSOL RESPIRATORY (INHALATION) at 07:59

## 2023-01-16 RX ADMIN — DOCUSATE SODIUM 100 MG: 100 CAPSULE, LIQUID FILLED ORAL at 21:29

## 2023-01-16 RX ADMIN — OXYCODONE HYDROCHLORIDE 10 MG: 5 TABLET ORAL at 21:19

## 2023-01-16 RX ADMIN — BUDESONIDE AND FORMOTEROL FUMARATE DIHYDRATE 1 PUFF: 160; 4.5 AEROSOL RESPIRATORY (INHALATION) at 20:00

## 2023-01-16 RX ADMIN — TIOTROPIUM BROMIDE INHALATION SPRAY 2 PUFF: 3.12 SPRAY, METERED RESPIRATORY (INHALATION) at 07:59

## 2023-01-16 RX ADMIN — GABAPENTIN 400 MG: 400 CAPSULE ORAL at 08:22

## 2023-01-16 NOTE — PROGRESS NOTES
Hospitalist Progress Note    Subjective:   Daily Progress Note: 1/16/2023 9:21 AM    Hospital Course: Gely Otero 78 y.o. female history of carcinoma of the lung with lobectomy in 1996, since then on follow-up with surveillance. She woke up the morning of 12/30/2023 with severe lower extremity weakness and pain unable to stand up and walk. 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 She is recommended to follow-up with oncology, Dr. Yosi Paul, had MRI and PET scan scheduled January 2023. States the back pain started getting worse, its severe, given oxycodone 10 mg 3 times a day but not helping the pain. Its radiating into the legs, now with severe weakness. She is crying with pain worse with any movement. Due to the mass in the lumbar spine requested for CT of thoracic spine and lumbar spine, consult placed to radiation oncology. As she is not completely paralyzed, radiation oncology recommended to continue steroids at this time and follow-up. Dr. Nereyda Jung, rad/onc, to start radiation treatment on 01/03. Oncology consult. Continue pain management inpatient for now. Family interested in pursuing inpatient rehab post radiation therapy. Will complete 10 treatments of radiation prior to discharge      Subjective: No new acute complaints.     Current Facility-Administered Medications   Medication Dose Route Frequency    enoxaparin (LOVENOX) injection 40 mg  40 mg SubCUTAneous Q24H    docusate sodium (COLACE) capsule 100 mg  100 mg Oral BID PRN    oxyCODONE IR (ROXICODONE) tablet 10 mg  10 mg Oral Q4H PRN    fentaNYL (DURAGESIC) 25 mcg/hr patch 2 Patch  2 Patch TransDERmal Q72H    ALPRAZolam (XANAX) tablet 0.25 mg  0.25 mg Oral TID PRN    docusate sodium (COLACE) capsule 100 mg  100 mg Oral DAILY    cyclobenzaprine (FLEXERIL) tablet 10 mg  10 mg Oral TID PRN    polyethylene glycol (MIRALAX) packet 17 g  17 g Oral QHS    hydrOXYzine pamoate (VISTARIL) capsule 25 mg  25 mg Oral TID PRN    diphenhydrAMINE (BENADRYL) capsule 25 mg  25 mg Oral Q6H PRN    albuterol-ipratropium (DUO-NEB) 2.5 MG-0.5 MG/3 ML  3 mL Nebulization Q4H PRN    budesonide-formoteroL (SYMBICORT) 160-4.5 mcg/actuation HFA inhaler 1 Puff  1 Puff Inhalation BID RT    And    tiotropium bromide (SPIRIVA RESPIMAT) 2.5 mcg /actuation  2 Puff Inhalation DAILY    albuterol (PROVENTIL HFA, VENTOLIN HFA, PROAIR HFA) inhaler 2 Puff  2 Puff Inhalation X5X PRN    folic acid (FOLVITE) tablet 1 mg  1 mg Oral ACB    gabapentin (NEURONTIN) capsule 400 mg  400 mg Oral TID    pantoprazole (PROTONIX) tablet 40 mg  40 mg Oral ACB    sodium chloride (NS) flush 5-40 mL  5-40 mL IntraVENous Q8H    sodium chloride (NS) flush 5-40 mL  5-40 mL IntraVENous PRN    acetaminophen (TYLENOL) tablet 650 mg  650 mg Oral Q6H PRN    Or    acetaminophen (TYLENOL) suppository 650 mg  650 mg Rectal Q6H PRN    ondansetron (ZOFRAN ODT) tablet 4 mg  4 mg Oral Q6H PRN    Or    ondansetron (ZOFRAN) injection 4 mg  4 mg IntraVENous Q6H PRN    dexamethasone (DECADRON) 4 mg/mL injection 8 mg  8 mg IntraVENous Q8H        Review of Systems  Constitutional: No fevers, No chills, No sweats, ++ fatigue, ++ Weakness  Eyes: No redness  Ears, nose, mouth, throat, and face: No nasal congestion, No sore throat, No voice change  Respiratory: No Shortness of Breath, No cough, No wheezing  Cardiovascular: No chest pain, No palpitations, No extremity edema  Gastrointestinal: No nausea, No vomiting, No diarrhea, No abdominal pain  Genitourinary: No frequency, No dysuria, No hematuria  Integument/breast: No skin lesion(s)   Neurological: No Confusion, No headaches, No dizziness      Objective:     Visit Vitals  /66 (BP 1 Location: Left upper arm, BP Patient Position: At rest)   Pulse 65   Temp 97.5 °F (36.4 °C)   Resp 17   Ht 5' 5.98\" (1.676 m)   Wt 81.6 kg (180 lb)   SpO2 95%   BMI 29.07 kg/m²    O2 Flow Rate (L/min): 2 l/min O2 Device: Nasal cannula    Temp (24hrs), Av.9 °F (36.6 °C), Min:97.5 °F (36.4 °C), Max:98.2 °F (36.8 °C)      No intake/output data recorded. 1 -  0700  In: 600 [P.O.:600]  Out: 1000 [Urine:1000]    PHYSICAL EXAM:  Constitutional: No acute distress  Skin: Extremities and face reveal no rashes. HEENT: Sclerae anicteric. Extra-occular muscles are intact. Cardiovascular: RRR  Respiratory:  Clear breath sounds bilaterally with no wheezes, rales, or rhonchi. GI: Abdomen nondistended, soft, and nontender. Normal active bowel sounds. Musculoskeletal: No pitting edema of the lower legs. Able to move all ext  Neurological:  Patient is alert and oriented. Cranial nerves II-XII grossly intact  Psychiatric: Mood appears appropriate       Data Review    No results found for this or any previous visit (from the past 24 hour(s)). CBC:   Lab Results   Component Value Date/Time    WBC 8.7 01/15/2023 08:16 AM    RBC 3.92 01/15/2023 08:16 AM    HGB 12.0 01/15/2023 08:16 AM    HCT 38.5 01/15/2023 08:16 AM    PLATELET 355  08:16 AM     BMP:   Lab Results   Component Value Date/Time    Glucose 113 (H) 01/15/2023 08:16 AM    Sodium 140 01/15/2023 08:16 AM    Potassium 4.3 01/15/2023 08:16 AM    Chloride 104 01/15/2023 08:16 AM    CO2 34 (H) 01/15/2023 08:16 AM    BUN 29 (H) 01/15/2023 08:16 AM    Creatinine 0.39 (L) 01/15/2023 08:16 AM    Calcium 8.4 (L) 01/15/2023 08:16 AM         Assessment/Plan   Severe back pain secondary to spinal mass  --Oncology and radiation oncology consulted. --Patient will complete 10 cycles of radiation prior to discharge. --Completed 10/10. Last treatment 2023. --Patient on fentanyl patch, Neurontin, oxycodone, Flexeril    Gait disturbances with lower extremity weakness  --PT and OT recommend rehab placement. --Most likely will not happen until Monday.     --On IV Decadron    History of carcinoma of the lung adenocarcinoma with lobectomy  --Oncology consulted    COPD  --Continue Symbicort/Spiriva. Albuterol inhaler as needed    Dispo: 24-48 hours. Barriers include completion of radiation oncology. Discharge to IRF on Monday after completion of radiation if bed available. CODE STATUS full     DVT prophylaxis: Lovenox  Ulcer prophylaxis: Protonix    Care Plan discussed with: Patient/Family, Nurse, and     Total time spent with patient: 35 minutes.

## 2023-01-16 NOTE — PROGRESS NOTES
CM reviewed chart. Patient to finish radiation treatment today at [facility]. After last dose of radiation today, patient should be stable for discharge to IRF at [facility] and rehab. Per liaison at [facility], they do not have any beds available today, but will have 3 tomorrow. Informed them of likely discharge today and to see if we can have one of the 3 beds tomorrow for this patient tomorrow. Waiting for response.

## 2023-01-16 NOTE — PROGRESS NOTES
Bedside, Verbal, and Written shift change report given to Subha Rasmussen RN (oncoming nurse) by Kate Grullon RN (offgoing nurse). Report included the following information SBAR. Imiquimod Pregnancy And Lactation Text: This medication is Pregnancy Category C. It is unknown if this medication is excreted in breast milk.

## 2023-01-17 LAB
ANION GAP SERPL CALC-SCNC: 1 MMOL/L (ref 5–15)
BUN SERPL-MCNC: 31 MG/DL (ref 6–20)
BUN/CREAT SERPL: 63 (ref 12–20)
CA-I BLD-MCNC: 8.4 MG/DL (ref 8.5–10.1)
CHLORIDE SERPL-SCNC: 105 MMOL/L (ref 97–108)
CO2 SERPL-SCNC: 35 MMOL/L (ref 21–32)
CREAT SERPL-MCNC: 0.49 MG/DL (ref 0.55–1.02)
GLUCOSE SERPL-MCNC: 134 MG/DL (ref 65–100)
POTASSIUM SERPL-SCNC: 4.4 MMOL/L (ref 3.5–5.1)
SODIUM SERPL-SCNC: 141 MMOL/L (ref 136–145)

## 2023-01-17 PROCEDURE — 36415 COLL VENOUS BLD VENIPUNCTURE: CPT

## 2023-01-17 PROCEDURE — 74011250637 HC RX REV CODE- 250/637: Performed by: HOSPITALIST

## 2023-01-17 PROCEDURE — 94640 AIRWAY INHALATION TREATMENT: CPT

## 2023-01-17 PROCEDURE — 94761 N-INVAS EAR/PLS OXIMETRY MLT: CPT

## 2023-01-17 PROCEDURE — 74011250636 HC RX REV CODE- 250/636: Performed by: HOSPITALIST

## 2023-01-17 PROCEDURE — 74011250636 HC RX REV CODE- 250/636: Performed by: PHYSICIAN ASSISTANT

## 2023-01-17 PROCEDURE — 80048 BASIC METABOLIC PNL TOTAL CA: CPT

## 2023-01-17 PROCEDURE — 97530 THERAPEUTIC ACTIVITIES: CPT

## 2023-01-17 PROCEDURE — 77010033678 HC OXYGEN DAILY

## 2023-01-17 PROCEDURE — 74011250637 HC RX REV CODE- 250/637: Performed by: STUDENT IN AN ORGANIZED HEALTH CARE EDUCATION/TRAINING PROGRAM

## 2023-01-17 PROCEDURE — 74011250637 HC RX REV CODE- 250/637: Performed by: INTERNAL MEDICINE

## 2023-01-17 PROCEDURE — 74011000250 HC RX REV CODE- 250: Performed by: HOSPITALIST

## 2023-01-17 PROCEDURE — 65270000029 HC RM PRIVATE

## 2023-01-17 RX ADMIN — DEXAMETHASONE SODIUM PHOSPHATE 8 MG: 4 INJECTION, SOLUTION INTRA-ARTICULAR; INTRALESIONAL; INTRAMUSCULAR; INTRAVENOUS; SOFT TISSUE at 17:48

## 2023-01-17 RX ADMIN — DOCUSATE SODIUM 100 MG: 100 CAPSULE, LIQUID FILLED ORAL at 09:15

## 2023-01-17 RX ADMIN — PANTOPRAZOLE SODIUM 40 MG: 40 TABLET, DELAYED RELEASE ORAL at 09:15

## 2023-01-17 RX ADMIN — SODIUM CHLORIDE, PRESERVATIVE FREE 10 ML: 5 INJECTION INTRAVENOUS at 17:48

## 2023-01-17 RX ADMIN — FOLIC ACID 1 MG: 1 TABLET ORAL at 09:15

## 2023-01-17 RX ADMIN — OXYCODONE HYDROCHLORIDE 10 MG: 5 TABLET ORAL at 21:52

## 2023-01-17 RX ADMIN — SODIUM CHLORIDE, PRESERVATIVE FREE 10 ML: 5 INJECTION INTRAVENOUS at 21:53

## 2023-01-17 RX ADMIN — DEXAMETHASONE SODIUM PHOSPHATE 8 MG: 4 INJECTION, SOLUTION INTRA-ARTICULAR; INTRALESIONAL; INTRAMUSCULAR; INTRAVENOUS; SOFT TISSUE at 05:22

## 2023-01-17 RX ADMIN — GABAPENTIN 400 MG: 400 CAPSULE ORAL at 17:48

## 2023-01-17 RX ADMIN — GABAPENTIN 400 MG: 400 CAPSULE ORAL at 09:15

## 2023-01-17 RX ADMIN — TIOTROPIUM BROMIDE INHALATION SPRAY 2 PUFF: 3.12 SPRAY, METERED RESPIRATORY (INHALATION) at 07:45

## 2023-01-17 RX ADMIN — DEXAMETHASONE SODIUM PHOSPHATE 8 MG: 4 INJECTION, SOLUTION INTRA-ARTICULAR; INTRALESIONAL; INTRAMUSCULAR; INTRAVENOUS; SOFT TISSUE at 21:52

## 2023-01-17 RX ADMIN — CYCLOBENZAPRINE 10 MG: 10 TABLET, FILM COATED ORAL at 17:48

## 2023-01-17 RX ADMIN — POLYETHYLENE GLYCOL 3350 17 G: 17 POWDER, FOR SOLUTION ORAL at 21:52

## 2023-01-17 RX ADMIN — SODIUM CHLORIDE, PRESERVATIVE FREE 10 ML: 5 INJECTION INTRAVENOUS at 05:22

## 2023-01-17 RX ADMIN — OXYCODONE HYDROCHLORIDE 10 MG: 5 TABLET ORAL at 12:49

## 2023-01-17 RX ADMIN — BUDESONIDE AND FORMOTEROL FUMARATE DIHYDRATE 1 PUFF: 160; 4.5 AEROSOL RESPIRATORY (INHALATION) at 07:45

## 2023-01-17 RX ADMIN — ENOXAPARIN SODIUM 40 MG: 100 INJECTION SUBCUTANEOUS at 02:25

## 2023-01-17 RX ADMIN — GABAPENTIN 400 MG: 400 CAPSULE ORAL at 21:52

## 2023-01-17 RX ADMIN — BUDESONIDE AND FORMOTEROL FUMARATE DIHYDRATE 1 PUFF: 160; 4.5 AEROSOL RESPIRATORY (INHALATION) at 20:11

## 2023-01-17 NOTE — PROGRESS NOTES
Hospitalist Progress Note    Subjective:   Daily Progress Note: 1/17/2023 9:21 AM    Hospital Course: Gely Otero 78 y.o. female history of carcinoma of the lung with lobectomy in 1996, since then on follow-up with surveillance. She woke up the morning of 12/30/2023 with severe lower extremity weakness and pain unable to stand up and walk. 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 She is recommended to follow-up with oncology, Dr. Kev Alexandre, had MRI and PET scan scheduled January 2023. States the back pain started getting worse, its severe, given oxycodone 10 mg 3 times a day but not helping the pain. Its radiating into the legs, now with severe weakness. She is crying with pain worse with any movement. Due to the mass in the lumbar spine requested for CT of thoracic spine and lumbar spine, consult placed to radiation oncology. As she is not completely paralyzed, radiation oncology recommended to continue steroids at this time and follow-up. Dr. Eliz Harris, rad/onc, to start radiation treatment on 01/03. Oncology consult. Continue pain management inpatient for now. Family interested in pursuing inpatient rehab post radiation therapy. Will complete 10 treatments of radiation prior to discharge. Discharge delayed secondary to placement issues at [facility]. Subjective: No new acute complaints. Notes improvement still has some pain.     Current Facility-Administered Medications   Medication Dose Route Frequency    enoxaparin (LOVENOX) injection 40 mg  40 mg SubCUTAneous Q24H    docusate sodium (COLACE) capsule 100 mg  100 mg Oral BID PRN    oxyCODONE IR (ROXICODONE) tablet 10 mg  10 mg Oral Q4H PRN    fentaNYL (DURAGESIC) 25 mcg/hr patch 2 Patch  2 Patch TransDERmal Q72H    ALPRAZolam (XANAX) tablet 0.25 mg  0.25 mg Oral TID PRN    docusate sodium (COLACE) capsule 100 mg  100 mg Oral DAILY    cyclobenzaprine (FLEXERIL) tablet 10 mg  10 mg Oral TID PRN    polyethylene glycol (MIRALAX) packet 17 g  17 g Oral QHS    hydrOXYzine pamoate (VISTARIL) capsule 25 mg  25 mg Oral TID PRN    diphenhydrAMINE (BENADRYL) capsule 25 mg  25 mg Oral Q6H PRN    albuterol-ipratropium (DUO-NEB) 2.5 MG-0.5 MG/3 ML  3 mL Nebulization Q4H PRN    budesonide-formoteroL (SYMBICORT) 160-4.5 mcg/actuation HFA inhaler 1 Puff  1 Puff Inhalation BID RT    And    tiotropium bromide (SPIRIVA RESPIMAT) 2.5 mcg /actuation  2 Puff Inhalation DAILY    albuterol (PROVENTIL HFA, VENTOLIN HFA, PROAIR HFA) inhaler 2 Puff  2 Puff Inhalation D9N PRN    folic acid (FOLVITE) tablet 1 mg  1 mg Oral ACB    gabapentin (NEURONTIN) capsule 400 mg  400 mg Oral TID    pantoprazole (PROTONIX) tablet 40 mg  40 mg Oral ACB    sodium chloride (NS) flush 5-40 mL  5-40 mL IntraVENous Q8H    sodium chloride (NS) flush 5-40 mL  5-40 mL IntraVENous PRN    acetaminophen (TYLENOL) tablet 650 mg  650 mg Oral Q6H PRN    Or    acetaminophen (TYLENOL) suppository 650 mg  650 mg Rectal Q6H PRN    ondansetron (ZOFRAN ODT) tablet 4 mg  4 mg Oral Q6H PRN    Or    ondansetron (ZOFRAN) injection 4 mg  4 mg IntraVENous Q6H PRN    dexamethasone (DECADRON) 4 mg/mL injection 8 mg  8 mg IntraVENous Q8H        Review of Systems  Constitutional: No fevers, No chills, No sweats, ++ fatigue, ++ Weakness  Eyes: No redness  Ears, nose, mouth, throat, and face: No nasal congestion, No sore throat, No voice change  Respiratory: No Shortness of Breath, No cough, No wheezing  Cardiovascular: No chest pain, No palpitations, No extremity edema  Gastrointestinal: No nausea, No vomiting, No diarrhea, No abdominal pain  Genitourinary: No frequency, No dysuria, No hematuria  Integument/breast: No skin lesion(s)   Neurological: No Confusion, No headaches, No dizziness      Objective:     Visit Vitals  BP (!) 116/55 (BP 1 Location: Left upper arm, BP Patient Position: At rest)   Pulse 60 Temp 98.5 °F (36.9 °C)   Resp 18   Ht 5' 5.98\" (1.676 m)   Wt 81.6 kg (180 lb)   SpO2 95%   BMI 29.07 kg/m²    O2 Flow Rate (L/min): 2 l/min O2 Device: Nasal cannula    Temp (24hrs), Av.8 °F (36.6 °C), Min:97.2 °F (36.2 °C), Max:98.5 °F (36.9 °C)      No intake/output data recorded. 01/15 1901 -  0700  In: -   Out: 700 [Urine:700]    PHYSICAL EXAM:  Constitutional: No acute distress  Skin: Extremities and face reveal no rashes. HEENT: Sclerae anicteric. Extra-occular muscles are intact. Cardiovascular: RRR  Respiratory:  Clear breath sounds bilaterally with no wheezes, rales, or rhonchi. GI: Abdomen nondistended, soft, and nontender. Normal active bowel sounds. Musculoskeletal: No pitting edema of the lower legs. Able to move all ext  Neurological:  Patient is alert and oriented. Cranial nerves II-XII grossly intact  Psychiatric: Mood appears appropriate       Data Review    No results found for this or any previous visit (from the past 24 hour(s)). CBC:   Lab Results   Component Value Date/Time    WBC 8.7 01/15/2023 08:16 AM    RBC 3.92 01/15/2023 08:16 AM    HGB 12.0 01/15/2023 08:16 AM    HCT 38.5 01/15/2023 08:16 AM    PLATELET 720  08:16 AM     BMP:   Lab Results   Component Value Date/Time    Glucose 113 (H) 01/15/2023 08:16 AM    Sodium 140 01/15/2023 08:16 AM    Potassium 4.3 01/15/2023 08:16 AM    Chloride 104 01/15/2023 08:16 AM    CO2 34 (H) 01/15/2023 08:16 AM    BUN 29 (H) 01/15/2023 08:16 AM    Creatinine 0.39 (L) 01/15/2023 08:16 AM    Calcium 8.4 (L) 01/15/2023 08:16 AM         Assessment/Plan   Severe back pain secondary to spinal mass  --Oncology and radiation oncology consulted. --Patient will complete 10 cycles of radiation prior to discharge. --Completed 10/10. Last treatment 2023. -- Clear to discharge pending placement.  Placement is pending  --Patient on fentanyl patch, Neurontin, oxycodone, Flexeril    Gait disturbances with lower extremity weakness  --PT and OT recommend rehab placement. 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 Albuterol inhaler as needed    Dispo: 24-48 hours. Barriers include completion of radiation oncology. Discharge to IRF  if bed available. CODE STATUS full     DVT prophylaxis: Lovenox  Ulcer prophylaxis: Protonix    Care Plan discussed with: Patient/Family, Nurse, and     Total time spent with patient: 35 minutes.

## 2023-01-17 NOTE — PROGRESS NOTES
CM reviewed chart. Patient planned for likely discharge today to [facility]/IRF, pending bed. Reached out to liaison at [facility] to determine bed availability. Discharge plan is [facility]    200: no bed available today at [facility] and rehab in [facility]. Asked if they have any beds at [facility] to offer to patient, still waiting for response.

## 2023-01-17 NOTE — PROGRESS NOTES
Rehab Progress Note    Patient: Shannon Sandy MRN: [id number]  SSN: [id number]    YOB: 1940  Age: 78 y.o. Sex: female      Admit date: 12/30/2022   LOS (days): 18    CC: Difficulty walking     Subjective:   Patient seen today while she is laying in bed noted to be in no acute distress at this time. Patient endorses pain is currently better controlled and she is participating in rehab notes minimal improvement in strength of bilateral lower extremities, completed radiation treatment at this time. HPI (per initial consult note): This is a 78 y.o. female with a past medical history including carcinoma of the lung, COPD. Patient initially presented to [facility] with severe lower extremity weakness not being unable to stand and walk. She was noted to recently undergo MRI of the lumbar which showed a intradural mass posterior to L3. Patient reports she started having worsening pain and weakness. Patient was started on steroids and radiation oncology was consulted. Patient started radiation treatments on 1/3/2023. Patient with pain management with fentanyl patch recently started. Patient family seen today while she is sitting in bed eating lunch appears to be in no acute distress at this time. Patient endorses she is having some improvement in strength as well as pain management. Patient notes that she is eager to continue therapy to get stronger.       Current Facility-Administered Medications   Medication    enoxaparin (LOVENOX) injection 40 mg    docusate sodium (COLACE) capsule 100 mg    oxyCODONE IR (ROXICODONE) tablet 10 mg    fentaNYL (DURAGESIC) 25 mcg/hr patch 2 Patch    ALPRAZolam (XANAX) tablet 0.25 mg    docusate sodium (COLACE) capsule 100 mg    cyclobenzaprine (FLEXERIL) tablet 10 mg    polyethylene glycol (MIRALAX) packet 17 g    hydrOXYzine pamoate (VISTARIL) capsule 25 mg    diphenhydrAMINE (BENADRYL) capsule 25 mg    albuterol-ipratropium (DUO-NEB) 2.5 MG-0.5 MG/3 ML    budesonide-formoteroL (SYMBICORT) 160-4.5 mcg/actuation HFA inhaler 1 Puff    And    tiotropium bromide (SPIRIVA RESPIMAT) 2.5 mcg /actuation    albuterol (PROVENTIL HFA, VENTOLIN HFA, PROAIR HFA) inhaler 2 Puff    folic acid (FOLVITE) tablet 1 mg    gabapentin (NEURONTIN) capsule 400 mg    pantoprazole (PROTONIX) tablet 40 mg    sodium chloride (NS) flush 5-40 mL    sodium chloride (NS) flush 5-40 mL    acetaminophen (TYLENOL) tablet 650 mg    Or    acetaminophen (TYLENOL) suppository 650 mg    ondansetron (ZOFRAN ODT) tablet 4 mg    Or    ondansetron (ZOFRAN) injection 4 mg    dexamethasone (DECADRON) 4 mg/mL injection 8 mg          Objective:     Vitals:    01/16/23 2030 01/17/23 0224 01/17/23 0743 01/17/23 0752   BP: 126/70 135/62  (!) 116/55   Pulse: 80 70  60   Resp: 18 18  18   Temp: 97.8 °F (36.6 °C) 97.2 °F (36.2 °C)  98.5 °F (36.9 °C)   SpO2: 96% 96% 96% 95%   Weight:       Height:            Physical Exam:  General: NAD, pleasant and cooperative   HEENT: moist oral mucosa   CV: RRR  Respiratory: no increased WOB  Abdomen: nondistended  Extremities: no peripheral edema   Musculoskeletal: moving BUE at least to gravity, BLE significant, RLE greater weakness  Neuro: alert, normal speech         Labs:  Recent Labs     01/17/23  0930 01/15/23  0816   WBC  --  8.7   HGB  --  12.0   PLT  --  153    140   K 4.4 4.3   BUN 31* 29*   CREA 0.49* 0.39*         Imaging (last 24 hrs):   No results found. 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 Barriers to rehab: None    Continue PT/OT in the acute setting. Will continue to follow.        Signed By: Daniela Balbuena NP     January 17, 2023    Physical Medicine and Rehabilitation

## 2023-01-17 NOTE — PROGRESS NOTES
Bedside, Verbal, and Written shift change report given to Kristina Morley RN (oncoming nurse) by Lynda Luke RN (offgoing nurse). Report included the following information SBAR.

## 2023-01-17 NOTE — PROGRESS NOTES
OCCUPATIONAL THERAPY TREATMENT  Patient: Gokul Calvert (44 y.o. female)  Date: 1/17/2023  Diagnosis: Acute back pain [M54.9] <principal problem not specified>      Precautions:    Chart, occupational therapy assessment, plan of care, and goals were reviewed. ASSESSMENT  Pt continues with skilled OT/PT services and is progressing slowly towards goals. Pt received semi-supine in bed upon arrival, AXO x4 and agreeable to DE ANDA/ PTA co-tx at this time. Overall, pt continues to present with deficits in generalized strength/AROM, coordination, bed mobility, static/dynamic sitting and standing balance and functional activity tolerance during performance of ADLs/mobility (see below for objective details and assist levels). Pt tolerated session fair, required freq rest break. Pt limited by pain. Pt completed U/L body therex. See PTA noted for l/b details. Pt completed UE therex , see grid below for details, to increase strength/ endurance to aid in adl performance. Pt educated on pursed lip breathing and visualization techniques to help control pain. Pt verbalize good understanding. Pt encouraged to practice the breathing and visualization techniques. See grid below for mobility for current levels. Pt tolerated eob for 4 minutes w/ increased pain noted. Pt declined offer to stand. Pt returned to supine and found to have residual bm on inner thighs. Pt total a w/ perineal clean up. Pt pleasant and cooperative during session. Will continue to progress. Recommend d/c to [facility] once medically appropriate. Other factors to consider for discharge: PLOF, time since onset , severity of deficits and decline from functional baseline. PLAN :  Patient continues to benefit from skilled intervention to address the above impairments. Continue treatment per established plan of care. to address goals.     Recommend with staff: Encourage HEP in prep for ADLs/mobility    Recommend next session: Seated grooming    Recommendation for discharge: (in order for the patient to meet his/her long term goals)  Tae Anand    This discharge recommendation:  Has been made in collaboration with the attending provider and/or case management    IF patient discharges home will need the following DME: TBD       SUBJECTIVE:   Patient stated My feet feels like concrete.     OBJECTIVE DATA SUMMARY:   Cognitive/Behavioral Status:  Neurologic State: Alert  Orientation Level: Oriented X4  Cognition: Appropriate for age attention/concentration; Follows commands    Functional Mobility and Transfers for ADLs:  Bed Mobility:  Rolling: Moderate assistance;Assist x2  Supine to Sit: Moderate assistance;Assist x2  Sit to Supine: Maximum assistance;Assist x2  Scooting: Moderate assistance;Assist x2    Balance:  Sitting: Intact; With support  Sitting - Static: Good (unsupported)  Sitting - Dynamic: Fair (occasional)    ADL Intervention:    Toileting  Bladder Hygiene: Total assistance (dependent)  Bowel Hygiene: Total assistance (dependent)         Therapeutic Exercises:   Exercise Sets Reps AROM AAROM PROM Self PROM Comments   Shoulder flex/ext  Forward and  overhead 1 10 [x] [] [] []    Elbow flex/ext 1 10 [x] [] [] []    Wrist flex/ext 1 10 [x] [] [] []    Hand flex/ ext 1 10 [x] [] [] []        Pain:  0/10 at rest  7-8/ 10 with therex  9/10 at eob    Activity Tolerance:   Fair and requires frequent rest breaks    After treatment patient left in no apparent distress:   Supine in bed, Heels elevated for pressure relief, Call bell within reach, Bed / chair alarm activated, and Side rails x 3, bed locked and in lowest position    COMMUNICATION/COLLABORATION:   The patients plan of care was discussed with: Physical therapy assistant and Registered nurse. PT/OT sessions occurred together for increased safety of pt and clinician.      JALYN Lopez  Time Calculation: 42 mins     Problem: Self Care Deficits Care Plan (Adult)  Goal: *Acute Goals and Plan of Care (Insert Text)  Description:   FUNCTIONAL STATUS PRIOR TO ADMISSION: Patient was modified independent using a rolling walker for functional mobility. Patient required minimal assistance for basic and instrumental ADLs. HOME SUPPORT: The patient lived alone with daughter to provide assistance. Occupational Therapy Goals  Initiated 1/4/2023  Patient Goal: Pt did not state. 1.  Patient will perform lower body dressing with minimal assistance/contact guard assist within 7 day(s). 2.  Patient will perform grooming with modified independence within 7 day(s). 3.  Patient will perform bathing with minimal assistance/contact guard assist within 7 day(s). 4.  Patient will perform toilet transfers with minimal assistance/contact guard assist within 7 day(s). 5.  Patient will perform all aspects of toileting with minimal assistance/contact guard assist within 7 day(s). 6.  Patient will participate in upper extremity therapeutic exercise/activities with independence within 7 day(s). 7.  Patient will utilize energy conservation techniques during functional activities with verbal cues within 7 day(s).   Outcome: Progressing Towards Goal

## 2023-01-17 NOTE — PROGRESS NOTES
Problem: Mobility Impaired (Adult and Pediatric)  Goal: *Acute Goals and Plan of Care (Insert Text)  Description: FUNCTIONAL STATUS PRIOR TO ADMISSION: Patient was modified independent using a rolling walker for functional mobility. Patient required minimal assistance for basic and instrumental ADLs. HOME SUPPORT PRIOR TO ADMISSION: The patient lived alone with daughter to provide assistance. Physical Therapy Goals  Initiated 1/4/2023  Pt stated goal: to go home  Pt will be I with LE HEP in 7 days. Pt will perform bed mobility with mod I utilizing log roll technique in 7 days. Pt will perform transfers with min A in 7 days. Pt will amb 5 feet with LRAD safely with mod I in 7 days. Pt will verbalize and demonstrate compliance with spinal precautions to include no bending, lifting or twisting in 7 days. Outcome: Progressing Towards Goal   PHYSICAL THERAPY TREATMENT  Patient: Marjan Marcial (82 y.o. female)  Date: 1/17/2023  Diagnosis: Acute back pain [M54.9] <principal problem not specified>      Precautions:    Chart, physical therapy assessment, plan of care and goals were reviewed. ASSESSMENT  Patient continues with skilled PT services and is slowly progressing towards goals. Pt received semi supine upon PT/OT arrival, agreeable to session. (See below for objective details and assist levels). Overall pt tolerated session fair with frequent rest breaks today with bed mobility and exercises. Pt. required assist  for LE exercises in bed. Pt. required extensive assist of 2 for bed mobility. Pt. Could only tolerate sitting EOB 4 min due to back pain 9/10. Pt. Asked to return to bed due to pain. Pt. Required frequent rest breaks throughout session. Pt. became anxious at different times during session and required vcs for calming pt. And PLB. O2 sats 96% during these events. Will continue to benefit from skilled PT services, and will continue to progress as tolerated.      Current Level of Function Impacting Discharge (mobility/balance): Mod/max A X 2 for Mobility    Other factors to consider for discharge: safety/tolerance with sitting EOB/activity/PLOF/Pain         PLAN :  Patient continues to benefit from skilled intervention to address the above impairments. Continue treatment per established plan of care to address goals. Recommend with staff: Encourage HEP in prep for ADLs/mobility, Frequent repositioning to prevent skin breakdown, Use of bed/chair alarm for safety, and LE elevation for management of edema    Recommendation for discharge: (in order for the patient to meet his/her long term goals)  Tae Anand    This discharge recommendation:  Has been made in collaboration with the attending provider and/or case management    IF patient discharges home will need the following DME: to be determined (TBD)       SUBJECTIVE:   Patient stated I hurt in my back but I will try. \"    OBJECTIVE DATA SUMMARY:   Critical Behavior:  Neurologic State: Alert  Orientation Level: Oriented X4  Cognition: Appropriate for age attention/concentration, Follows commands     Functional Mobility Training:  Bed Mobility:  Rolling: Moderate assistance;Assist x2  Supine to Sit: Moderate assistance;Assist x2  Sit to Supine: Maximum assistance;Assist x2  Scooting: Moderate assistance;Assist x2     Pt. Needed to be cleaned up and pad changed on bed. Assisted OT. Transfers:                      Balance:  Sitting: Intact; With support  Sitting - Static: Good (unsupported)  Sitting - Dynamic: Fair (occasional)  Ambulation/Gait Training:                           Therapeutic Exercises:       EXERCISE   Sets   Reps   Active Active Assist   Passive Self ROM   Comments   Ankle Pumps  12 [x] [x] [] [] Assist more on R   Quad Sets/Glut Sets   [] [] [] []    Hamstring Sets   [] [] [] []    Short Arc Quads   [] [] [] []    Heel Slides 2 4 [] [x] [] [] Mod assist on R L min assist   Straight Leg Raises   [] [] [] []    Hip abd/add 2 4 [] [x] [] [] Mod assist on R Min on L   Long Arc Quads   [] [] [] []    Marching   [] [] [] []       [] [] [] []       Pain Ratin-8/10 back and R LE  9/10 sitting EOB      Activity Tolerance:   Fair and requires frequent rest breaks    After treatment patient left in no apparent distress:   Bed locked and returned to lowest position, Supine in bed, Heels elevated for pressure relief, Call bell within reach, Bed / chair alarm activated, Side rails x 3, and Notified RN of pt. Asking for pain meds. COMMUNICATION/COLLABORATION:   The patients plan of care was discussed with: Occupational therapy assistant and Registered nurse. [place] with OT for increased safety and mobility for pt and clinician.      Isaias Manriquez, PT   Time Calculation: 42 mins

## 2023-01-18 VITALS
DIASTOLIC BLOOD PRESSURE: 71 MMHG | HEART RATE: 92 BPM | WEIGHT: 180 LBS | OXYGEN SATURATION: 96 % | BODY MASS INDEX: 28.93 KG/M2 | TEMPERATURE: 98 F | HEIGHT: 66 IN | SYSTOLIC BLOOD PRESSURE: 116 MMHG | RESPIRATION RATE: 18 BRPM

## 2023-01-18 LAB
ANION GAP SERPL CALC-SCNC: 3 MMOL/L (ref 5–15)
BUN SERPL-MCNC: 31 MG/DL (ref 6–20)
BUN/CREAT SERPL: 97 (ref 12–20)
CA-I BLD-MCNC: 8.3 MG/DL (ref 8.5–10.1)
CHLORIDE SERPL-SCNC: 103 MMOL/L (ref 97–108)
CO2 SERPL-SCNC: 34 MMOL/L (ref 21–32)
CREAT SERPL-MCNC: 0.32 MG/DL (ref 0.55–1.02)
GLUCOSE SERPL-MCNC: 139 MG/DL (ref 65–100)
POTASSIUM SERPL-SCNC: 4.1 MMOL/L (ref 3.5–5.1)
SODIUM SERPL-SCNC: 140 MMOL/L (ref 136–145)

## 2023-01-18 PROCEDURE — 77010033678 HC OXYGEN DAILY

## 2023-01-18 PROCEDURE — 94761 N-INVAS EAR/PLS OXIMETRY MLT: CPT

## 2023-01-18 PROCEDURE — 74011250637 HC RX REV CODE- 250/637: Performed by: HOSPITALIST

## 2023-01-18 PROCEDURE — 74011250637 HC RX REV CODE- 250/637: Performed by: STUDENT IN AN ORGANIZED HEALTH CARE EDUCATION/TRAINING PROGRAM

## 2023-01-18 PROCEDURE — 80048 BASIC METABOLIC PNL TOTAL CA: CPT

## 2023-01-18 PROCEDURE — 74011250636 HC RX REV CODE- 250/636: Performed by: HOSPITALIST

## 2023-01-18 PROCEDURE — 94640 AIRWAY INHALATION TREATMENT: CPT

## 2023-01-18 PROCEDURE — 36415 COLL VENOUS BLD VENIPUNCTURE: CPT

## 2023-01-18 PROCEDURE — 74011250636 HC RX REV CODE- 250/636: Performed by: PHYSICIAN ASSISTANT

## 2023-01-18 PROCEDURE — 74011250637 HC RX REV CODE- 250/637: Performed by: INTERNAL MEDICINE

## 2023-01-18 PROCEDURE — 74011000250 HC RX REV CODE- 250: Performed by: HOSPITALIST

## 2023-01-18 RX ORDER — FLUTICASONE PROPIONATE AND SALMETEROL XINAFOATE 115; 21 UG/1; UG/1
2 AEROSOL, METERED RESPIRATORY (INHALATION) 2 TIMES DAILY
Qty: 12 G | Refills: 0 | Status: SHIPPED | OUTPATIENT
Start: 2023-01-18

## 2023-01-18 RX ORDER — HYDROXYZINE PAMOATE 25 MG/1
25 CAPSULE ORAL
Qty: 15 CAPSULE | Refills: 0 | Status: SHIPPED | OUTPATIENT
Start: 2023-01-18

## 2023-01-18 RX ORDER — GABAPENTIN 400 MG/1
400 CAPSULE ORAL 3 TIMES DAILY
Qty: 90 CAPSULE | Refills: 0 | Status: SHIPPED | OUTPATIENT
Start: 2023-01-18 | End: 2023-01-18

## 2023-01-18 RX ORDER — CYCLOBENZAPRINE HCL 10 MG
10 TABLET ORAL
Qty: 15 TABLET | Refills: 0 | Status: SHIPPED | OUTPATIENT
Start: 2023-01-18

## 2023-01-18 RX ORDER — FENTANYL 25 UG/1
2 PATCH TRANSDERMAL
Qty: 4 PATCH | Refills: 0 | Status: SHIPPED | OUTPATIENT
Start: 2023-01-19 | End: 2023-01-25

## 2023-01-18 RX ORDER — GABAPENTIN 400 MG/1
400 CAPSULE ORAL 3 TIMES DAILY
Qty: 90 CAPSULE | Refills: 0 | Status: SHIPPED | OUTPATIENT
Start: 2023-01-18 | End: 2023-02-17

## 2023-01-18 RX ORDER — METOPROLOL SUCCINATE 25 MG/1
25 TABLET, EXTENDED RELEASE ORAL DAILY
Qty: 30 TABLET | Refills: 0 | Status: SHIPPED | OUTPATIENT
Start: 2023-01-18 | End: 2023-02-17

## 2023-01-18 RX ORDER — DOCUSATE SODIUM 100 MG/1
100 CAPSULE, LIQUID FILLED ORAL DAILY
Qty: 30 CAPSULE | Refills: 2 | Status: SHIPPED | OUTPATIENT
Start: 2023-01-19 | End: 2023-04-19

## 2023-01-18 RX ORDER — DEXAMETHASONE 4 MG/1
4 TABLET ORAL 3 TIMES DAILY
Qty: 90 TABLET | Refills: 0 | Status: SHIPPED | OUTPATIENT
Start: 2023-01-18 | End: 2023-02-17

## 2023-01-18 RX ADMIN — SODIUM CHLORIDE, PRESERVATIVE FREE 10 ML: 5 INJECTION INTRAVENOUS at 05:24

## 2023-01-18 RX ADMIN — OXYCODONE HYDROCHLORIDE 10 MG: 5 TABLET ORAL at 09:51

## 2023-01-18 RX ADMIN — DOCUSATE SODIUM 100 MG: 100 CAPSULE, LIQUID FILLED ORAL at 09:51

## 2023-01-18 RX ADMIN — SODIUM CHLORIDE, PRESERVATIVE FREE 10 ML: 5 INJECTION INTRAVENOUS at 14:00

## 2023-01-18 RX ADMIN — OXYCODONE HYDROCHLORIDE 10 MG: 5 TABLET ORAL at 14:33

## 2023-01-18 RX ADMIN — GABAPENTIN 400 MG: 400 CAPSULE ORAL at 09:52

## 2023-01-18 RX ADMIN — ENOXAPARIN SODIUM 40 MG: 100 INJECTION SUBCUTANEOUS at 03:10

## 2023-01-18 RX ADMIN — PANTOPRAZOLE SODIUM 40 MG: 40 TABLET, DELAYED RELEASE ORAL at 09:51

## 2023-01-18 RX ADMIN — FOLIC ACID 1 MG: 1 TABLET ORAL at 09:51

## 2023-01-18 RX ADMIN — DEXAMETHASONE SODIUM PHOSPHATE 8 MG: 4 INJECTION, SOLUTION INTRA-ARTICULAR; INTRALESIONAL; INTRAMUSCULAR; INTRAVENOUS; SOFT TISSUE at 14:33

## 2023-01-18 RX ADMIN — BUDESONIDE AND FORMOTEROL FUMARATE DIHYDRATE 1 PUFF: 160; 4.5 AEROSOL RESPIRATORY (INHALATION) at 08:42

## 2023-01-18 RX ADMIN — TIOTROPIUM BROMIDE INHALATION SPRAY 2 PUFF: 3.12 SPRAY, METERED RESPIRATORY (INHALATION) at 08:42

## 2023-01-18 RX ADMIN — DEXAMETHASONE SODIUM PHOSPHATE 8 MG: 4 INJECTION, SOLUTION INTRA-ARTICULAR; INTRALESIONAL; INTRAMUSCULAR; INTRAVENOUS; SOFT TISSUE at 05:24

## 2023-01-18 NOTE — DISCHARGE SUMMARY
Hospitalist Discharge Summary     Patient ID:    Jae Perez  [id number]  49 y.o.  1940    Admit date: 12/30/2022    Discharge date : 1/18/2023      Final Diagnoses: Active Problems:    Acute back pain (12/30/2022)      Lower extremity weakness (12/30/2022)      Gait disturbance (12/30/2022)      Reason for Hospitalization/Hospital Course:   Gely Otero 78 y.o. female history of carcinoma of the lung with lobectomy in 1996, since then on follow-up with surveillance. She woke up the morning of 12/30/2023 with severe lower extremity weakness and pain unable to stand up and walk. 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 She is recommended to follow-up with oncology, Dr. Jean Carlos Pineda, had MRI and PET scan scheduled January 2023. States the back pain started getting worse, its severe, given oxycodone 10 mg 3 times a day but not helping the pain. She was admitted for pain management and management of intradural mass posterior to L3. Due to the mass in the lumbar spine, A CT was  requested of the thoracic spine and lumbar spine and a  consult placed to radiation oncology. As she is not completely paralyzed, radiation oncology recommended to continue steroids at this time and follow-up. Dr. Britni Elliott, rad/onc, started radiation treatment on 01/03. She was to complete 10 doses of radiation prior to discharge. Oncology was consulted and recommended orthopedic consult. Ortho could not offer any interventions at this time. Patient completed 10 doses of radiation on 01/16/2023. Discharge to IRF was delayed secondary to bed availability. Patient to discharge to [facility] in stable condition with instructions to continue current medication regimen and physical/occupational therapy.   To follow-up outpatient with oncologist.      Discharge Medications:   Current Discharge Medication List        START taking these medications    Details   tiotropium bromide (SPIRIVA RESPIMAT) 2.5 mcg/actuation inhaler Take 2 Puffs by inhalation daily. Qty: 4 g, Refills: 0  Start date: 1/19/2023      fluticasone propion-salmeteroL (Advair HFA) 115-21 mcg/actuation inhaler Take 2 Puffs by inhalation two (2) times a day. Qty: 12 g, Refills: 0  Start date: 1/18/2023      docusate sodium (COLACE) 100 mg capsule Take 1 Capsule by mouth daily for 90 days. Qty: 30 Capsule, Refills: 2  Start date: 1/19/2023, End date: 4/19/2023      hydrOXYzine pamoate (VISTARIL) 25 mg capsule Take 1 Capsule by mouth three (3) times daily as needed for Anxiety for up to 15 doses. Qty: 15 Capsule, Refills: 0  Start date: 1/18/2023      dexAMETHasone (DECADRON) 4 mg tablet Take 4 mg by mouth three (3) times daily for 30 days. Qty: 90 Tablet, Refills: 0  Start date: 1/18/2023, End date: 2/17/2023      cyclobenzaprine (FLEXERIL) 10 mg tablet Take 1 Tablet by mouth three (3) times daily as needed for Muscle Spasm(s) for up to 15 doses. Qty: 15 Tablet, Refills: 0  Start date: 1/18/2023      fentaNYL (DURAGESIC) 25 mcg/hr PATCH 2 Patches by TransDERmal route every seventy-two (72) hours for 6 days. Max Daily Amount: 2 Patches. Qty: 4 Patch, Refills: 0  Start date: 1/19/2023, End date: 1/25/2023    Associated Diagnoses: Acute back pain, unspecified back location, unspecified back pain laterality           CONTINUE these medications which have CHANGED    Details   metoprolol succinate (TOPROL-XL) 25 mg XL tablet Take 1 Tablet by mouth daily for 30 days. Qty: 30 Tablet, Refills: 0  Start date: 1/18/2023, End date: 2/17/2023      gabapentin (NEURONTIN) 400 mg capsule Take 1 Capsule by mouth three (3) times daily for 30 days. Max Daily Amount: 1,200 mg.   Qty: 90 Capsule, Refills: 0  Start date: 1/18/2023, End date: 2/17/2023    Associated Diagnoses: Gait disturbance           CONTINUE these medications which have NOT CHANGED    Details   oxyCODONE IR (ROXICODONE) 10 mg tab immediate release tablet Take 10 mg by mouth three (3) times daily. omeprazole (PRILOSEC) 20 mg capsule Take 40 mg by mouth Daily (before breakfast). Trelegy Ellipta 200-62.5-25 mcg inhaler Take 1 Puff by inhalation daily. albuterol-ipratropium (DUO-NEB) 2.5 mg-0.5 mg/3 ml nebu 3 mL by Nebulization route every four (4) hours as needed. trimethoprim (TRIMPEX) 100 mg tablet Take 100 mg by mouth daily as needed. Indications: urinary tract infection prevention      rosuvastatin (CRESTOR) 10 mg tablet Take 10 mg by mouth nightly. folic acid (FOLVITE) 1 mg tablet Take  by mouth Daily (before breakfast). albuterol (PROVENTIL HFA, VENTOLIN HFA, PROAIR HFA) 90 mcg/actuation inhaler Take 2 Puffs by inhalation every six (6) hours as needed for Wheezing or Shortness of Breath. STOP taking these medications       iron polysaccharides (NIFEREX) 150 mg iron capsule Comments:   Reason for Stopping: Follow up Care:    1. Romy Brennan MD in 1-2 weeks. Follow-up Information       Follow up With Specialties Details Why Contact Info    Romy Brennan MD Internal Medicine Physician   [facility] [address]  [place] [address] [address]      Imelda Agosto MD Radiation Oncology Follow up in 2 week(s) As needed, If symptoms worsen [address] [address] Mario Multani MD Internal Medicine Physician Follow up in 2 day(s) As needed, If symptoms worsen [address] [address] [phone]      Lizet Boss MD Orthopedic Surgery Follow up As needed, If symptoms worsen [address] [place]  [phone]                Patient Follow Up Instructions:    Activity: Activity as tolerated  Diet:  Cardiac Diet  Wound Care: None needed     Condition at Discharge: Stable  __________________________________________________________________    Disposition  Rehab Facility  ____________________________________________________________________    Code Status:  Full Code  ___________________________________________________________________    Discharge Exam:  Patient seen and examined by me on discharge day. Pertinent Findings:  Gen:    Not in distress  Chest: Clear lungs  CVS:   Regular rhythm. No edema  Abd:  Soft, not distended, not tender  Neuro:  Alert        CONSULTATIONS: Hematology/Oncology, Orthopedic Surgery, and Radiation Oncology    Significant Diagnostic Studies:   Recent Results (from the past 24 hour(s))   METABOLIC PANEL, BASIC    Collection Time: 01/18/23  4:02 AM   Result Value Ref Range    Sodium 140 136 - 145 mmol/L    Potassium 4.1 3.5 - 5.1 mmol/L    Chloride 103 97 - 108 mmol/L    CO2 34 (H) 21 - 32 mmol/L    Anion gap 3 (L) 5 - 15 mmol/L    Glucose 139 (H) 65 - 100 mg/dL    BUN 31 (H) 6 - 20 mg/dL    Creatinine 0.32 (L) 0.55 - 1.02 mg/dL    BUN/Creatinine ratio 97 (H) 12 - 20      eGFR >60 >60 ml/min/1.73m2    Calcium 8.3 (L) 8.5 - 10.1 mg/dL     CT CHEST ABD PELV WO CONT   Final Result      No evidence for recurrent or metastatic disease in the chest, abdomen, or   pelvis. Stable appearance of the bones. Please refer to above findings for   complete details. CT SPINE LUMB WO CONT   Final Result   1. No evidence of acute fracture. 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   2. 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 CT SPINE [facility] WO CONT   Final Result   1. No evidence of acute fracture.              Time spent in direct and indirect care including coordination of services: Greater than 35 minutes    Signed:  Chloe Madrid  1/18/2023  1:33 PM

## 2023-01-18 NOTE — PROGRESS NOTES
Hospitalist Progress Note    Subjective:   Daily Progress Note: 1/18/2023 9:21 AM    Hospital Course: eGly Otero 78 y.o. female history of carcinoma of the lung with lobectomy in 1996, since then on follow-up with surveillance. She woke up the morning of 12/30/2023 with severe lower extremity weakness and pain unable to stand up and walk. 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 She is recommended to follow-up with oncology, Dr. Sanjeev Guajardo, had MRI and PET scan scheduled January 2023. States the back pain started getting worse, its severe, given oxycodone 10 mg 3 times a day but not helping the pain. Its radiating into the legs, now with severe weakness. She is crying with pain worse with any movement. Due to the mass in the lumbar spine requested for CT of thoracic spine and lumbar spine, consult placed to radiation oncology. As she is not completely paralyzed, radiation oncology recommended to continue steroids at this time and follow-up. Dr. Skylar Franco, rad/onc, to start radiation treatment on 01/03. Oncology consult. Continue pain management inpatient for now. Family interested in pursuing inpatient rehab post radiation therapy. Will complete 10 treatments of radiation prior to discharge. Discharge delayed secondary to placement issues at [facility]. Subjective: No new acute complaints. Notes improvement still has some pain.     Current Facility-Administered Medications   Medication Dose Route Frequency    enoxaparin (LOVENOX) injection 40 mg  40 mg SubCUTAneous Q24H    docusate sodium (COLACE) capsule 100 mg  100 mg Oral BID PRN    oxyCODONE IR (ROXICODONE) tablet 10 mg  10 mg Oral Q4H PRN    fentaNYL (DURAGESIC) 25 mcg/hr patch 2 Patch  2 Patch TransDERmal Q72H    ALPRAZolam (XANAX) tablet 0.25 mg  0.25 mg Oral TID PRN    docusate sodium (COLACE) capsule 100 mg  100 mg Oral DAILY    cyclobenzaprine (FLEXERIL) tablet 10 mg  10 mg Oral TID PRN    polyethylene glycol (MIRALAX) packet 17 g  17 g Oral QHS    hydrOXYzine pamoate (VISTARIL) capsule 25 mg  25 mg Oral TID PRN    diphenhydrAMINE (BENADRYL) capsule 25 mg  25 mg Oral Q6H PRN    albuterol-ipratropium (DUO-NEB) 2.5 MG-0.5 MG/3 ML  3 mL Nebulization Q4H PRN    budesonide-formoteroL (SYMBICORT) 160-4.5 mcg/actuation HFA inhaler 1 Puff  1 Puff Inhalation BID RT    And    tiotropium bromide (SPIRIVA RESPIMAT) 2.5 mcg /actuation  2 Puff Inhalation DAILY    albuterol (PROVENTIL HFA, VENTOLIN HFA, PROAIR HFA) inhaler 2 Puff  2 Puff Inhalation L3X PRN    folic acid (FOLVITE) tablet 1 mg  1 mg Oral ACB    gabapentin (NEURONTIN) capsule 400 mg  400 mg Oral TID    pantoprazole (PROTONIX) tablet 40 mg  40 mg Oral ACB    sodium chloride (NS) flush 5-40 mL  5-40 mL IntraVENous Q8H    sodium chloride (NS) flush 5-40 mL  5-40 mL IntraVENous PRN    acetaminophen (TYLENOL) tablet 650 mg  650 mg Oral Q6H PRN    Or    acetaminophen (TYLENOL) suppository 650 mg  650 mg Rectal Q6H PRN    ondansetron (ZOFRAN ODT) tablet 4 mg  4 mg Oral Q6H PRN    Or    ondansetron (ZOFRAN) injection 4 mg  4 mg IntraVENous Q6H PRN    dexamethasone (DECADRON) 4 mg/mL injection 8 mg  8 mg IntraVENous Q8H        Review of Systems  Constitutional: No fevers, No chills, No sweats, ++ fatigue, ++ Weakness  Eyes: No redness  Ears, nose, mouth, throat, and face: No nasal congestion, No sore throat, No voice change  Respiratory: No Shortness of Breath, No cough, No wheezing  Cardiovascular: No chest pain, No palpitations, No extremity edema  Gastrointestinal: No nausea, No vomiting, No diarrhea, No abdominal pain  Genitourinary: No frequency, No dysuria, No hematuria  Integument/breast: No skin lesion(s)   Neurological: No Confusion, No headaches, No dizziness      Objective:     Visit Vitals  /62 (BP 1 Location: Left upper arm, BP Patient Position: At rest)   Pulse 65   Temp 97.9 °F (36.6 °C)   Resp 18   Ht 5' 5.98\" (1.676 m)   Wt 81.6 kg (180 lb)   SpO2 97%   BMI 29.07 kg/m²    O2 Flow Rate (L/min): 2 l/min O2 Device: Nasal cannula    Temp (24hrs), Av.1 °F (36.7 °C), Min:97.9 °F (36.6 °C), Max:98.9 °F (37.2 °C)      No intake/output data recorded.  1901 -  0700  In: -   Out: 900 [Urine:900]    PHYSICAL EXAM:  Constitutional: No acute distress  Skin: Extremities and face reveal no rashes. HEENT: Sclerae anicteric. Extra-occular muscles are intact. Cardiovascular: RRR  Respiratory:  Clear breath sounds bilaterally with no wheezes, rales, or rhonchi. GI: Abdomen nondistended, soft, and nontender. Normal active bowel sounds. Musculoskeletal: No pitting edema of the lower legs. Able to move all ext  Neurological:  Patient is alert and oriented.  Cranial nerves II-XII grossly intact  Psychiatric: Mood appears appropriate       Data Review    Recent Results (from the past 24 hour(s))   METABOLIC PANEL, BASIC    Collection Time: 23  9:30 AM   Result Value Ref Range    Sodium 141 136 - 145 mmol/L    Potassium 4.4 3.5 - 5.1 mmol/L    Chloride 105 97 - 108 mmol/L    CO2 35 (H) 21 - 32 mmol/L    Anion gap 1 (L) 5 - 15 mmol/L    Glucose 134 (H) 65 - 100 mg/dL    BUN 31 (H) 6 - 20 mg/dL    Creatinine 0.49 (L) 0.55 - 1.02 mg/dL    BUN/Creatinine ratio 63 (H) 12 - 20      eGFR >60 >60 ml/min/1.73m2    Calcium 8.4 (L) 8.5 - 31.4 mg/dL   METABOLIC PANEL, BASIC    Collection Time: 23  4:02 AM   Result Value Ref Range    Sodium 140 136 - 145 mmol/L    Potassium 4.1 3.5 - 5.1 mmol/L    Chloride 103 97 - 108 mmol/L    CO2 34 (H) 21 - 32 mmol/L    Anion gap 3 (L) 5 - 15 mmol/L    Glucose 139 (H) 65 - 100 mg/dL    BUN 31 (H) 6 - 20 mg/dL    Creatinine 0.32 (L) 0.55 - 1.02 mg/dL    BUN/Creatinine ratio 97 (H) 12 - 20      eGFR >60 >60 ml/min/1.73m2    Calcium 8.3 (L) 8.5 - 10.1 mg/dL         CBC:   Lab Results   Component Value Date/Time    WBC 8.7 01/15/2023 08:16 AM    RBC 3.92 01/15/2023 08:16 AM    HGB 12.0 01/15/2023 08:16 AM    HCT 38.5 01/15/2023 08:16 AM    PLATELET 693 54/25/8089 08:16 AM     BMP:   Lab Results   Component Value Date/Time    Glucose 139 (H) 01/18/2023 04:02 AM    Sodium 140 01/18/2023 04:02 AM    Potassium 4.1 01/18/2023 04:02 AM    Chloride 103 01/18/2023 04:02 AM    CO2 34 (H) 01/18/2023 04:02 AM    BUN 31 (H) 01/18/2023 04:02 AM    Creatinine 0.32 (L) 01/18/2023 04:02 AM    Calcium 8.3 (L) 01/18/2023 04:02 AM         Assessment/Plan   Severe back pain secondary to spinal mass  --Oncology and radiation oncology consulted. --Patient will complete 10 cycles of radiation prior to discharge. --Completed 10/10. Last treatment 01/16/2023. -- Clear to discharge pending placement. Should go today pickup time is 3 PM  --Patient on fentanyl patch, Neurontin, oxycodone, Flexeril    Gait disturbances with lower extremity weakness  --PT and OT recommend rehab placement. 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 Albuterol inhaler as needed    Dispo: Today. Barriers include completion of radiation oncology. Discharge to [facility]  if bed available. CODE STATUS full     DVT prophylaxis: Lovenox  Ulcer prophylaxis: Protonix    Care Plan discussed with: Patient/Family, Nurse, and     Total time spent with patient: 35 minutes.

## 2023-01-18 NOTE — PROGRESS NOTES
CM reviewed chart. Patient pending bed availability at [facility], needed to finish radiation, which finished Monday. Reached out to [facility] to see if they have bed for this patient today. Waiting for response.

## 2023-01-18 NOTE — PROGRESS NOTES
Bedside, Verbal, and Written shift change report given to [facility] LPN (oncoming nurse) by Nhung Arceo RN (offgoing nurse). Report included the following information SBAR.

## 2023-01-18 NOTE — PROGRESS NOTES
Patient discharging today to [facility] today, bed available at 3pm/1500 per liaison. Spoke to patient and daughter to inform them. Transport being setup due to L3 spinal mass/severe weakness. [facility] pickup time 3pm. Discharge plan of care/case management plan validated with provider discharge order.         Nurse to call report at [phone]

## 2023-01-21 ENCOUNTER — HOSPITAL ENCOUNTER (OUTPATIENT)
Dept: LAB | Age: 83
Discharge: HOME | End: 2023-01-21

## 2023-01-21 LAB
BASOPHILS # BLD: 0 K/UL (ref 0–0.1)
BASOPHILS NFR BLD: 0 % (ref 0–1)
DIFFERENTIAL METHOD BLD: ABNORMAL
EOSINOPHIL # BLD: 0 K/UL (ref 0–0.4)
EOSINOPHIL NFR BLD: 0 % (ref 0–7)
ERYTHROCYTE [DISTWIDTH] IN BLOOD BY AUTOMATED COUNT: 13.9 % (ref 11.5–14.5)
HCT VFR BLD AUTO: 41.5 % (ref 35–47)
HGB BLD-MCNC: 12.7 G/DL (ref 11.5–16)
IMM GRANULOCYTES # BLD AUTO: 0 K/UL (ref 0–0.04)
IMM GRANULOCYTES NFR BLD AUTO: 0 % (ref 0–0.5)
LYMPHOCYTES # BLD: 0.3 K/UL (ref 0.8–3.5)
LYMPHOCYTES NFR BLD: 4 % (ref 12–49)
MCH RBC QN AUTO: 30 PG (ref 26–34)
MCHC RBC AUTO-ENTMCNC: 30.6 G/DL (ref 30–36.5)
MCV RBC AUTO: 98.1 FL (ref 80–99)
MONOCYTES # BLD: 0.3 K/UL (ref 0–1)
MONOCYTES NFR BLD: 5 % (ref 5–13)
NEUTS SEG # BLD: 6.8 K/UL (ref 1.8–8)
NEUTS SEG NFR BLD: 91 % (ref 32–75)
NRBC # BLD: 0 K/UL (ref 0–0.01)
NRBC BLD-RTO: 0 PER 100 WBC
PLATELET # BLD AUTO: 110 K/UL (ref 150–400)
PMV BLD AUTO: 9.9 FL (ref 8.9–12.9)
RBC # BLD AUTO: 4.23 M/UL (ref 3.8–5.2)
WBC # BLD AUTO: 7.5 K/UL (ref 3.6–11)

## 2023-01-21 PROCEDURE — 85025 COMPLETE CBC W/AUTO DIFF WBC: CPT

## 2023-01-24 ENCOUNTER — HOSPITAL ENCOUNTER (OUTPATIENT)
Dept: LAB | Age: 83
Discharge: HOME | End: 2023-01-24

## 2023-01-24 LAB
BASOPHILS # BLD: 0 K/UL (ref 0–0.1)
BASOPHILS NFR BLD: 0 % (ref 0–1)
DIFFERENTIAL METHOD BLD: ABNORMAL
EOSINOPHIL # BLD: 0 K/UL (ref 0–0.4)
EOSINOPHIL NFR BLD: 0 % (ref 0–7)
ERYTHROCYTE [DISTWIDTH] IN BLOOD BY AUTOMATED COUNT: 13.8 % (ref 11.5–14.5)
HCT VFR BLD AUTO: 39.6 % (ref 35–47)
HGB BLD-MCNC: 12.1 G/DL (ref 11.5–16)
IMM GRANULOCYTES # BLD AUTO: 0.1 K/UL (ref 0–0.04)
IMM GRANULOCYTES NFR BLD AUTO: 1 % (ref 0–0.5)
LYMPHOCYTES # BLD: 0.3 K/UL (ref 0.8–3.5)
LYMPHOCYTES NFR BLD: 5 % (ref 12–49)
MCH RBC QN AUTO: 30.7 PG (ref 26–34)
MCHC RBC AUTO-ENTMCNC: 30.6 G/DL (ref 30–36.5)
MCV RBC AUTO: 100.5 FL (ref 80–99)
MONOCYTES # BLD: 0.3 K/UL (ref 0–1)
MONOCYTES NFR BLD: 7 % (ref 5–13)
NEUTS SEG # BLD: 4.2 K/UL (ref 1.8–8)
NEUTS SEG NFR BLD: 87 % (ref 32–75)
NRBC # BLD: 0 K/UL (ref 0–0.01)
NRBC BLD-RTO: 0 PER 100 WBC
PLATELET # BLD AUTO: 114 K/UL (ref 150–400)
PMV BLD AUTO: 9.7 FL (ref 8.9–12.9)
RBC # BLD AUTO: 3.94 M/UL (ref 3.8–5.2)
WBC # BLD AUTO: 4.9 K/UL (ref 3.6–11)

## 2023-01-24 PROCEDURE — 85025 COMPLETE CBC W/AUTO DIFF WBC: CPT

## 2023-01-24 PROCEDURE — 36415 COLL VENOUS BLD VENIPUNCTURE: CPT

## 2023-04-22 DIAGNOSIS — C34.32 PRIMARY MALIGNANT NEOPLASM OF BRONCHUS OF LEFT LOWER LOBE (HCC): Primary | ICD-10-CM

## (undated) DEVICE — DRAPE,REIN 53X77,STERILE: Brand: MEDLINE

## (undated) DEVICE — SYR 10ML LUER LOK 1/5ML GRAD --

## (undated) DEVICE — PREP SKN CHLRAPRP 26ML TNT -- CONVERT TO ITEM 373320

## (undated) DEVICE — TURNOVER KIT OR CUST CMB FLD GEN LF

## (undated) DEVICE — 3-0 COATED VICRYL PLUS UNDYED 1X27" SH --

## (undated) DEVICE — SUTURE VCRL + SZ 3-0 L36IN ABSRB UD L36MM CT-1 1/2 CIR VCP944H

## (undated) DEVICE — INTENDED FOR TISSUE SEPARATION, AND OTHER PROCEDURES THAT REQUIRE A SHARP SURGICAL BLADE TO PUNCTURE OR CUT.: Brand: BARD-PARKER SAFETY BLADES SIZE 15, STERILE

## (undated) DEVICE — HEAD FRAME FOAM POSITIONER: Brand: CARDINAL HEALTH

## (undated) DEVICE — GARMENT CMPR STD UNV CALF FLWT -- RN VENTILATE NS DISP 17- IN

## (undated) DEVICE — MINOR GENERAL PACK: Brand: MEDLINE INDUSTRIES, INC.

## (undated) DEVICE — LAMINECTOMY ARM CRADLE FOAM POSITIONER: Brand: CARDINAL HEALTH

## (undated) DEVICE — TOWEL,OR,DSP,ST,BLUE,STD,2/PK,40PK/CS: Brand: MEDLINE

## (undated) DEVICE — ULNAR NERVE PROTECTOR FOAM POSITIONER: Brand: CARDINAL HEALTH

## (undated) DEVICE — STERILE POLYISOPRENE POWDER-FREE SURGICAL GLOVES: Brand: PROTEXIS

## (undated) DEVICE — BASIC SINGLE BASIN-LF: Brand: MEDLINE INDUSTRIES, INC.

## (undated) DEVICE — Device: Brand: JELCO

## (undated) DEVICE — SOL IRR NACL 0.9% 500ML POUR --

## (undated) DEVICE — STERILE POLYISOPRENE POWDER-FREE SURGICAL GLOVES WITH EMOLLIENT COATING: Brand: PROTEXIS

## (undated) DEVICE — SUT MONOCRYL PLUS UD 4-0 --